# Patient Record
Sex: MALE | Race: WHITE | NOT HISPANIC OR LATINO | Employment: OTHER | URBAN - METROPOLITAN AREA
[De-identification: names, ages, dates, MRNs, and addresses within clinical notes are randomized per-mention and may not be internally consistent; named-entity substitution may affect disease eponyms.]

---

## 2017-02-17 DIAGNOSIS — F43.20 ADULT SITUATIONAL STRESS DISORDER: Chronic | ICD-10-CM

## 2017-02-17 DIAGNOSIS — F41.1 GAD (GENERALIZED ANXIETY DISORDER): ICD-10-CM

## 2017-02-17 RX ORDER — ESCITALOPRAM OXALATE 10 MG/1
TABLET ORAL
Qty: 30 TABLET | Refills: 1 | Status: SHIPPED | OUTPATIENT
Start: 2017-02-17 | End: 2017-04-17 | Stop reason: SDUPTHER

## 2017-03-13 ENCOUNTER — LAB VISIT (OUTPATIENT)
Dept: LAB | Facility: HOSPITAL | Age: 62
End: 2017-03-13
Attending: STUDENT IN AN ORGANIZED HEALTH CARE EDUCATION/TRAINING PROGRAM
Payer: COMMERCIAL

## 2017-03-13 LAB
ANION GAP SERPL CALC-SCNC: 10 MMOL/L
BUN SERPL-MCNC: 18 MG/DL
CALCIUM SERPL-MCNC: 9.7 MG/DL
CHLORIDE SERPL-SCNC: 105 MMOL/L
CO2 SERPL-SCNC: 24 MMOL/L
CREAT SERPL-MCNC: 1.4 MG/DL
EST. GFR  (AFRICAN AMERICAN): >60 ML/MIN/1.73 M^2
EST. GFR  (NON AFRICAN AMERICAN): 53.1 ML/MIN/1.73 M^2
GLUCOSE SERPL-MCNC: 117 MG/DL
POTASSIUM SERPL-SCNC: 5.2 MMOL/L
SODIUM SERPL-SCNC: 139 MMOL/L

## 2017-03-13 PROCEDURE — 36415 COLL VENOUS BLD VENIPUNCTURE: CPT | Mod: PO

## 2017-03-13 PROCEDURE — 83036 HEMOGLOBIN GLYCOSYLATED A1C: CPT

## 2017-03-13 PROCEDURE — 80048 BASIC METABOLIC PNL TOTAL CA: CPT

## 2017-03-14 LAB
ESTIMATED AVG GLUCOSE: 180 MG/DL
HBA1C MFR BLD HPLC: 7.9 %

## 2017-03-20 ENCOUNTER — OFFICE VISIT (OUTPATIENT)
Dept: ENDOCRINOLOGY | Facility: CLINIC | Age: 62
End: 2017-03-20
Payer: COMMERCIAL

## 2017-03-20 VITALS
BODY MASS INDEX: 24.61 KG/M2 | WEIGHT: 171.88 LBS | SYSTOLIC BLOOD PRESSURE: 110 MMHG | HEART RATE: 76 BPM | DIASTOLIC BLOOD PRESSURE: 58 MMHG | HEIGHT: 70 IN

## 2017-03-20 DIAGNOSIS — I15.2 HYPERTENSION ASSOCIATED WITH DIABETES: ICD-10-CM

## 2017-03-20 DIAGNOSIS — N18.30 TYPE 1 DIABETES MELLITUS WITH STAGE 3 CHRONIC KIDNEY DISEASE: ICD-10-CM

## 2017-03-20 DIAGNOSIS — I25.10 CORONARY ARTERY DISEASE DUE TO CALCIFIED CORONARY LESION: ICD-10-CM

## 2017-03-20 DIAGNOSIS — E11.59 HYPERTENSION ASSOCIATED WITH DIABETES: ICD-10-CM

## 2017-03-20 DIAGNOSIS — Z46.81 INSULIN PUMP FITTING OR ADJUSTMENT: ICD-10-CM

## 2017-03-20 DIAGNOSIS — E10.22 TYPE 1 DIABETES MELLITUS WITH STAGE 3 CHRONIC KIDNEY DISEASE: ICD-10-CM

## 2017-03-20 DIAGNOSIS — E10.319 TYPE 1 DIABETES MELLITUS WITH RETINOPATHY, MACULAR EDEMA PRESENCE UNSPECIFIED, UNSPECIFIED LATERALITY, UNSPECIFIED RETINOPATHY SEVERITY: ICD-10-CM

## 2017-03-20 DIAGNOSIS — I25.84 CORONARY ARTERY DISEASE DUE TO CALCIFIED CORONARY LESION: ICD-10-CM

## 2017-03-20 DIAGNOSIS — E16.2 HYPOGLYCEMIA: ICD-10-CM

## 2017-03-20 DIAGNOSIS — E10.69 HYPERLIPIDEMIA DUE TO TYPE 1 DIABETES MELLITUS: ICD-10-CM

## 2017-03-20 DIAGNOSIS — E10.42 TYPE 1 DIABETES MELLITUS WITH DIABETIC POLYNEUROPATHY: Primary | ICD-10-CM

## 2017-03-20 DIAGNOSIS — E78.5 HYPERLIPIDEMIA DUE TO TYPE 1 DIABETES MELLITUS: ICD-10-CM

## 2017-03-20 PROCEDURE — 3074F SYST BP LT 130 MM HG: CPT | Mod: S$GLB,,, | Performed by: NURSE PRACTITIONER

## 2017-03-20 PROCEDURE — 3078F DIAST BP <80 MM HG: CPT | Mod: S$GLB,,, | Performed by: NURSE PRACTITIONER

## 2017-03-20 PROCEDURE — 2022F DILAT RTA XM EVC RTNOPTHY: CPT | Mod: S$GLB,,, | Performed by: NURSE PRACTITIONER

## 2017-03-20 PROCEDURE — 4010F ACE/ARB THERAPY RXD/TAKEN: CPT | Mod: S$GLB,,, | Performed by: NURSE PRACTITIONER

## 2017-03-20 PROCEDURE — 1160F RVW MEDS BY RX/DR IN RCRD: CPT | Mod: S$GLB,,, | Performed by: NURSE PRACTITIONER

## 2017-03-20 PROCEDURE — 99215 OFFICE O/P EST HI 40 MIN: CPT | Mod: S$GLB,,, | Performed by: NURSE PRACTITIONER

## 2017-03-20 PROCEDURE — 3045F PR MOST RECENT HEMOGLOBIN A1C LEVEL 7.0-9.0%: CPT | Mod: S$GLB,,, | Performed by: NURSE PRACTITIONER

## 2017-03-20 PROCEDURE — 99999 PR PBB SHADOW E&M-EST. PATIENT-LVL IV: CPT | Mod: PBBFAC,,, | Performed by: NURSE PRACTITIONER

## 2017-03-20 NOTE — MR AVS SNAPSHOT
[facility]  [address] [facility] [address] [place] [address]  Phone: [phone]  Fax: [phone]                  Narciso Clayt   3/20/2017 3:30 PM   Office Visit    Description:  Male : 1955   Provider:  KLAUDIA Bird,VALENTINE   Department:  [facility] Endocrinology           Reason for Visit     Diabetes Mellitus           Diagnoses this Visit        Comments    Type 1 diabetes mellitus with diabetic polyneuropathy    -  Primary     Type 1 diabetes mellitus with retinopathy, macular edema presence unspecified, unspecified laterality, unspecified retinopathy severity         Type 1 diabetes mellitus with stage 3 chronic kidney disease         Coronary artery disease due to calcified coronary lesion         Hypertension associated with diabetes         Hyperlipidemia due to type 1 diabetes mellitus         Insulin pump status                To Do List           Future Appointments        Provider Department Dept Phone    2017 2:30 PM Jefe Coronado MD [facility] [phone]    2017 8:30 AM Jefe Coronado MD [facility] [phone]    2017 8:15 AM LAB, [place] [facility] [phone]    2017 3:30 PM KLAUDIA Bird,VALENTINE [facility] [phone]      Goals (5 Years of Data)     None      Follow-Up and Disposition     Return in about 3 months (around 2017).      [facility]     [facility] Care [facility] - 24/7 Assistance  Registered nurses in the [facility] provide clinical advisement, health education, appointment booking, and other advisory services.  Call for this free service at [phone].             Medications           Message regarding Medications     Verify the changes and/or additions to your medication regime listed below are the same as discussed with your clinician today.  If any of these changes or additions are incorrect, please notify  "your healthcare provider.             Verify that the below list of medications is an accurate representation of the medications you are currently taking.  If none reported, the list may be blank. If incorrect, please contact your healthcare provider. Carry this list with you in case of emergency.           Current Medications     albuterol 90 mcg/actuation inhaler Inhale 2 puffs into the lungs every 6 (six) hours as needed for Wheezing or Shortness of Breath.    alprazolam (XANAX) 0.5 MG tablet Take 1 tablet (0.5 mg total) by mouth 3 (three) times daily as needed.    ascorbic acid (VITAMIN C) 250 MG tablet Take 250 mg by mouth once daily.      aspirin (ECOTRIN) 81 MG EC tablet Take 81 mg by mouth once daily.    atorvastatin (LIPITOR) 40 MG tablet Take 40 mg by mouth every other day.     BD ULTRA-FINE JUANA PEN NEEDLES 32 x 5/32 " Ndle USE 4/ DAY    blood sugar diagnostic Strp Check blood glucose 6 times a day - before meals and at bedtime.    blood-glucose sensor (DEXCOM G4 SENSOR) Fabby Please change sensor every 7 days    escitalopram oxalate (LEXAPRO) 10 MG tablet TAKE 1 TABLET BY MOUTH DAILY    fluticasone (FLONASE) 50 mcg/actuation nasal spray 2 sprays by Each Nare route once daily.    insulin aspart (NOVOLOG) 100 unit/mL injection For use in insulin pump    insulin glargine (LANTUS) 100 unit/mL injection Inject 30 Units into the skin every evening.    insulin syringe-needle U-100 (BD INSULIN SYRINGE ULT-FINE II) 1/2 mL 31 x 5/16" Syrg Inject once daily    insulin syringe-needle U-100 1/2 mL 31 x 5/16" Syrg 1 each by Misc.(Non-Drug; Combo Route) route 4 (four) times daily.    lancets Misc Check blood glucose 6x/day. e10.65    lisinopril (PRINIVIL,ZESTRIL) 2.5 MG tablet Take 2.5 mg by mouth every 48 hours.     multivitamin capsule Take 1 capsule by mouth once daily.      nitroGLYCERIN (NITROSTAT) 0.3 MG SL tablet Place 0.3 mg under the tongue every 5 (five) minutes as needed.      prasugrel (EFFIENT) 10 mg Tab " "Take 10 mg by mouth once daily.    RANEXA 500 mg Tb12 Take 500 mg by mouth 2 (two) times daily.     (No Medication Selected) Inject into the skin continuous.           Clinical Reference Information           Your Vitals Were     BP Pulse Height Weight BMI    110/58 76 5' 10" (1.778 m) 78 kg (171 lb 13.6 oz) 24.66 kg/m2      Blood Pressure          Most Recent Value    BP  (!)  110/58      Allergies as of 3/20/2017     Neomycin    Codeine    Doxycycline Hyclate    Shellfish Containing Products      Immunizations Administered on Date of Encounter - 3/20/2017     None      Orders Placed During Today's Visit     Future Labs/Procedures Expected by Expires    Basic metabolic panel  3/20/2017 5/19/2018    Hemoglobin A1c  3/20/2017 5/19/2018      Language Assistance Services     ATTENTION: Language assistance services are available, free of charge. Please call [phone].      ATENCIÓN: Si habla dagmar, tiene a ford disposición servicios gratuitos de asistencia lingüística. Llame al [phone].     CHÚ Ý: N?u b?n nói Ti?ng Vi?t, có các d?ch v? h? tr? ngôn ng? mi?n phí dành cho b?n. G?i s? [phone].         [facility] complies with applicable Federal civil rights laws and does not discriminate on the basis of race, color, national origin, age, disability, or sex.        "

## 2017-03-20 NOTE — PROGRESS NOTES
CC: Mr. Narciso Becerra arrives today for management of Type 1  DM and review of chronic medical conditions, as listed in the visit diagnosis section of this encounter.     HPI: Mr. Narciso Becerra was diagnosed with Type 1  DM at age 18 after viral illness. Denies FH of DM. Denies hospitalizations due to DM.     Started Dexcom G4 in 2012.  He started insulin pump therapy with Omnipod in June 2015.    Last seen in endocrine by EULALIO Soto NP in 12/2016.   He is new to me today.    BG readings are checked 6x/day.    Hypoglycemia: Occasionally, may occur when active. Occurred last night after he accidentally overcounted carbs with his late dinner. Also may occur if he eats late lunch, although not every time this happens.   Hypoglycemic Symptoms: dizziness, jitteriness and sweating  Hypoglycemia Treatment: soda    Missing Insulin/PO medication doses: No  Timing prandial insulin 5-15 minutes before meals: yes    Exercise: Not lately. Plans to resume walking 2 miles/day    Dietary Habits: Eats 3 meals/day. Rare snacking. .    Last DM education appointment: 2015    He has CKD. Followed by Dr. Estrada. Sees him every 3-6 months.     Has CAD, is s/p CABG in 2012. Followed by Dr. Cedeño, Cardiology.       CURRENT DIABETIC MEDS: Novolog in Omnipod insulin pump  Glucometer type: Freestyle strips    Name of Device or Devices used by the patient: Omnipod  When did you start the current therapy you are on: 6/2015  Frequency of changing site/infusion set/tubing/cartridges: 3 days  Frequency of changing CGM: 7 days  Using bolus wizard: yes  Taking bolus with each meal: yes    PUMP SETTINGS:  Basal:  0000 - 0.85 u/hr  1100 - 0.5 u/hr    I:C ratio:  0000 - 1:10  0500 - 1:8    ISF:  50    Target:   0000 - 140    Active insulin time: 4 hours      Last Eye Exam: 2/2017. +  He does have a history of retinopathy but has not required treatment for it in years. Sees Dr. Bronson, retina specialist.   Last Podiatry Exam:  "n/a    REVIEW OF SYSTEMS  Constitutional: no c/o fatigue, weakness. + weight loss.   Eyes: denies visual disturbances.  Cardiac: no palpitations or chest pain.  Respiratory: no cough or dyspnea.  GI: no c/o abdominal pain or nausea  Skin: no lesions or rashes. + dog scratches to B forearms   Neuro: no numbness, tingling, or parasthesias.  Endocrine: denies polyphagia, polydipsia, polyuria      Personally reviewed Past Medical, Surgical, Social History.    Vital Signs  BP (!) 110/58  Pulse 76  Ht 5' 10" (1.778 m)  Wt 78 kg (171 lb 13.6 oz)  BMI 24.66 kg/m2    Personally reviewed the below labs:    Hemoglobin A1C   Date Value Ref Range Status   03/13/2017 7.9 (H) 4.5 - 6.2 % Final     Comment:     According to ADA guidelines, hemoglobin A1C <7.0% represents  optimal control in non-pregnant diabetic patients.  Different  metrics may apply to specific populations.   Standards of Medical Care in Diabetes - 2016.  For the purpose of screening for the presence of diabetes:  <5.7%     Consistent with the absence of diabetes  5.7-6.4%  Consistent with increasing risk for diabetes   (prediabetes)  >or=6.5%  Consistent with diabetes  Currently no consensus exists for use of hemoglobin A1C  for diagnosis of diabetes for children.     12/12/2016 7.8 (H) 4.5 - 6.2 % Final     Comment:     According to ADA guidelines, hemoglobin A1C <7.0% represents  optimal control in non-pregnant diabetic patients.  Different  metrics may apply to specific populations.   Standards of Medical Care in Diabetes - 2016.  For the purpose of screening for the presence of diabetes:  <5.7%     Consistent with the absence of diabetes  5.7-6.4%  Consistent with increasing risk for diabetes   (prediabetes)  >or=6.5%  Consistent with diabetes  Currently no consensus exists for use of hemoglobin A1C  for diagnosis of diabetes for children.     10/18/2016 7.6 (H) 4.5 - 6.2 % Final     Comment:     According to ADA guidelines, hemoglobin A1C <7.0% " represents  optimal control in non-pregnant diabetic patients.  Different  metrics may apply to specific populations.   Standards of Medical Care in Diabetes - 2016.  For the purpose of screening for the presence of diabetes:  <5.7%     Consistent with the absence of diabetes  5.7-6.4%  Consistent with increasing risk for diabetes   (prediabetes)  >or=6.5%  Consistent with diabetes  Currently no consensus exists for use of hemoglobin A1C  for diagnosis of diabetes for children.         Chemistry        Component Value Date/Time     03/13/2017 0911    K 5.2 (H) 03/13/2017 0911     03/13/2017 0911    CO2 24 03/13/2017 0911    BUN 18 03/13/2017 0911    CREATININE 1.4 03/13/2017 0911     (H) 03/13/2017 0911        Component Value Date/Time    CALCIUM 9.7 03/13/2017 0911    ALKPHOS 62 11/14/2016 0856    AST 26 11/14/2016 0856    ALT 28 11/14/2016 0856    BILITOT 0.7 11/14/2016 0856          Lab Results   Component Value Date    CHOL 156 10/18/2016    CHOL 164 04/18/2016    CHOL 161 01/08/2016     Lab Results   Component Value Date    HDL 62 10/18/2016    HDL 60 04/18/2016    HDL 60 01/08/2016     Lab Results   Component Value Date    LDLCALC 67.4 10/18/2016    LDLCALC 83.8 04/18/2016    LDLCALC 84.8 01/08/2016     Lab Results   Component Value Date    TRIG 133 10/18/2016    TRIG 101 04/18/2016    TRIG 81 01/08/2016     Lab Results   Component Value Date    CHOLHDL 39.7 10/18/2016    CHOLHDL 36.6 04/18/2016    CHOLHDL 37.3 01/08/2016       Lab Results   Component Value Date    MICALBCREAT 8.4 08/24/2015     Lab Results   Component Value Date    TSH 3.033 01/20/2016       Estimated Creatinine Clearance: 55.8 mL/min (based on Cr of 1.4).    No results found for: [id number]      PHYSICAL EXAMINATION  Constitutional: Appears well, no distress  Neck: Supple, trachea midline; no thyromegaly or nodules.   Respiratory: CTA, even and unlabored.  Cardiovascular: RRR, no murmurs, no carotid bruits. DP pulses  2+  bilaterally; no edema.    Lymph: no cervical or supraclavicular lymphadenopathy  Skin: warm and dry; no lipohypertrophy, or acanthosis nigracans observed.  Neuro: no loss of protective sensation via 10 gm monofilament. Vibratory exam decreased bilaterally, DTR 2+ BUE/2+BLE.  Feet: no open wounds or callouses; appropriate footwear.    PUMP DOWNLOAD: 2 episodes of hypoglycemia mid afternoon when he skipped lunch. One episode with BG 49   2 hours after bolus. BG are variable through the day. Usually with fasting hyperglycemia.  7 day averages ---   Average BG - 175  Average bolus - 54% (18.1 units)  Average basal - 46% (15.7 units)            DEXCOM DOWNLOAD: Period of hyperglycemia in the early AM hours.             Assessment/Plan  1. Type 1 diabetes mellitus with diabetic polyneuropathy     Hypoglycemia    -- A1c elevated. A1c goal 7.5% or less. BG are variable.   -- Dexcom reveals higher BG in early morning. Increase 0300 - 0700 basal rate to 0.95 u/hr  -- recommended decreasing daytime basal slightly because of occasional hypo when eating late meal but he declines. He's rather eaton schedule. Discussed importance of avoiding hypoglycemia.   -- check BG 6x/day    -- Discussed diagnosis of DM, A1c goals, progression of disease, long term complications and tx options.  Advised patient to check BG before activities, such as driving or exercise.  -- Reviewed hypoglycemia management: treat with 1/2 glass of juice, 1/2 can regular coke, or 4 glucose tablets. Monitor and repeat treatment every 15 minutes until BG is >70 Then have a snack, which includes a complex carbohydrate and protein.   2. Type 1 diabetes mellitus with retinopathy, macular edema presence unspecified, unspecified laterality, unspecified retinopathy severity  -- keep follow ups   3. Type 1 diabetes mellitus with stage 3 chronic kidney disease  -- avoid insulin stacking, hypoglycemia   4. Coronary artery disease due to calcified coronary lesion  -- avoid  hypoglycemia    5. Hypertension associated with diabetes  -- controlled, on ace-i   6. Hyperlipidemia due to type 1 diabetes mellitus  -- controlled, on statin   7. Insulin pump fitting or adjustment  -- download reviewed and settings adjusted     Total Time and Counselin minutes, >50% time spent counseling as noted above in #1 A/P.       FOLLOW UP  Return in about 3 months (around 2017).   Patient instructed to bring BG logs to each follow up   Patient encouraged to call for any BG/medication issues, concerns, or questions.    Orders Placed This Encounter   Procedures    Hemoglobin A1c    Basic metabolic panel

## 2017-04-06 ENCOUNTER — TELEPHONE (OUTPATIENT)
Dept: ENDOCRINOLOGY | Facility: CLINIC | Age: 62
End: 2017-04-06

## 2017-04-06 ENCOUNTER — PATIENT MESSAGE (OUTPATIENT)
Dept: ENDOCRINOLOGY | Facility: CLINIC | Age: 62
End: 2017-04-06

## 2017-04-06 DIAGNOSIS — E10.8 TYPE 1 DIABETES MELLITUS WITH COMPLICATION: ICD-10-CM

## 2017-04-06 RX ORDER — INSULIN GLARGINE 100 [IU]/ML
16 INJECTION, SOLUTION SUBCUTANEOUS DAILY
Qty: 10 ML | Refills: 3
Start: 2017-04-06 | End: 2017-10-02

## 2017-04-06 NOTE — TELEPHONE ENCOUNTER
Pump failed last night. He was told my Omnipod rep that his pod was still delivering basal. BG was 98 this AM. He has about 20 units remaining in pod. i advised him to give Lantus 16 units when pod runs out. He is using Novolog for meals. He should be getting new PDM tomorrow. i will send him MOM with his pump settings. He verbalized understanding.

## 2017-04-06 NOTE — TELEPHONE ENCOUNTER
----- Message from Allen Brand sent at 4/6/2017 12:08 PM CDT -----  Pt called stated that he need a sheet for pump Omni Pod Care Plan/BasalProgramsetting and Pump Setting/stated that he can't find the sheet and if you could get him from 3/20/17/pls call if you can at [phone]

## 2017-04-06 NOTE — TELEPHONE ENCOUNTER
Spoke with pt, states omnipod went dead, requested an omnipod plan sheet with pump setting, pt requested a call from you to clarify the settings for the pump

## 2017-04-13 ENCOUNTER — TELEPHONE (OUTPATIENT)
Dept: ENDOCRINOLOGY | Facility: CLINIC | Age: 62
End: 2017-04-13

## 2017-04-13 NOTE — TELEPHONE ENCOUNTER
----- Message from Carolemiya Francis sent at 4/13/2017  2:19 PM CDT -----  Patient states that [employer] is faxing paperwork and need you to complete and fax back.  Any questions call patient at [phone].

## 2017-04-13 NOTE — TELEPHONE ENCOUNTER
Spoke with pt, instructed pt we received paperwork and awaiting Trudy to sign it voiced understanding

## 2017-04-17 ENCOUNTER — TELEPHONE (OUTPATIENT)
Dept: ENDOCRINOLOGY | Facility: CLINIC | Age: 62
End: 2017-04-17

## 2017-04-17 DIAGNOSIS — F41.1 GAD (GENERALIZED ANXIETY DISORDER): ICD-10-CM

## 2017-04-17 DIAGNOSIS — F43.20 ADULT SITUATIONAL STRESS DISORDER: Chronic | ICD-10-CM

## 2017-04-17 RX ORDER — ESCITALOPRAM OXALATE 10 MG/1
TABLET ORAL
Qty: 30 TABLET | Refills: 4 | Status: SHIPPED | OUTPATIENT
Start: 2017-04-17 | End: 2017-09-12 | Stop reason: SDUPTHER

## 2017-04-17 NOTE — TELEPHONE ENCOUNTER
----- Message from Shanda Quijano sent at 4/17/2017  3:46 PM CDT -----  Contact: Paola from [employer]  Checking on status of return paperwork of clinical notes and physician's orders for diabetic supplies and CGM. Please fax to [phone]. Thanks!

## 2017-04-24 ENCOUNTER — LAB VISIT (OUTPATIENT)
Dept: LAB | Facility: HOSPITAL | Age: 62
End: 2017-04-24
Attending: STUDENT IN AN ORGANIZED HEALTH CARE EDUCATION/TRAINING PROGRAM
Payer: COMMERCIAL

## 2017-04-24 DIAGNOSIS — R19.7 DIARRHEA: Primary | ICD-10-CM

## 2017-04-24 PROCEDURE — 87449 NOS EACH ORGANISM AG IA: CPT

## 2017-04-24 PROCEDURE — 87209 SMEAR COMPLEX STAIN: CPT

## 2017-04-24 PROCEDURE — 87046 STOOL CULTR AEROBIC BACT EA: CPT

## 2017-04-24 PROCEDURE — 87045 FECES CULTURE AEROBIC BACT: CPT

## 2017-04-24 PROCEDURE — 87427 SHIGA-LIKE TOXIN AG IA: CPT

## 2017-04-25 LAB
C DIFF GDH STL QL: NEGATIVE
C DIFF TOX A+B STL QL IA: NEGATIVE
O+P STL TRI STN: NORMAL

## 2017-04-26 LAB
E COLI SXT1 STL QL IA: NEGATIVE
E COLI SXT2 STL QL IA: NEGATIVE

## 2017-04-27 LAB — BACTERIA STL CULT: NORMAL

## 2017-05-15 ENCOUNTER — LAB VISIT (OUTPATIENT)
Dept: LAB | Facility: HOSPITAL | Age: 62
End: 2017-05-15
Attending: FAMILY MEDICINE
Payer: COMMERCIAL

## 2017-05-15 DIAGNOSIS — R80.9 PROTEINURIA: ICD-10-CM

## 2017-05-15 DIAGNOSIS — I12.9 HYPERTENSIVE RENAL DISEASE: ICD-10-CM

## 2017-05-15 DIAGNOSIS — N40.0 BENIGN LOCALIZED HYPERPLASIA OF PROSTATE: ICD-10-CM

## 2017-05-15 DIAGNOSIS — E11.9 DIABETES MELLITUS TYPE 2 IN NONOBESE: ICD-10-CM

## 2017-05-15 DIAGNOSIS — D63.8 ANEMIA OF CHRONIC DISORDER: Primary | ICD-10-CM

## 2017-05-15 DIAGNOSIS — N18.2 CHRONIC KIDNEY DISEASE, STAGE 2 (MILD): ICD-10-CM

## 2017-05-15 LAB
ALBUMIN SERPL BCP-MCNC: 3.6 G/DL
ALP SERPL-CCNC: 59 U/L
ALT SERPL W/O P-5'-P-CCNC: 23 U/L
ANION GAP SERPL CALC-SCNC: 8 MMOL/L
AST SERPL-CCNC: 24 U/L
BASOPHILS # BLD AUTO: 0.03 K/UL
BASOPHILS NFR BLD: 0.5 %
BILIRUB SERPL-MCNC: 0.5 MG/DL
BUN SERPL-MCNC: 14 MG/DL
CALCIUM SERPL-MCNC: 8.5 MG/DL
CHLORIDE SERPL-SCNC: 101 MMOL/L
CO2 SERPL-SCNC: 22 MMOL/L
CREAT SERPL-MCNC: 1.3 MG/DL
CREAT UR-MCNC: 34 MG/DL
DIFFERENTIAL METHOD: ABNORMAL
EOSINOPHIL # BLD AUTO: 0.2 K/UL
EOSINOPHIL NFR BLD: 3.7 %
ERYTHROCYTE [DISTWIDTH] IN BLOOD BY AUTOMATED COUNT: 12.8 %
EST. GFR  (AFRICAN AMERICAN): >60 ML/MIN/1.73 M^2
EST. GFR  (NON AFRICAN AMERICAN): 58.1 ML/MIN/1.73 M^2
GLUCOSE SERPL-MCNC: 276 MG/DL
HCT VFR BLD AUTO: 32.8 %
HGB BLD-MCNC: 11.2 G/DL
LYMPHOCYTES # BLD AUTO: 1.6 K/UL
LYMPHOCYTES NFR BLD: 26.1 %
MCH RBC QN AUTO: 32.2 PG
MCHC RBC AUTO-ENTMCNC: 34.1 %
MCV RBC AUTO: 94 FL
MONOCYTES # BLD AUTO: 0.6 K/UL
MONOCYTES NFR BLD: 9.2 %
NEUTROPHILS # BLD AUTO: 3.8 K/UL
NEUTROPHILS NFR BLD: 60.2 %
PHOSPHATE SERPL-MCNC: 3.2 MG/DL
PLATELET # BLD AUTO: 226 K/UL
PMV BLD AUTO: 10.5 FL
POTASSIUM SERPL-SCNC: 5 MMOL/L
PROT SERPL-MCNC: 6.5 G/DL
PROT UR-MCNC: <7 MG/DL
PROT/CREAT RATIO, UR: NORMAL
PTH-INTACT SERPL-MCNC: 76 PG/ML
RBC # BLD AUTO: 3.48 M/UL
SODIUM SERPL-SCNC: 131 MMOL/L
WBC # BLD AUTO: 6.28 K/UL

## 2017-05-15 PROCEDURE — 84100 ASSAY OF PHOSPHORUS: CPT

## 2017-05-15 PROCEDURE — 80053 COMPREHEN METABOLIC PANEL: CPT

## 2017-05-15 PROCEDURE — 82570 ASSAY OF URINE CREATININE: CPT

## 2017-05-15 PROCEDURE — 36415 COLL VENOUS BLD VENIPUNCTURE: CPT | Mod: PO

## 2017-05-15 PROCEDURE — 85025 COMPLETE CBC W/AUTO DIFF WBC: CPT

## 2017-05-15 PROCEDURE — 83970 ASSAY OF PARATHORMONE: CPT

## 2017-05-17 RX ORDER — INSULIN ASPART 100 [IU]/ML
INJECTION, SOLUTION INTRAVENOUS; SUBCUTANEOUS
Qty: 50 ML | Refills: 3 | Status: SHIPPED | OUTPATIENT
Start: 2017-05-17 | End: 2017-11-27 | Stop reason: SDUPTHER

## 2017-06-02 DIAGNOSIS — E10.8 TYPE 1 DIABETES MELLITUS WITH COMPLICATION: Primary | ICD-10-CM

## 2017-06-08 ENCOUNTER — DOCUMENTATION ONLY (OUTPATIENT)
Dept: FAMILY MEDICINE | Facility: CLINIC | Age: 62
End: 2017-06-08

## 2017-06-08 NOTE — PROGRESS NOTES
Pre-Visit Chart Review  For Appointment Scheduled on 06/09/2017    Health Maintenance Due   Topic Date Due    TETANUS VACCINE  07/15/1971    Colonoscopy  07/15/2003    Zoster Vaccine  07/15/2013    Hemoglobin A1c  06/13/2017

## 2017-06-09 ENCOUNTER — OFFICE VISIT (OUTPATIENT)
Dept: FAMILY MEDICINE | Facility: CLINIC | Age: 62
End: 2017-06-09
Payer: COMMERCIAL

## 2017-06-09 VITALS
BODY MASS INDEX: 24.9 KG/M2 | HEIGHT: 70 IN | DIASTOLIC BLOOD PRESSURE: 57 MMHG | SYSTOLIC BLOOD PRESSURE: 111 MMHG | TEMPERATURE: 99 F | WEIGHT: 173.94 LBS | HEART RATE: 84 BPM

## 2017-06-09 DIAGNOSIS — Z12.5 PROSTATE CANCER SCREENING: ICD-10-CM

## 2017-06-09 DIAGNOSIS — G90.3 NEUROGENIC ORTHOSTATIC HYPOTENSION: Primary | ICD-10-CM

## 2017-06-09 PROCEDURE — 90471 IMMUNIZATION ADMIN: CPT | Mod: S$GLB,,, | Performed by: FAMILY MEDICINE

## 2017-06-09 PROCEDURE — 90670 PCV13 VACCINE IM: CPT | Mod: S$GLB,,, | Performed by: FAMILY MEDICINE

## 2017-06-09 PROCEDURE — 99214 OFFICE O/P EST MOD 30 MIN: CPT | Mod: 25,S$GLB,, | Performed by: FAMILY MEDICINE

## 2017-06-09 PROCEDURE — 99999 PR PBB SHADOW E&M-EST. PATIENT-LVL III: CPT | Mod: PBBFAC,,, | Performed by: FAMILY MEDICINE

## 2017-06-09 RX ORDER — FOLIC ACID 1 MG/1
1 TABLET ORAL DAILY
Qty: 30 TABLET | Refills: 3 | Status: SHIPPED | OUTPATIENT
Start: 2017-06-09 | End: 2017-10-11 | Stop reason: SDUPTHER

## 2017-06-09 NOTE — PROGRESS NOTES
"Vertigo - Dizziness: Patient presents with dizziness .  The dizziness has been present for several months. The patient describes the symptoms as disequalibirum. Symptoms are exacerbated by rising from squatting or sitting position The patient also complains of none. Patient denies aural pressure  otalgia  otorrhea  tinnitus.  He has been treated with nothing with fair improvement.  Previous work up has been entw/Dr Kern  Patient Active Problem List   Diagnosis    CKD (chronic kidney disease) stage 3, GFR 30-59 ml/min    Coronary artery disease    Type 1 diabetes mellitus with diabetic polyneuropathy    Peripheral neuropathy    Insulin pump fitting or adjustment    Hypertension associated with diabetes    Hyperlipidemia due to type 1 diabetes mellitus    Angina of effort    Encounter for long-term (current) use of insulin    Adult situational stress disorder    Type 1 diabetes mellitus with retinopathy    Type 1 diabetes mellitus with stage 3 chronic kidney disease   Endocrine ROS: positive for - malaise/lethargy  negative for - hair pattern changes, hot flashes, mood swings, palpitations, polydipsia/polyuria, temperature intolerance or unexpected weight changes  BP (!) 111/57 (BP Location: Right arm, Patient Position: Sitting, BP Method: Automatic)   Pulse 84   Temp 98.6 °F (37 °C) (Oral)   Ht 5' 10" (1.778 m)   Wt 78.9 kg (173 lb 15.1 oz)   BMI 24.96 kg/m²     ENT exam reveals - ENT exam normal, no neck nodes or sinus tenderness, bilateral TM normal without fluid or infection, neck without nodes and post nasal drip noted.  Lab Results   Component Value Date    HGBA1C 7.9 (H) 03/13/2017       Neurogenic orthostatic hypotension  -     folic acid (FOLVITE) 1 MG tablet; Take 1 tablet (1 mg total) by mouth once daily.  Dispense: 30 tablet; Refill: 3  -     CORTISOL, 8AM; Future  -     Cardiology Lab Carotid US Bilateral; Future  -     TSH; Future  -     US Carotid Bilateral; Future    Prostate cancer " screening  -     PSA, Screening; Future    Other orders  -     (In Office Administered) Pneumococcal Conjugate Vaccine (13 Valent) (IM)      Patient readiness: acceptance and barriers:readiness    During the course of the visit the patient was educated and counseled about the following:     Diabetes:  Discussed general issues about diabetes pathophysiology and management.  Suggested low cholesterol diet.  Encouraged aerobic exercise.  Discussed foot care.  Hypertension:   Dietary sodium restriction.  Check blood pressures daily and record.    Goals: Diabetes: Maintain Hemoglobin A1C below 7 and Hypertension: Reduce Blood Pressure    Did patient meet goals/outcomes: Yes    The following self management tools provided: blood pressure log  blood glucose log  excercise log    Patient Instructions (the written plan) was given to the patient/family.     Time spent with patient: 30 minutes

## 2017-06-15 ENCOUNTER — LAB VISIT (OUTPATIENT)
Dept: LAB | Facility: HOSPITAL | Age: 62
End: 2017-06-15
Attending: FAMILY MEDICINE
Payer: COMMERCIAL

## 2017-06-15 DIAGNOSIS — G90.3 NEUROGENIC ORTHOSTATIC HYPOTENSION: ICD-10-CM

## 2017-06-15 DIAGNOSIS — E10.42 TYPE 1 DIABETES MELLITUS WITH DIABETIC POLYNEUROPATHY: ICD-10-CM

## 2017-06-15 DIAGNOSIS — Z12.5 PROSTATE CANCER SCREENING: ICD-10-CM

## 2017-06-15 LAB
ANION GAP SERPL CALC-SCNC: 8 MMOL/L
BUN SERPL-MCNC: 13 MG/DL
CALCIUM SERPL-MCNC: 9.3 MG/DL
CHLORIDE SERPL-SCNC: 106 MMOL/L
CO2 SERPL-SCNC: 27 MMOL/L
COMPLEXED PSA SERPL-MCNC: 0.46 NG/ML
CORTIS SERPL-MCNC: 14.3 UG/DL
CREAT SERPL-MCNC: 1.4 MG/DL
EST. GFR  (AFRICAN AMERICAN): >60 ML/MIN/1.73 M^2
EST. GFR  (NON AFRICAN AMERICAN): 53.1 ML/MIN/1.73 M^2
GLUCOSE SERPL-MCNC: 118 MG/DL
POTASSIUM SERPL-SCNC: 4.5 MMOL/L
SODIUM SERPL-SCNC: 141 MMOL/L
TSH SERPL DL<=0.005 MIU/L-ACNC: 3.22 UIU/ML

## 2017-06-15 PROCEDURE — 80048 BASIC METABOLIC PNL TOTAL CA: CPT

## 2017-06-15 PROCEDURE — 83036 HEMOGLOBIN GLYCOSYLATED A1C: CPT

## 2017-06-15 PROCEDURE — 82533 TOTAL CORTISOL: CPT

## 2017-06-15 PROCEDURE — 84153 ASSAY OF PSA TOTAL: CPT

## 2017-06-15 PROCEDURE — 84443 ASSAY THYROID STIM HORMONE: CPT

## 2017-06-15 PROCEDURE — 36415 COLL VENOUS BLD VENIPUNCTURE: CPT | Mod: PO

## 2017-06-16 LAB
ESTIMATED AVG GLUCOSE: 174 MG/DL
HBA1C MFR BLD HPLC: 7.7 %

## 2017-06-22 ENCOUNTER — PATIENT MESSAGE (OUTPATIENT)
Dept: ENDOCRINOLOGY | Facility: CLINIC | Age: 62
End: 2017-06-22

## 2017-06-26 ENCOUNTER — OFFICE VISIT (OUTPATIENT)
Dept: ENDOCRINOLOGY | Facility: CLINIC | Age: 62
End: 2017-06-26
Payer: COMMERCIAL

## 2017-06-26 VITALS
BODY MASS INDEX: 24.89 KG/M2 | WEIGHT: 173.5 LBS | HEART RATE: 96 BPM | SYSTOLIC BLOOD PRESSURE: 124 MMHG | DIASTOLIC BLOOD PRESSURE: 56 MMHG

## 2017-06-26 DIAGNOSIS — E10.69 HYPERLIPIDEMIA DUE TO TYPE 1 DIABETES MELLITUS: ICD-10-CM

## 2017-06-26 DIAGNOSIS — I15.2 HYPERTENSION ASSOCIATED WITH DIABETES: ICD-10-CM

## 2017-06-26 DIAGNOSIS — E11.59 HYPERTENSION ASSOCIATED WITH DIABETES: ICD-10-CM

## 2017-06-26 DIAGNOSIS — N18.30 TYPE 1 DIABETES MELLITUS WITH STAGE 3 CHRONIC KIDNEY DISEASE: ICD-10-CM

## 2017-06-26 DIAGNOSIS — Z96.41 INSULIN PUMP STATUS: ICD-10-CM

## 2017-06-26 DIAGNOSIS — I25.10 CORONARY ARTERY DISEASE DUE TO CALCIFIED CORONARY LESION: ICD-10-CM

## 2017-06-26 DIAGNOSIS — E78.5 HYPERLIPIDEMIA DUE TO TYPE 1 DIABETES MELLITUS: ICD-10-CM

## 2017-06-26 DIAGNOSIS — E10.22 TYPE 1 DIABETES MELLITUS WITH STAGE 3 CHRONIC KIDNEY DISEASE: ICD-10-CM

## 2017-06-26 DIAGNOSIS — I25.84 CORONARY ARTERY DISEASE DUE TO CALCIFIED CORONARY LESION: ICD-10-CM

## 2017-06-26 DIAGNOSIS — E10.42 TYPE 1 DIABETES MELLITUS WITH DIABETIC POLYNEUROPATHY: Primary | ICD-10-CM

## 2017-06-26 DIAGNOSIS — E10.319 TYPE 1 DIABETES MELLITUS WITH RETINOPATHY, MACULAR EDEMA PRESENCE UNSPECIFIED, UNSPECIFIED LATERALITY, UNSPECIFIED RETINOPATHY SEVERITY: ICD-10-CM

## 2017-06-26 PROCEDURE — 3045F PR MOST RECENT HEMOGLOBIN A1C LEVEL 7.0-9.0%: CPT | Mod: S$GLB,,, | Performed by: NURSE PRACTITIONER

## 2017-06-26 PROCEDURE — 99999 PR PBB SHADOW E&M-EST. PATIENT-LVL IV: CPT | Mod: PBBFAC,,, | Performed by: NURSE PRACTITIONER

## 2017-06-26 PROCEDURE — 99215 OFFICE O/P EST HI 40 MIN: CPT | Mod: S$GLB,,, | Performed by: NURSE PRACTITIONER

## 2017-06-26 NOTE — PROGRESS NOTES
CC: Mr. Narciso Becerra arrives today for management of Type 1  DM and review of chronic medical conditions, as listed in the visit diagnosis section of this encounter.     HPI: Mr. Narciso Becerra was diagnosed with Type 1  DM at age 18 after viral illness. Denies FH of DM. Denies hospitalizations due to DM.     Started Dexcom G4 in 2012.  He started insulin pump therapy with Omnipod in June 2015.    At last visit, 0300 - 0700 basal rate was increased.     BG readings are checked 6x/day.    Hypoglycemia: Occasionally, may occur when active.  Hypoglycemic Symptoms: dizziness, jitteriness and sweating  Hypoglycemia Treatment: soda    Exercise: No formal but very active.     Dietary Habits: Eats 3 meals/day. Dinner is usually very late. He admits to snacking on candy, Coke, and ice cream when BG reaches 90 in the evening, out of fear of hypoglycemia. He then rebounds before he eats his dinner.     Last DM education appointment: 2015    He has CKD. Followed by Dr. Estrada. Sees him every 3-6 months.     Has CAD, is s/p CABG in 2012. Followed by Dr. Cedeño.     He travels often for work, which affects his meal pattern.        CURRENT DIABETIC MEDS: Novolog in Omnipod insulin pump  Glucometer type: Freestyle strips    Name of Device or Devices used by the patient: Omnipod  When did you start the current therapy you are on: 6/2015  Frequency of changing site/infusion set/tubing/cartridges: 3 days  Frequency of changing CGM: 7 days  Using bolus wizard: yes  Taking bolus with each meal: yes    PUMP SETTINGS:  Basal:  0000 - 0.85 u/hr  0300 - 0.95 u/hr  0700 - 0.85 u/hr  1100 - 0.5 u/hr    I:C ratio:  0000 - 1:10  0500 - 1:8    ISF:  50    Target:   0000 - 100-140    Active insulin time: 4 hours      Last Eye Exam: 2/2017. + DR. Atif Weeks and Dr. Bronson, retina specialist.   Last Podiatry Exam: n/a    REVIEW OF SYSTEMS  Constitutional: no c/o fatigue, weakness, weight loss.   Eyes: denies visual  disturbances.  Cardiac: no palpitations or chest pain.  Respiratory: no cough or dyspnea.  GI: no c/o abdominal pain or nausea  Skin: no lesions or rashes.  Neuro: no numbness, tingling, or parasthesias.  Endocrine: denies polyphagia, polydipsia, polyuria      Personally reviewed Past Medical, Surgical, Social History.    Vital Signs  BP (!) 124/56   Pulse 96   Wt 78.7 kg (173 lb 8 oz)   BMI 24.89 kg/m²     Personally reviewed the below labs:    Hemoglobin A1C   Date Value Ref Range Status   06/15/2017 7.7 (H) 4.0 - 5.6 % Final     Comment:     According to ADA guidelines, hemoglobin A1c <7.0% represents  optimal control in non-pregnant diabetic patients. Different  metrics may apply to specific patient populations.   Standards of Medical Care in Diabetes-2016.  For the purpose of screening for the presence of diabetes:  <5.7%     Consistent with the absence of diabetes  5.7-6.4%  Consistent with increasing risk for diabetes   (prediabetes)  >or=6.5%  Consistent with diabetes  Currently, no consensus exists for use of hemoglobin A1c  for diagnosis of diabetes for children.  This Hemoglobin A1c assay has significant interference with fetal   hemoglobin   (HbF). The results are invalid for patients with abnormal amounts of   HbF,   including those with known Hereditary Persistence   of Fetal Hemoglobin. Heterozygous hemoglobin variants (HbAS, HbAC,   HbAD, HbAE, HbA2) do not significantly interfere with this assay;   however, presence of multiple variants in a sample may impact the %   interference.     03/13/2017 7.9 (H) 4.5 - 6.2 % Final     Comment:     According to ADA guidelines, hemoglobin A1C <7.0% represents  optimal control in non-pregnant diabetic patients.  Different  metrics may apply to specific populations.   Standards of Medical Care in Diabetes - 2016.  For the purpose of screening for the presence of diabetes:  <5.7%     Consistent with the absence of diabetes  5.7-6.4%  Consistent with increasing  risk for diabetes   (prediabetes)  >or=6.5%  Consistent with diabetes  Currently no consensus exists for use of hemoglobin A1C  for diagnosis of diabetes for children.     12/12/2016 7.8 (H) 4.5 - 6.2 % Final     Comment:     According to ADA guidelines, hemoglobin A1C <7.0% represents  optimal control in non-pregnant diabetic patients.  Different  metrics may apply to specific populations.   Standards of Medical Care in Diabetes - 2016.  For the purpose of screening for the presence of diabetes:  <5.7%     Consistent with the absence of diabetes  5.7-6.4%  Consistent with increasing risk for diabetes   (prediabetes)  >or=6.5%  Consistent with diabetes  Currently no consensus exists for use of hemoglobin A1C  for diagnosis of diabetes for children.         Chemistry        Component Value Date/Time     06/15/2017 0837    K 4.5 06/15/2017 0837     06/15/2017 0837    CO2 27 06/15/2017 0837    BUN 13 06/15/2017 0837    CREATININE 1.4 06/15/2017 0837     (H) 06/15/2017 0837        Component Value Date/Time    CALCIUM 9.3 06/15/2017 0837    ALKPHOS 59 05/15/2017 0840    AST 24 05/15/2017 0840    ALT 23 05/15/2017 0840    BILITOT 0.5 05/15/2017 0840          Lab Results   Component Value Date    CHOL 156 10/18/2016    CHOL 164 04/18/2016    CHOL 161 01/08/2016     Lab Results   Component Value Date    HDL 62 10/18/2016    HDL 60 04/18/2016    HDL 60 01/08/2016     Lab Results   Component Value Date    LDLCALC 67.4 10/18/2016    LDLCALC 83.8 04/18/2016    LDLCALC 84.8 01/08/2016     Lab Results   Component Value Date    TRIG 133 10/18/2016    TRIG 101 04/18/2016    TRIG 81 01/08/2016     Lab Results   Component Value Date    CHOLHDL 39.7 10/18/2016    CHOLHDL 36.6 04/18/2016    CHOLHDL 37.3 01/08/2016       Lab Results   Component Value Date    MICALBCREAT 8.4 08/24/2015     Lab Results   Component Value Date    TSH 3.219 06/15/2017       CrCl cannot be calculated (Unknown ideal weight.).    No results  found for: [id number]      PHYSICAL EXAMINATION  Constitutional: Appears well, no distress  Neck: Supple, trachea midline; no thyromegaly or nodules.   Respiratory: CTA, even and unlabored.  Cardiovascular: RRR, no murmurs, no carotid bruits. DP pulses  2+ bilaterally; no edema.    Lymph: no cervical or supraclavicular lymphadenopathy  Skin: warm and dry; no lipohypertrophy, or acanthosis nigracans observed.  Neuro: DTR 1+ BUE/1+BLE. Previously, no loss of protective sensation via 10 gm monofilament. Vibratory exam decreased bilaterally  Feet: appropriate footwear.    PUMP DOWNLOAD: see media file for details. Many BG in normal range. Very rare hypoglycemia. Goes long periods without eating in the afternoon/evening, leading to hyperglycemia at midnight (after admittingly eating candy before dinner). Uses bolus wizard.   7 day averages ---   Average TDD - 29.9 u  Average bolus - 14.2 units  Average basal - 15.7 units        DEXCOM DOWNLOAD: Variable BG. Often having prandial excursions in the evening and after midnight.           Assessment/Plan  1. Type 1 diabetes mellitus with diabetic polyneuropathy     Hypoglycemia    -- A1c improving. A1c goal 7.5% or less. BG are variable with many readings in normal range. Needs to stop eating candy, drinking Coke when BG is normal in the evening (90s). This is causing prandial excursions leading into dinner. Advised him to eat dinner earlier. If he has HS snack, take bolus with it.   -- no change to pump settings  -- check BG 6x/day  -- Temp basal feature turned on. Patient instructed on use. Advised him to decrease current basal rate by 20% the night before his upcoming colonoscopy.     -- Discussed diagnosis of DM, A1c goals, progression of disease, long term complications and tx options.  Advised patient to check BG before activities, such as driving or exercise.  -- Reviewed hypoglycemia management: treat with 1/2 glass of juice, 1/2 can regular coke, or 4 glucose  tablets. Monitor and repeat treatment every 15 minutes until BG is >70 Then have a snack, which includes a complex carbohydrate and protein.   2. Type 1 diabetes mellitus with retinopathy, macular edema presence unspecified, unspecified laterality, unspecified retinopathy severity  -- keep follow ups   3. Type 1 diabetes mellitus with stage 3 chronic kidney disease  -- avoid insulin stacking, hypoglycemia  -- follows with nephrology   4. Coronary artery disease due to calcified coronary lesion  -- avoid hypoglycemia    5. Hypertension associated with diabetes  -- controlled, on ace-i   6. Hyperlipidemia due to type 1 diabetes mellitus  -- controlled, on statin   7. Insulin pump status -- download reviewed     Total Time and Counselin minutes, >50% time spent counseling as noted above in #1 A/P.       FOLLOW UP  Return in about 3 months (around 2017).   Patient instructed to bring BG logs to each follow up   Patient encouraged to call for any BG/medication issues, concerns, or questions.    Orders Placed This Encounter   Procedures    Comprehensive metabolic panel    Lipid panel    Hemoglobin A1c

## 2017-07-06 ENCOUNTER — TELEPHONE (OUTPATIENT)
Dept: FAMILY MEDICINE | Facility: CLINIC | Age: 62
End: 2017-07-06

## 2017-07-06 NOTE — TELEPHONE ENCOUNTER
Notified patient of abnormal carotid US.  Continue Lipitor, asa 81 mg daily.  Repeat carotid US in one year.  Dizziness has improved.  He saw ENT and was told ears are fine. He sees Cardiology later this month and he will RTC if dizziness continues after his Cardiology appt.

## 2017-07-06 NOTE — TELEPHONE ENCOUNTER
----- Message from Shanda Quijano sent at 7/5/2017  4:25 PM CDT -----  Contact: Narciso Barrera is calling regarding lab and ultrasound result as would like to be explained the result as does not quite understand. Please call [phone]. Thanks!

## 2017-07-19 ENCOUNTER — TELEPHONE (OUTPATIENT)
Dept: ENDOCRINOLOGY | Facility: CLINIC | Age: 62
End: 2017-07-19

## 2017-07-19 NOTE — TELEPHONE ENCOUNTER
----- Message from Anaya Paniagua sent at 7/19/2017  1:15 PM CDT -----  Had faxed request for pump supplies / yesterday / asking to [facility] / [facility]   [phone] ext [phone]

## 2017-08-24 DIAGNOSIS — Z12.11 COLON CANCER SCREENING: ICD-10-CM

## 2017-09-12 DIAGNOSIS — F43.20 ADULT SITUATIONAL STRESS DISORDER: Chronic | ICD-10-CM

## 2017-09-12 DIAGNOSIS — F41.1 GAD (GENERALIZED ANXIETY DISORDER): ICD-10-CM

## 2017-09-12 RX ORDER — ESCITALOPRAM OXALATE 10 MG/1
TABLET ORAL
Qty: 30 TABLET | Refills: 4 | Status: SHIPPED | OUTPATIENT
Start: 2017-09-12 | End: 2018-01-26 | Stop reason: SDUPTHER

## 2017-09-25 ENCOUNTER — PATIENT MESSAGE (OUTPATIENT)
Dept: ENDOCRINOLOGY | Facility: CLINIC | Age: 62
End: 2017-09-25

## 2017-09-25 ENCOUNTER — LAB VISIT (OUTPATIENT)
Dept: LAB | Facility: HOSPITAL | Age: 62
End: 2017-09-25
Attending: INTERNAL MEDICINE
Payer: COMMERCIAL

## 2017-09-25 DIAGNOSIS — E10.42 TYPE 1 DIABETES MELLITUS WITH DIABETIC POLYNEUROPATHY: ICD-10-CM

## 2017-09-25 LAB
ALBUMIN SERPL BCP-MCNC: 3.8 G/DL
ALP SERPL-CCNC: 69 U/L
ALT SERPL W/O P-5'-P-CCNC: 23 U/L
ANION GAP SERPL CALC-SCNC: 6 MMOL/L
AST SERPL-CCNC: 22 U/L
BILIRUB SERPL-MCNC: 0.5 MG/DL
BUN SERPL-MCNC: 16 MG/DL
CALCIUM SERPL-MCNC: 9.5 MG/DL
CHLORIDE SERPL-SCNC: 103 MMOL/L
CHOLEST SERPL-MCNC: 169 MG/DL
CHOLEST/HDLC SERPL: 2.6 {RATIO}
CO2 SERPL-SCNC: 31 MMOL/L
CREAT SERPL-MCNC: 1.6 MG/DL
EST. GFR  (AFRICAN AMERICAN): 51.9 ML/MIN/1.73 M^2
EST. GFR  (NON AFRICAN AMERICAN): 44.9 ML/MIN/1.73 M^2
ESTIMATED AVG GLUCOSE: 189 MG/DL
GLUCOSE SERPL-MCNC: 118 MG/DL
HBA1C MFR BLD HPLC: 8.2 %
HDLC SERPL-MCNC: 65 MG/DL
HDLC SERPL: 38.5 %
LDLC SERPL CALC-MCNC: 84.4 MG/DL
NONHDLC SERPL-MCNC: 104 MG/DL
POTASSIUM SERPL-SCNC: 4.6 MMOL/L
PROT SERPL-MCNC: 7.2 G/DL
SODIUM SERPL-SCNC: 140 MMOL/L
TRIGL SERPL-MCNC: 98 MG/DL

## 2017-09-25 PROCEDURE — 80053 COMPREHEN METABOLIC PANEL: CPT

## 2017-09-25 PROCEDURE — 36415 COLL VENOUS BLD VENIPUNCTURE: CPT | Mod: PO

## 2017-09-25 PROCEDURE — 80061 LIPID PANEL: CPT

## 2017-09-25 PROCEDURE — 83036 HEMOGLOBIN GLYCOSYLATED A1C: CPT

## 2017-10-02 ENCOUNTER — OFFICE VISIT (OUTPATIENT)
Dept: ENDOCRINOLOGY | Facility: CLINIC | Age: 62
End: 2017-10-02
Payer: COMMERCIAL

## 2017-10-02 VITALS
DIASTOLIC BLOOD PRESSURE: 66 MMHG | SYSTOLIC BLOOD PRESSURE: 128 MMHG | BODY MASS INDEX: 25.19 KG/M2 | HEART RATE: 80 BPM | WEIGHT: 175.94 LBS | HEIGHT: 70 IN

## 2017-10-02 DIAGNOSIS — I25.84 CORONARY ARTERY DISEASE DUE TO CALCIFIED CORONARY LESION: ICD-10-CM

## 2017-10-02 DIAGNOSIS — N18.30 TYPE 1 DIABETES MELLITUS WITH STAGE 3 CHRONIC KIDNEY DISEASE: ICD-10-CM

## 2017-10-02 DIAGNOSIS — E10.8 TYPE 1 DIABETES MELLITUS WITH COMPLICATION: ICD-10-CM

## 2017-10-02 DIAGNOSIS — E11.59 HYPERTENSION ASSOCIATED WITH DIABETES: ICD-10-CM

## 2017-10-02 DIAGNOSIS — E10.69 HYPERLIPIDEMIA DUE TO TYPE 1 DIABETES MELLITUS: ICD-10-CM

## 2017-10-02 DIAGNOSIS — E78.5 HYPERLIPIDEMIA DUE TO TYPE 1 DIABETES MELLITUS: ICD-10-CM

## 2017-10-02 DIAGNOSIS — I25.10 CORONARY ARTERY DISEASE DUE TO CALCIFIED CORONARY LESION: ICD-10-CM

## 2017-10-02 DIAGNOSIS — E10.42 TYPE 1 DIABETES MELLITUS WITH DIABETIC POLYNEUROPATHY: Primary | ICD-10-CM

## 2017-10-02 DIAGNOSIS — E10.319 TYPE 1 DIABETES MELLITUS WITH RETINOPATHY, MACULAR EDEMA PRESENCE UNSPECIFIED, UNSPECIFIED LATERALITY, UNSPECIFIED RETINOPATHY SEVERITY: ICD-10-CM

## 2017-10-02 DIAGNOSIS — I15.2 HYPERTENSION ASSOCIATED WITH DIABETES: ICD-10-CM

## 2017-10-02 DIAGNOSIS — Z46.81 INSULIN PUMP FITTING OR ADJUSTMENT: ICD-10-CM

## 2017-10-02 DIAGNOSIS — E10.22 TYPE 1 DIABETES MELLITUS WITH STAGE 3 CHRONIC KIDNEY DISEASE: ICD-10-CM

## 2017-10-02 PROCEDURE — 99999 PR PBB SHADOW E&M-EST. PATIENT-LVL V: CPT | Mod: PBBFAC,,, | Performed by: NURSE PRACTITIONER

## 2017-10-02 PROCEDURE — 99215 OFFICE O/P EST HI 40 MIN: CPT | Mod: S$GLB,,, | Performed by: NURSE PRACTITIONER

## 2017-10-02 RX ORDER — INSULIN GLARGINE 100 [IU]/ML
16 INJECTION, SOLUTION SUBCUTANEOUS DAILY
Qty: 1 BOX | Refills: 6 | Status: SHIPPED | OUTPATIENT
Start: 2017-10-02 | End: 2018-12-06 | Stop reason: SDUPTHER

## 2017-10-02 RX ORDER — INSULIN ASPART 100 [IU]/ML
INJECTION, SOLUTION INTRAVENOUS; SUBCUTANEOUS
Qty: 1 BOX | Refills: 6 | Status: SHIPPED | OUTPATIENT
Start: 2017-10-02 | End: 2018-08-16

## 2017-10-02 RX ORDER — PEN NEEDLE, DIABETIC 30 GX3/16"
NEEDLE, DISPOSABLE MISCELLANEOUS
Qty: 200 EACH | Refills: 3 | Status: SHIPPED | OUTPATIENT
Start: 2017-10-02 | End: 2021-10-25 | Stop reason: SDUPTHER

## 2017-10-02 NOTE — PATIENT INSTRUCTIONS
Hypoglycemia (Low Blood Sugar)     Fast-acting sugar includes a cup of nonfat milk.     Too little sugar (glucose) in your blood is called hypoglycemia or low blood sugar. Low blood sugar usually means anything lower than 70 mg/dL. Talk with your healthcare provider about your target range and what level is too low for you. Diabetes itself doesnt cause low blood sugar. But some of the treatments for diabetes, such as pills or insulin, may raise your risk for it. Low blood sugar may cause you to pass out or have a seizure. So always treat low blood sugar right away, but don't overeat.  Special note: Always carry a source of fast-acting sugar and a snack in case of hypoglycemia.   What you may notice  If you have low blood sugar, you may have one or more of these symptoms:  · Shakiness or dizziness  · Cold, clammy skin or sweating  · Feelings of hunger  · Headache  · Nervousness  · A hard, fast heartbeat  · Weakness  · Confusion or irritability  · Blurred vision  · Having nightmares or waking up confused or sweating  · Numbness or tingling in the lips or tongue  What you should do  Here are tips to follow if you have hypoglycemia:   · First check your blood sugar. If it is too low (out of your target range), eat or drink 15 to 20 grams of fast-acting sugar. This may be 3 to 4 glucose tablets, 4 ounces (half a cup) of fruit juice or regular (nondiet) soda, 8 ounces (1 cup) of fat-free milk, or 1 tablespoon of honey. Dont take more than this, or your blood sugar may go too high.  · Wait 15 minutes. Then recheck your blood sugar if you can.  · If your blood sugar is still too low, repeat the steps above and check your blood sugar again. If your blood sugar still has not returned to your target range, contact your healthcare provider or seek emergency care.  · Once your blood sugar returns to target range, eat a snack or meal.  Preventing low blood sugar  Things you can do include the following:   · If your condition  needs a strict treatment plan, eat your meals and snacks at the same times each day. Dont skip meals!  · If your treatment plan lets you change when you eat and what you eat, learn how to change the time and dose of your rapid-acting insulin to match this.   · Ask your healthcare provider if it is safe for you to drink alcohol. Never drink on an empty stomach.  · Take your medicine at the prescribed times.  · Always carry a source of fast-acting sugar and a snack when youre away from home.  Other things to do  Additional tips include the following:  · Carry a medical ID card, a compact USB drive, or wear a medical alert bracelet or necklace. It should say that you have diabetes. It should also say what to do if you pass out or have a seizure.  · Make sure your family, friends, and coworkers know the signs of low blood sugar. Tell them what to do if your blood sugar falls very low and you cant treat yourself.  · Keep a glucagon emergency kit handy. Be sure your family, friends, and coworkers know how and when to use it. Check it regularly and replace the glucagon before it expires.  · Talk with your health care team about other things you can do to prevent low blood sugar.     If you have unexplained hypoglycemia or hypoglycemia several times, call your healthcare provider.   Date Last Reviewed: 5/1/2016  © 0805-5337 [employer]. [address] [place] [address], [place] [address]. All rights reserved. This information is not intended as a substitute for professional medical care. Always follow your healthcare professional's instructions.

## 2017-10-02 NOTE — PROGRESS NOTES
CC: Mr. Narciso Becerra arrives today for management of Type 1  DM and review of chronic medical conditions, as listed in the visit diagnosis section of this encounter.     HPI: Mr. Narciso Becerra was diagnosed with Type 1  DM at age 18 after viral illness. Denies FH of DM. Denies hospitalizations due to DM.   Started Dexcom G4 in 2012.  He started insulin pump therapy with Omnipod in June 2015.    He reports he has been travelling often and feels this has been affecting his glucose levels. He is eating dinner very late at night.     He reports after changing site Saturday night, his Dexcom showed question yazmin icons. This resolved on Sunday.     BG readings are checked 6x/day.    Hypoglycemia: May occur once every few weeks  Hypoglycemic Symptoms: dizziness, jitteriness and sweating  Hypoglycemia Treatment: soda. He admits to overtreating by drinking large amount of soda and then eating sweets, leading to hyperglycemia.     Exercise: No formal but very active at work/home.     Dietary Habits: Eats 3 meals/day. Dinner is usually late. Avoids snacking and sugary beverages.     Last DM education appointment: 2015    Follows with Dr. Estrada every 3-6 months.     Has CAD, is s/p CABG in 2012. Followed by Dr. Cedeño. Has appt next week. Had recent PET scan.       CURRENT DIABETIC MEDS: Novolog in Omnipod insulin pump  Glucometer type: Freestyle strips    Name of Device or Devices used by the patient: Omnipod  When did you start the current therapy you are on: 6/2015  Frequency of changing site/infusion set/tubing/cartridges: 3 days  Frequency of changing CGM: 7 days  Using bolus wizard: yes  Taking bolus with each meal: yes    PUMP SETTINGS:  Basal:  0000 - 0.85 u/hr  0300 - 0.95 u/hr  0700 - 0.85 u/hr  1100 - 0.5 u/hr    I:C ratio:  0000 - 1:10  0500 - 1:8    ISF:  50    Target:   0000 - 100-140    Active insulin time: 4 hours      Last Eye Exam: 2/2017. + DR. Atif Weeks and Dr. Bronson (retina  "specialist) annually   Last Podiatry Exam: n/a    REVIEW OF SYSTEMS  Constitutional: no c/o fatigue, weakness, weight loss. + weight gain that he attributes to poor diet  Eyes: denies visual disturbances.  Cardiac: no palpitations or chest pain.  Respiratory: no cough or dyspnea.  GI: no c/o abdominal pain or nausea  Skin: no lesions or rashes.  Neuro: no numbness, tingling, or parasthesias.  Endocrine: denies polyphagia, polydipsia, polyuria      Personally reviewed Past Medical, Surgical, Social History.    Vital Signs  /66 (BP Location: Right arm, Patient Position: Sitting, BP Method: Large (Manual))   Pulse 80   Ht 5' 10" (1.778 m)   Wt 79.8 kg (175 lb 14.8 oz)   BMI 25.24 kg/m²     Personally reviewed the below labs:    Hemoglobin A1C   Date Value Ref Range Status   09/25/2017 8.2 (H) 4.0 - 5.6 % Final     Comment:     According to ADA guidelines, hemoglobin A1c <7.0% represents  optimal control in non-pregnant diabetic patients. Different  metrics may apply to specific patient populations.   Standards of Medical Care in Diabetes-2016.  For the purpose of screening for the presence of diabetes:  <5.7%     Consistent with the absence of diabetes  5.7-6.4%  Consistent with increasing risk for diabetes   (prediabetes)  >or=6.5%  Consistent with diabetes  Currently, no consensus exists for use of hemoglobin A1c  for diagnosis of diabetes for children.  This Hemoglobin A1c assay has significant interference with fetal   hemoglobin   (HbF). The results are invalid for patients with abnormal amounts of   HbF,   including those with known Hereditary Persistence   of Fetal Hemoglobin. Heterozygous hemoglobin variants (HbAS, HbAC,   HbAD, HbAE, HbA2) do not significantly interfere with this assay;   however, presence of multiple variants in a sample may impact the %   interference.     06/15/2017 7.7 (H) 4.0 - 5.6 % Final     Comment:     According to ADA guidelines, hemoglobin A1c <7.0% represents  optimal " control in non-pregnant diabetic patients. Different  metrics may apply to specific patient populations.   Standards of Medical Care in Diabetes-2016.  For the purpose of screening for the presence of diabetes:  <5.7%     Consistent with the absence of diabetes  5.7-6.4%  Consistent with increasing risk for diabetes   (prediabetes)  >or=6.5%  Consistent with diabetes  Currently, no consensus exists for use of hemoglobin A1c  for diagnosis of diabetes for children.  This Hemoglobin A1c assay has significant interference with fetal   hemoglobin   (HbF). The results are invalid for patients with abnormal amounts of   HbF,   including those with known Hereditary Persistence   of Fetal Hemoglobin. Heterozygous hemoglobin variants (HbAS, HbAC,   HbAD, HbAE, HbA2) do not significantly interfere with this assay;   however, presence of multiple variants in a sample may impact the %   interference.     03/13/2017 7.9 (H) 4.5 - 6.2 % Final     Comment:     According to ADA guidelines, hemoglobin A1C <7.0% represents  optimal control in non-pregnant diabetic patients.  Different  metrics may apply to specific populations.   Standards of Medical Care in Diabetes - 2016.  For the purpose of screening for the presence of diabetes:  <5.7%     Consistent with the absence of diabetes  5.7-6.4%  Consistent with increasing risk for diabetes   (prediabetes)  >or=6.5%  Consistent with diabetes  Currently no consensus exists for use of hemoglobin A1C  for diagnosis of diabetes for children.         Chemistry        Component Value Date/Time     09/25/2017 0827    K 4.6 09/25/2017 0827     09/25/2017 0827    CO2 31 (H) 09/25/2017 0827    BUN 16 09/25/2017 0827    CREATININE 1.6 (H) 09/25/2017 0827     (H) 09/25/2017 0827        Component Value Date/Time    CALCIUM 9.5 09/25/2017 0827    ALKPHOS 69 09/25/2017 0827    AST 22 09/25/2017 0827    ALT 23 09/25/2017 0827    BILITOT 0.5 09/25/2017 0827          Lab Results    Component Value Date    CHOL 169 09/25/2017    CHOL 156 10/18/2016    CHOL 164 04/18/2016     Lab Results   Component Value Date    HDL 65 09/25/2017    HDL 62 10/18/2016    HDL 60 04/18/2016     Lab Results   Component Value Date    LDLCALC 84.4 09/25/2017    LDLCALC 67.4 10/18/2016    LDLCALC 83.8 04/18/2016     Lab Results   Component Value Date    TRIG 98 09/25/2017    TRIG 133 10/18/2016    TRIG 101 04/18/2016     Lab Results   Component Value Date    CHOLHDL 38.5 09/25/2017    CHOLHDL 39.7 10/18/2016    CHOLHDL 36.6 04/18/2016       Lab Results   Component Value Date    MICALBCREAT 8.4 08/24/2015     Lab Results   Component Value Date    TSH 3.219 06/15/2017       CrCl cannot be calculated (Patient's most recent lab result is older than the maximum 7 days allowed.).    No results found for: [id number]      PHYSICAL EXAMINATION  Constitutional: Appears well, no distress  Neck: Supple, trachea midline; no thyromegaly or nodules.   Respiratory: CTA, even and unlabored.  Cardiovascular: RRR, no murmurs, no carotid bruits. DP pulses  2+ bilaterally; no edema.    Lymph: no cervical or supraclavicular lymphadenopathy  Skin: warm and dry; no lipohypertrophy, or acanthosis nigracans observed.  Neuro: DTR 1+ BUE/1+BLE. Previously, no loss of protective sensation via 10 gm monofilament. Vibratory exam decreased bilaterally  Feet: appropriate footwear.    PUMP DOWNLOAD: see media file for details. Glucoses are highly variable. Difficult to  on pattern. Rare hypoglycemia (occurred after carb bolus). BG remain elevated after receiving correction only. Uses bolus wizard.   7 day averages ---   Average TDD - 31.4 u  Average bolus - (51%)  16.1 units  Average basal - (49%)  15.3 units                  DEXCOM DOWNLOAD: Glucoses are variable. Rare hypoglycemia but this seems to occur during day.  Prandial excursions noted.                 A1c goal 7-8%       Assessment/Plan  1. Type 1 diabetes mellitus with diabetic  polyneuropathy     Hypoglycemia    -- A1c has increased. Fasting glucose was Suspect some dietary indiscretion causing prandial excursions. Also continues to overcorrect if having hypoglycemia. This causes rebound hyperglycemia and he then takes correction bolus for it.  -- Explained appropriate ways to treat hypoglycemia. Advised against taking correction after treating a low or eating a snack. Should cover carbs contained in snacks with carb bolus.   -- Change ISF to 45  -- Change I:C ratio to 1:8 across the board  -- May need basal rate increase in the future.   -- Check BG 6x/day      -- Discussed diagnosis of DM, A1c goals, progression of disease, long term complications and tx options.  Advised patient to check BG before activities, such as driving or exercise.  -- Reviewed hypoglycemia management: treat with 1/2 glass of juice, 1/2 can regular coke, or 4 glucose tablets. Monitor and repeat treatment every 15 minutes until BG is >70 Then have a snack, which includes a complex carbohydrate and protein.   2. Type 1 diabetes mellitus with retinopathy, macular edema presence unspecified, unspecified laterality, unspecified retinopathy severity  -- keep follow ups   3. Type 1 diabetes mellitus with stage 3 chronic kidney disease  -- avoid insulin stacking, hypoglycemia  -- follows with nephrology   4. Coronary artery disease due to calcified coronary lesion  -- avoid hypoglycemia    5. Hypertension associated with diabetes  -- controlled, on ace-i   6. Hyperlipidemia due to type 1 diabetes mellitus  -- controlled, on statin   7. Insulin pump adjustment -- download reviewed and changes made     Total Time and Counselin minutes, >50% time spent counseling as noted above in #1 A/P.       FOLLOW UP  Return in about 3 months (around 2018).   Patient instructed to bring BG logs to each follow up   Patient encouraged to call for any BG/medication issues, concerns, or questions.    Orders Placed This Encounter    Procedures    Hemoglobin A1c    Basic metabolic panel

## 2017-10-03 ENCOUNTER — PATIENT MESSAGE (OUTPATIENT)
Dept: ENDOCRINOLOGY | Facility: CLINIC | Age: 62
End: 2017-10-03

## 2017-10-11 DIAGNOSIS — G90.3 NEUROGENIC ORTHOSTATIC HYPOTENSION: ICD-10-CM

## 2017-10-11 RX ORDER — FOLIC ACID 1 MG/1
1 TABLET ORAL DAILY
Qty: 30 TABLET | Refills: 3 | Status: SHIPPED | OUTPATIENT
Start: 2017-10-11 | End: 2018-02-24 | Stop reason: SDUPTHER

## 2017-11-10 ENCOUNTER — LAB VISIT (OUTPATIENT)
Dept: LAB | Facility: HOSPITAL | Age: 62
End: 2017-11-10
Attending: INTERNAL MEDICINE
Payer: COMMERCIAL

## 2017-11-10 DIAGNOSIS — N40.0 BENIGN ENLARGEMENT OF PROSTATE: ICD-10-CM

## 2017-11-10 DIAGNOSIS — E11.9 DIABETES MELLITUS WITHOUT COMPLICATION: ICD-10-CM

## 2017-11-10 DIAGNOSIS — N18.30 CHRONIC KIDNEY DISEASE, STAGE III (MODERATE): ICD-10-CM

## 2017-11-10 DIAGNOSIS — D63.1 ANEMIA OF CHRONIC RENAL FAILURE: Primary | ICD-10-CM

## 2017-11-10 DIAGNOSIS — N18.2 CHRONIC KIDNEY DISEASE, STAGE II (MILD): ICD-10-CM

## 2017-11-10 DIAGNOSIS — R80.9 PROTEINURIA: ICD-10-CM

## 2017-11-10 DIAGNOSIS — N18.9 ANEMIA OF CHRONIC RENAL FAILURE: Primary | ICD-10-CM

## 2017-11-10 DIAGNOSIS — I12.9 PARENCHYMAL RENAL HYPERTENSION: ICD-10-CM

## 2017-11-10 LAB
ALBUMIN SERPL BCP-MCNC: 3.7 G/DL
ALP SERPL-CCNC: 74 U/L
ALT SERPL W/O P-5'-P-CCNC: 25 U/L
ANION GAP SERPL CALC-SCNC: 6 MMOL/L
AST SERPL-CCNC: 28 U/L
BASOPHILS # BLD AUTO: 0.04 K/UL
BASOPHILS NFR BLD: 0.6 %
BILIRUB SERPL-MCNC: 0.6 MG/DL
BUN SERPL-MCNC: 17 MG/DL
CALCIUM SERPL-MCNC: 9.7 MG/DL
CHLORIDE SERPL-SCNC: 100 MMOL/L
CO2 SERPL-SCNC: 28 MMOL/L
CREAT SERPL-MCNC: 1.4 MG/DL
DIFFERENTIAL METHOD: ABNORMAL
EOSINOPHIL # BLD AUTO: 0.2 K/UL
EOSINOPHIL NFR BLD: 2.4 %
ERYTHROCYTE [DISTWIDTH] IN BLOOD BY AUTOMATED COUNT: 12.3 %
EST. GFR  (AFRICAN AMERICAN): >60 ML/MIN/1.73 M^2
EST. GFR  (NON AFRICAN AMERICAN): 52.7 ML/MIN/1.73 M^2
FERRITIN SERPL-MCNC: 65 NG/ML
GLUCOSE SERPL-MCNC: 383 MG/DL
HCT VFR BLD AUTO: 33.9 %
HGB BLD-MCNC: 11.8 G/DL
IMM GRANULOCYTES # BLD AUTO: 0.03 K/UL
IMM GRANULOCYTES NFR BLD AUTO: 0.5 %
IRON SERPL-MCNC: 79 UG/DL
LYMPHOCYTES # BLD AUTO: 1.3 K/UL
LYMPHOCYTES NFR BLD: 20.7 %
MCH RBC QN AUTO: 31.8 PG
MCHC RBC AUTO-ENTMCNC: 34.8 G/DL
MCV RBC AUTO: 91 FL
MONOCYTES # BLD AUTO: 0.4 K/UL
MONOCYTES NFR BLD: 6.6 %
NEUTROPHILS # BLD AUTO: 4.4 K/UL
NEUTROPHILS NFR BLD: 69.2 %
NRBC BLD-RTO: 0 /100 WBC
PLATELET # BLD AUTO: 284 K/UL
PMV BLD AUTO: 10.1 FL
POTASSIUM SERPL-SCNC: 5.3 MMOL/L
PROT SERPL-MCNC: 7.4 G/DL
PTH-INTACT SERPL-MCNC: 42 PG/ML
RBC # BLD AUTO: 3.71 M/UL
SATURATED IRON: 19 %
SODIUM SERPL-SCNC: 134 MMOL/L
TOTAL IRON BINDING CAPACITY: 411 UG/DL
TRANSFERRIN SERPL-MCNC: 278 MG/DL
WBC # BLD AUTO: 6.37 K/UL

## 2017-11-10 PROCEDURE — 83540 ASSAY OF IRON: CPT

## 2017-11-10 PROCEDURE — 80053 COMPREHEN METABOLIC PANEL: CPT

## 2017-11-10 PROCEDURE — 36415 COLL VENOUS BLD VENIPUNCTURE: CPT | Mod: PO

## 2017-11-10 PROCEDURE — 83970 ASSAY OF PARATHORMONE: CPT

## 2017-11-10 PROCEDURE — 82728 ASSAY OF FERRITIN: CPT

## 2017-11-10 PROCEDURE — 85025 COMPLETE CBC W/AUTO DIFF WBC: CPT

## 2017-11-21 DIAGNOSIS — J06.9 URI (UPPER RESPIRATORY INFECTION): ICD-10-CM

## 2017-11-21 RX ORDER — FLUTICASONE PROPIONATE 50 MCG
SPRAY, SUSPENSION (ML) NASAL
Qty: 16 G | Refills: 11 | Status: ON HOLD | OUTPATIENT
Start: 2017-11-21 | End: 2020-07-07 | Stop reason: HOSPADM

## 2017-11-27 RX ORDER — INSULIN ASPART 100 [IU]/ML
INJECTION, SOLUTION INTRAVENOUS; SUBCUTANEOUS
Qty: 20 ML | Refills: 6 | Status: SHIPPED | OUTPATIENT
Start: 2017-11-27 | End: 2018-01-02

## 2017-11-30 DIAGNOSIS — E10.8 TYPE 1 DIABETES MELLITUS WITH COMPLICATION: Primary | ICD-10-CM

## 2017-12-04 ENCOUNTER — OFFICE VISIT (OUTPATIENT)
Dept: FAMILY MEDICINE | Facility: CLINIC | Age: 62
End: 2017-12-04
Payer: COMMERCIAL

## 2017-12-04 VITALS
HEART RATE: 73 BPM | TEMPERATURE: 98 F | BODY MASS INDEX: 25.25 KG/M2 | WEIGHT: 176.38 LBS | DIASTOLIC BLOOD PRESSURE: 63 MMHG | HEIGHT: 70 IN | SYSTOLIC BLOOD PRESSURE: 126 MMHG

## 2017-12-04 DIAGNOSIS — F43.20 ADULT SITUATIONAL STRESS DISORDER: Chronic | ICD-10-CM

## 2017-12-04 DIAGNOSIS — Z79.4 ENCOUNTER FOR LONG-TERM (CURRENT) USE OF INSULIN: ICD-10-CM

## 2017-12-04 DIAGNOSIS — Z11.59 NEED FOR HEPATITIS C SCREENING TEST: Primary | ICD-10-CM

## 2017-12-04 DIAGNOSIS — E29.1 HYPOGONADISM MALE: ICD-10-CM

## 2017-12-04 DIAGNOSIS — N18.30 CKD (CHRONIC KIDNEY DISEASE) STAGE 3, GFR 30-59 ML/MIN: Chronic | ICD-10-CM

## 2017-12-04 PROCEDURE — 99999 PR PBB SHADOW E&M-EST. PATIENT-LVL III: CPT | Mod: PBBFAC,,, | Performed by: FAMILY MEDICINE

## 2017-12-04 PROCEDURE — 99214 OFFICE O/P EST MOD 30 MIN: CPT | Mod: S$GLB,,, | Performed by: FAMILY MEDICINE

## 2017-12-04 NOTE — PROGRESS NOTES
Subjective:       Patient ID: Narciso Becerra is a 62 y.o. male.    Chief Complaint: Hypertension and Diabetes    HPI  Review of Systems    Patient Active Problem List   Diagnosis    CKD (chronic kidney disease) stage 3, GFR 30-59 ml/min    Coronary artery disease    Type 1 diabetes mellitus with diabetic polyneuropathy    Peripheral neuropathy    Insulin pump fitting or adjustment    Hypertension associated with diabetes    Hyperlipidemia due to type 1 diabetes mellitus    Angina of effort    Encounter for long-term (current) use of insulin    Adult situational stress disorder    Type 1 diabetes mellitus with retinopathy    Type 1 diabetes mellitus with stage 3 chronic kidney disease       Objective:      Physical Exam    Lab Results   Component Value Date    WBC 6.37 11/10/2017    HGB 11.8 (L) 11/10/2017    HCT 33.9 (L) 11/10/2017     11/10/2017    CHOL 169 09/25/2017    TRIG 98 09/25/2017    HDL 65 09/25/2017    ALT 25 11/10/2017    AST 28 11/10/2017     (L) 11/10/2017    K 5.3 (H) 11/10/2017     11/10/2017    CREATININE 1.4 11/10/2017    BUN 17 11/10/2017    CO2 28 11/10/2017    TSH 3.219 06/15/2017    PSA 0.46 06/15/2017    HGBA1C 8.2 (H) 09/25/2017     The 10-year ASCVD risk score (Yocasta LEWIS Jr., et al., 2013) is: 18.6%    Values used to calculate the score:      Age: 62 years      Sex: Male      Is Non- : No      Diabetic: Yes      Tobacco smoker: No      Systolic Blood Pressure: 126 mmHg      Is BP treated: Yes      HDL Cholesterol: 65 mg/dL      Total Cholesterol: 169 mg/dL    Assessment:       1. Need for hepatitis C screening test    2. CKD (chronic kidney disease) stage 3, GFR 30-59 ml/min    3. Encounter for long-term (current) use of insulin    4. Adult situational stress disorder        Plan:       Need for hepatitis C screening test    CKD (chronic kidney disease) stage 3, GFR 30-59 ml/min    Encounter for long-term (current) use of  insulin    Adult situational stress disorder      Patient readiness: acceptance and barriers:none    During the course of the visit the patient was educated and counseled about the following:     Diabetes:  Discussed general issues about diabetes pathophysiology and management.  Hypertension:   Dietary sodium restriction.    Goals: Diabetes: Maintain Hemoglobin A1C below 7 and Hypertension: Reduce Blood Pressure    Did patient meet goals/outcomes: Yes    The following self management tools provided: blood pressure log  excercise log    Patient Instructions (the written plan) was given to the patient/family.     Time spent with patient: 30 minutes

## 2018-01-02 RX ORDER — INSULIN LISPRO 100 [IU]/ML
INJECTION, SOLUTION INTRAVENOUS; SUBCUTANEOUS
Qty: 20 ML | Refills: 6 | Status: SHIPPED | OUTPATIENT
Start: 2018-01-02 | End: 2018-01-03 | Stop reason: SDUPTHER

## 2018-01-02 NOTE — TELEPHONE ENCOUNTER
[facility] sent fax stating novolog is no longer preferred.  Humalog is covered by pt's insurance.  Will pend and route to KLAUDIA Hawk.

## 2018-01-03 RX ORDER — INSULIN LISPRO 100 [IU]/ML
INJECTION, SOLUTION INTRAVENOUS; SUBCUTANEOUS
Qty: 20 ML | Refills: 6 | Status: SHIPPED | OUTPATIENT
Start: 2018-01-03 | End: 2018-05-24

## 2018-01-04 ENCOUNTER — LAB VISIT (OUTPATIENT)
Dept: LAB | Facility: HOSPITAL | Age: 63
End: 2018-01-04
Attending: NURSE PRACTITIONER
Payer: COMMERCIAL

## 2018-01-04 DIAGNOSIS — E29.1 HYPOGONADISM MALE: ICD-10-CM

## 2018-01-04 DIAGNOSIS — E10.42 TYPE 1 DIABETES MELLITUS WITH DIABETIC POLYNEUROPATHY: ICD-10-CM

## 2018-01-04 DIAGNOSIS — Z11.59 NEED FOR HEPATITIS C SCREENING TEST: ICD-10-CM

## 2018-01-04 LAB
ANION GAP SERPL CALC-SCNC: 9 MMOL/L
BUN SERPL-MCNC: 18 MG/DL
CALCIUM SERPL-MCNC: 9.8 MG/DL
CHLORIDE SERPL-SCNC: 102 MMOL/L
CO2 SERPL-SCNC: 27 MMOL/L
CREAT SERPL-MCNC: 1.4 MG/DL
EST. GFR  (AFRICAN AMERICAN): >60 ML/MIN/1.73 M^2
EST. GFR  (NON AFRICAN AMERICAN): 52.7 ML/MIN/1.73 M^2
ESTIMATED AVG GLUCOSE: 192 MG/DL
GLUCOSE SERPL-MCNC: 132 MG/DL
HBA1C MFR BLD HPLC: 8.3 %
POTASSIUM SERPL-SCNC: 4.8 MMOL/L
PROLACTIN SERPL IA-MCNC: 14.6 NG/ML
SODIUM SERPL-SCNC: 138 MMOL/L
T4 FREE SERPL-MCNC: 0.94 NG/DL
TESTOST SERPL-MCNC: 518 NG/DL
TSH SERPL DL<=0.005 MIU/L-ACNC: 5.11 UIU/ML

## 2018-01-04 PROCEDURE — 84403 ASSAY OF TOTAL TESTOSTERONE: CPT

## 2018-01-04 PROCEDURE — 84402 ASSAY OF FREE TESTOSTERONE: CPT

## 2018-01-04 PROCEDURE — 84146 ASSAY OF PROLACTIN: CPT

## 2018-01-04 PROCEDURE — 80048 BASIC METABOLIC PNL TOTAL CA: CPT

## 2018-01-04 PROCEDURE — 36415 COLL VENOUS BLD VENIPUNCTURE: CPT | Mod: PO

## 2018-01-04 PROCEDURE — 84443 ASSAY THYROID STIM HORMONE: CPT

## 2018-01-04 PROCEDURE — 86803 HEPATITIS C AB TEST: CPT

## 2018-01-04 PROCEDURE — 83036 HEMOGLOBIN GLYCOSYLATED A1C: CPT

## 2018-01-04 PROCEDURE — 84439 ASSAY OF FREE THYROXINE: CPT

## 2018-01-05 LAB — HCV AB SERPL QL IA: NEGATIVE

## 2018-01-08 LAB — TESTOST FREE SERPL-MCNC: 7.2 PG/ML

## 2018-01-25 DIAGNOSIS — F41.1 GAD (GENERALIZED ANXIETY DISORDER): ICD-10-CM

## 2018-01-25 DIAGNOSIS — F43.20 ADULT SITUATIONAL STRESS DISORDER: Chronic | ICD-10-CM

## 2018-01-26 RX ORDER — ESCITALOPRAM OXALATE 10 MG/1
TABLET ORAL
Qty: 30 TABLET | Refills: 2 | Status: SHIPPED | OUTPATIENT
Start: 2018-01-26 | End: 2018-03-26 | Stop reason: SDUPTHER

## 2018-02-02 ENCOUNTER — TELEPHONE (OUTPATIENT)
Dept: ENDOCRINOLOGY | Facility: CLINIC | Age: 63
End: 2018-02-02

## 2018-02-02 DIAGNOSIS — E10.9 TYPE 1 DIABETES MELLITUS WITHOUT COMPLICATION: Primary | ICD-10-CM

## 2018-02-02 NOTE — TELEPHONE ENCOUNTER
----- Message from Antonia Mcbride sent at 2/1/2018  3:56 PM CST -----  Contact: [facility]  [facility] has faxed over 1/19/18 a request cand not gotten a response ,the pt insurance is requiring prior authorization for the insulin pump freestyle test strips...[phone] ext [phone]

## 2018-02-02 NOTE — TELEPHONE ENCOUNTER
Spoke w/ Iraj w/ [facility].  States he faxed form to [facility] 2 weeks ago.  Will refax it to [place].  I informed him no paperwork was rec'd here in [place].

## 2018-02-05 ENCOUNTER — TELEPHONE (OUTPATIENT)
Dept: ENDOCRINOLOGY | Facility: CLINIC | Age: 63
End: 2018-02-05

## 2018-02-08 NOTE — TELEPHONE ENCOUNTER
----- Message from Elke Monroe sent at 2/8/2018  2:50 PM CST -----  Patient needs to get a refill on his blood sugar diagnostic Strp remitted to:      [facility]/pharmacy #[id number] - [place] - [id number]  [id number]  [place] [address]  Phone: [phone] Fax: [phone]    Please call patient when completed at [phone]. He has changed his medication insurer and needs these strips prescribed and refilled at this pharmacy. He tests 6 to 7 times a day.

## 2018-02-24 DIAGNOSIS — G90.3 NEUROGENIC ORTHOSTATIC HYPOTENSION: ICD-10-CM

## 2018-02-26 RX ORDER — FOLIC ACID 1 MG/1
1 TABLET ORAL DAILY
Qty: 30 TABLET | Refills: 3 | Status: SHIPPED | OUTPATIENT
Start: 2018-02-26 | End: 2018-04-12 | Stop reason: SDUPTHER

## 2018-02-27 ENCOUNTER — PATIENT MESSAGE (OUTPATIENT)
Dept: ENDOCRINOLOGY | Facility: CLINIC | Age: 63
End: 2018-02-27

## 2018-02-27 ENCOUNTER — TELEPHONE (OUTPATIENT)
Dept: ENDOCRINOLOGY | Facility: CLINIC | Age: 63
End: 2018-02-27

## 2018-02-27 NOTE — TELEPHONE ENCOUNTER
Called and left voicemail for patient regarding insulin pump setting adjustment the night before and morning of surgery.  I recommended that he utilize the Temp basal feature on his Omnipod pump in order to receive 20% less insulin than his preset basal rates, from the time he is fasting to when he eats (or drinks) carbs again. Instructions sent to patient over patient portal. One of our diabetes educators will attempt to reach patient again to walk him through the steps.

## 2018-02-28 NOTE — TELEPHONE ENCOUNTER
Contacted patient to give him instructions on how to set temporary basal feature for his surgery next week.  Walked him through how to set it up on his PDM.  Instructed him to set it at 20% decrease in normal rate for 12 hours or at time he starts fasting.  Patient verbalized understanding and will call with any further questions.

## 2018-03-26 DIAGNOSIS — F41.1 GAD (GENERALIZED ANXIETY DISORDER): ICD-10-CM

## 2018-03-26 DIAGNOSIS — F43.20 ADULT SITUATIONAL STRESS DISORDER: Chronic | ICD-10-CM

## 2018-03-26 RX ORDER — ESCITALOPRAM OXALATE 10 MG/1
10 TABLET ORAL DAILY
Qty: 90 TABLET | Refills: 2 | Status: SHIPPED | OUTPATIENT
Start: 2018-03-26 | End: 2019-01-29 | Stop reason: SDUPTHER

## 2018-04-12 ENCOUNTER — TELEPHONE (OUTPATIENT)
Dept: ENDOCRINOLOGY | Facility: CLINIC | Age: 63
End: 2018-04-12

## 2018-04-12 DIAGNOSIS — G90.3 NEUROGENIC ORTHOSTATIC HYPOTENSION: ICD-10-CM

## 2018-04-12 RX ORDER — FOLIC ACID 1 MG/1
1 TABLET ORAL DAILY
Qty: 90 TABLET | Refills: 3 | Status: SHIPPED | OUTPATIENT
Start: 2018-04-12 | End: 2019-05-04 | Stop reason: SDUPTHER

## 2018-04-12 NOTE — TELEPHONE ENCOUNTER
----- Message from Magalie Marie sent at 4/12/2018  1:57 PM CDT -----  Contact: pt  Pt calling a request for a physicians order for his insulin pump, Omni pods, dexcom G4 sensors,  please call back and let pt know when script is ready.        Call back# [phone]  Thanks

## 2018-05-15 ENCOUNTER — TELEPHONE (OUTPATIENT)
Dept: ENDOCRINOLOGY | Facility: CLINIC | Age: 63
End: 2018-05-15

## 2018-05-15 DIAGNOSIS — E78.5 HYPERLIPIDEMIA, UNSPECIFIED HYPERLIPIDEMIA TYPE: Primary | ICD-10-CM

## 2018-05-15 NOTE — TELEPHONE ENCOUNTER
----- Message from Anaya Paniagua sent at 5/15/2018 12:34 PM CDT -----  Pt is coming in for labs tomorrow/ requesting a cholesterol order to be added .. Please call [phone]

## 2018-05-15 NOTE — TELEPHONE ENCOUNTER
Spoke with pt, advised order was added as requested, advised to fast for labs, voiced understanding

## 2018-05-16 ENCOUNTER — LAB VISIT (OUTPATIENT)
Dept: LAB | Facility: HOSPITAL | Age: 63
End: 2018-05-16
Attending: NURSE PRACTITIONER
Payer: COMMERCIAL

## 2018-05-16 DIAGNOSIS — N40.0 BENIGN ENLARGEMENT OF PROSTATE: ICD-10-CM

## 2018-05-16 DIAGNOSIS — N18.9 ANEMIA OF CHRONIC RENAL FAILURE: Primary | ICD-10-CM

## 2018-05-16 DIAGNOSIS — E10.9 TYPE 1 DIABETES MELLITUS WITHOUT COMPLICATION: ICD-10-CM

## 2018-05-16 DIAGNOSIS — D63.1 ANEMIA OF CHRONIC RENAL FAILURE: Primary | ICD-10-CM

## 2018-05-16 DIAGNOSIS — E11.9 DIABETES MELLITUS WITHOUT COMPLICATION: ICD-10-CM

## 2018-05-16 DIAGNOSIS — N18.30 CHRONIC KIDNEY DISEASE, STAGE III (MODERATE): ICD-10-CM

## 2018-05-16 DIAGNOSIS — E78.5 HYPERLIPIDEMIA, UNSPECIFIED HYPERLIPIDEMIA TYPE: ICD-10-CM

## 2018-05-16 DIAGNOSIS — I12.9 PARENCHYMAL RENAL HYPERTENSION: ICD-10-CM

## 2018-05-16 DIAGNOSIS — N18.2 CHRONIC KIDNEY DISEASE, STAGE II (MILD): ICD-10-CM

## 2018-05-16 DIAGNOSIS — R80.9 PROTEINURIA: ICD-10-CM

## 2018-05-16 LAB
ALBUMIN SERPL BCP-MCNC: 4 G/DL
ALP SERPL-CCNC: 61 U/L
ALT SERPL W/O P-5'-P-CCNC: 21 U/L
ANION GAP SERPL CALC-SCNC: 12 MMOL/L
ANION GAP SERPL CALC-SCNC: 8 MMOL/L
AST SERPL-CCNC: 22 U/L
BASOPHILS # BLD AUTO: 0.02 K/UL
BASOPHILS NFR BLD: 0.4 %
BILIRUB SERPL-MCNC: 0.5 MG/DL
BUN SERPL-MCNC: 18 MG/DL
BUN SERPL-MCNC: 18 MG/DL
CALCIUM SERPL-MCNC: 10 MG/DL
CALCIUM SERPL-MCNC: 9.7 MG/DL
CHLORIDE SERPL-SCNC: 103 MMOL/L
CHLORIDE SERPL-SCNC: 104 MMOL/L
CHOLEST SERPL-MCNC: 170 MG/DL
CHOLEST/HDLC SERPL: 2.7 {RATIO}
CO2 SERPL-SCNC: 23 MMOL/L
CO2 SERPL-SCNC: 27 MMOL/L
CREAT SERPL-MCNC: 1.3 MG/DL
CREAT SERPL-MCNC: 1.4 MG/DL
CREAT UR-MCNC: 59 MG/DL
DIFFERENTIAL METHOD: ABNORMAL
EOSINOPHIL # BLD AUTO: 0.2 K/UL
EOSINOPHIL NFR BLD: 3.9 %
ERYTHROCYTE [DISTWIDTH] IN BLOOD BY AUTOMATED COUNT: 12.4 %
EST. GFR  (AFRICAN AMERICAN): >60 ML/MIN/1.73 M^2
EST. GFR  (AFRICAN AMERICAN): >60 ML/MIN/1.73 M^2
EST. GFR  (NON AFRICAN AMERICAN): 52.7 ML/MIN/1.73 M^2
EST. GFR  (NON AFRICAN AMERICAN): 57.7 ML/MIN/1.73 M^2
ESTIMATED AVG GLUCOSE: 183 MG/DL
GLUCOSE SERPL-MCNC: 180 MG/DL
GLUCOSE SERPL-MCNC: 185 MG/DL
HBA1C MFR BLD HPLC: 8 %
HCT VFR BLD AUTO: 35.1 %
HDLC SERPL-MCNC: 64 MG/DL
HDLC SERPL: 37.6 %
HGB BLD-MCNC: 12.1 G/DL
IMM GRANULOCYTES # BLD AUTO: 0.02 K/UL
IMM GRANULOCYTES NFR BLD AUTO: 0.4 %
LDLC SERPL CALC-MCNC: 82.6 MG/DL
LYMPHOCYTES # BLD AUTO: 1.5 K/UL
LYMPHOCYTES NFR BLD: 26 %
MCH RBC QN AUTO: 33.4 PG
MCHC RBC AUTO-ENTMCNC: 34.5 G/DL
MCV RBC AUTO: 97 FL
MONOCYTES # BLD AUTO: 0.5 K/UL
MONOCYTES NFR BLD: 8.6 %
NEUTROPHILS # BLD AUTO: 3.4 K/UL
NEUTROPHILS NFR BLD: 60.7 %
NONHDLC SERPL-MCNC: 106 MG/DL
NRBC BLD-RTO: 0 /100 WBC
PLATELET # BLD AUTO: 289 K/UL
PMV BLD AUTO: 10.2 FL
POTASSIUM SERPL-SCNC: 4.9 MMOL/L
POTASSIUM SERPL-SCNC: 5.7 MMOL/L
PROT SERPL-MCNC: 7.2 G/DL
PROT UR-MCNC: <7 MG/DL
PROT/CREAT RATIO, UR: NORMAL
RBC # BLD AUTO: 3.62 M/UL
SODIUM SERPL-SCNC: 138 MMOL/L
SODIUM SERPL-SCNC: 139 MMOL/L
TRIGL SERPL-MCNC: 117 MG/DL
WBC # BLD AUTO: 5.58 K/UL

## 2018-05-16 PROCEDURE — 83036 HEMOGLOBIN GLYCOSYLATED A1C: CPT

## 2018-05-16 PROCEDURE — 80061 LIPID PANEL: CPT

## 2018-05-16 PROCEDURE — 84156 ASSAY OF PROTEIN URINE: CPT

## 2018-05-16 PROCEDURE — 85025 COMPLETE CBC W/AUTO DIFF WBC: CPT

## 2018-05-16 PROCEDURE — 36415 COLL VENOUS BLD VENIPUNCTURE: CPT | Mod: PO

## 2018-05-16 PROCEDURE — 80053 COMPREHEN METABOLIC PANEL: CPT

## 2018-05-16 PROCEDURE — 80048 BASIC METABOLIC PNL TOTAL CA: CPT

## 2018-05-24 ENCOUNTER — OFFICE VISIT (OUTPATIENT)
Dept: ENDOCRINOLOGY | Facility: CLINIC | Age: 63
End: 2018-05-24
Payer: COMMERCIAL

## 2018-05-24 VITALS
HEART RATE: 72 BPM | HEIGHT: 70 IN | SYSTOLIC BLOOD PRESSURE: 110 MMHG | WEIGHT: 178.63 LBS | DIASTOLIC BLOOD PRESSURE: 62 MMHG | BODY MASS INDEX: 25.57 KG/M2

## 2018-05-24 DIAGNOSIS — Z46.81 INSULIN PUMP FITTING OR ADJUSTMENT: ICD-10-CM

## 2018-05-24 DIAGNOSIS — I15.2 HYPERTENSION ASSOCIATED WITH DIABETES: ICD-10-CM

## 2018-05-24 DIAGNOSIS — E10.42 TYPE 1 DIABETES MELLITUS WITH DIABETIC POLYNEUROPATHY: Primary | ICD-10-CM

## 2018-05-24 DIAGNOSIS — E10.69 HYPERLIPIDEMIA DUE TO TYPE 1 DIABETES MELLITUS: ICD-10-CM

## 2018-05-24 DIAGNOSIS — E10.649 HYPOGLYCEMIA DUE TO TYPE 1 DIABETES MELLITUS: ICD-10-CM

## 2018-05-24 DIAGNOSIS — E11.59 HYPERTENSION ASSOCIATED WITH DIABETES: ICD-10-CM

## 2018-05-24 DIAGNOSIS — E10.319 TYPE 1 DIABETES MELLITUS WITH RETINOPATHY, MACULAR EDEMA PRESENCE UNSPECIFIED, UNSPECIFIED LATERALITY, UNSPECIFIED RETINOPATHY SEVERITY: ICD-10-CM

## 2018-05-24 DIAGNOSIS — N18.30 TYPE 1 DIABETES MELLITUS WITH STAGE 3 CHRONIC KIDNEY DISEASE: ICD-10-CM

## 2018-05-24 DIAGNOSIS — I25.10 CORONARY ARTERY DISEASE DUE TO CALCIFIED CORONARY LESION: ICD-10-CM

## 2018-05-24 DIAGNOSIS — E78.5 HYPERLIPIDEMIA DUE TO TYPE 1 DIABETES MELLITUS: ICD-10-CM

## 2018-05-24 DIAGNOSIS — I25.84 CORONARY ARTERY DISEASE DUE TO CALCIFIED CORONARY LESION: ICD-10-CM

## 2018-05-24 DIAGNOSIS — E10.22 TYPE 1 DIABETES MELLITUS WITH STAGE 3 CHRONIC KIDNEY DISEASE: ICD-10-CM

## 2018-05-24 PROCEDURE — 3008F BODY MASS INDEX DOCD: CPT | Mod: CPTII,S$GLB,, | Performed by: NURSE PRACTITIONER

## 2018-05-24 PROCEDURE — 99999 PR PBB SHADOW E&M-EST. PATIENT-LVL V: CPT | Mod: PBBFAC,,, | Performed by: NURSE PRACTITIONER

## 2018-05-24 PROCEDURE — 3045F PR MOST RECENT HEMOGLOBIN A1C LEVEL 7.0-9.0%: CPT | Mod: CPTII,S$GLB,, | Performed by: NURSE PRACTITIONER

## 2018-05-24 PROCEDURE — 95251 CONT GLUC MNTR ANALYSIS I&R: CPT | Mod: S$GLB,,, | Performed by: NURSE PRACTITIONER

## 2018-05-24 PROCEDURE — 99215 OFFICE O/P EST HI 40 MIN: CPT | Mod: 25,S$GLB,, | Performed by: NURSE PRACTITIONER

## 2018-05-24 PROCEDURE — 3078F DIAST BP <80 MM HG: CPT | Mod: CPTII,S$GLB,, | Performed by: NURSE PRACTITIONER

## 2018-05-24 PROCEDURE — 3074F SYST BP LT 130 MM HG: CPT | Mod: CPTII,S$GLB,, | Performed by: NURSE PRACTITIONER

## 2018-05-24 NOTE — PROGRESS NOTES
CC: Mr. Narciso Becerra arrives today for management of Type 1  DM and review of chronic medical conditions, as listed in the visit diagnosis section of this encounter.     HPI: Mr. Narciso eBcerra was diagnosed with Type 1  DM at age 18 after viral illness. Denies FH of DM. Denies hospitalizations due to DM.   Wears Dexcom G5.  He started insulin pump therapy with Omnipod in June 2015.    Last seen by me in October.     He has a habit of overtreating hypoglycemia and taking a correction bolus afterward.     BG readings are checked 6x/day.    Hypoglycemia: Rare but does report recent episodes after bolusing and getting distracted from eating.   Hypoglycemic Symptoms: dizziness, jitteriness and sweating  Hypoglycemia Treatment: soda.     Exercise: No formal    Dietary Habits: Eats 3 meals/day. Rare snacking. Avoids sugary beverages.     Last DM education appointment: 2015    Follows with Dr. Estrada every 3-6 months. Recent potassium elevated on Dr. Estrada's labs but normal on my ordered labs, on same date, same lab. Plans to have this checked again this week.     Has CAD, is s/p CABG in 2012 and follows with Dr. Cedeño.       CURRENT DIABETIC MEDS: Humalog in Omnipod insulin pump  Glucometer type: Freestyle strips    Name of Device or Devices used by the patient: Omnipod  When did you start the current therapy you are on: 6/2015  Frequency of changing site/infusion set/tubing/cartridges: 3 days  Frequency of changing CGM: 7 days  Using bolus wizard: yes  Taking bolus with each meal: yes    PUMP SETTINGS:  Basal:  0000 - 0.85 u/hr  0300 - 0.95 u/hr  0700 - 0.85 u/hr  1100 - 0.5 u/hr    I:C ratio:  0000 - 1:8    ISF:  45    Target:   0000 - 100-140    Active insulin time: 4 hours      Last Eye Exam: 2/2017. + DR. Atif Weeks and Dr. Bronson (retina specialist) annually . + cataract   Last Podiatry Exam: n/a    REVIEW OF SYSTEMS  Constitutional: no c/o fatigue, weakness, weight loss.   Eyes: c/o visual  "disturbances that he attributes to cataract.  Cardiac: no palpitations or chest pain.  Respiratory: no cough or dyspnea.  GI: no c/o abdominal pain or nausea  Skin: no lesions or rashes.  Neuro: + numbness, tingling in R hand. Reports dizziness that has improved since dose reduction of Ranexa.   Endocrine: denies polyphagia, polydipsia, polyuria      Personally reviewed Past Medical, Surgical, Social History.    Vital Signs  /62   Pulse 72   Ht 5' 10" (1.778 m)   Wt 81 kg (178 lb 9.6 oz)   BMI 25.63 kg/m²     Personally reviewed the below labs:    Hemoglobin A1C   Date Value Ref Range Status   05/16/2018 8.0 (H) 4.0 - 5.6 % Final     Comment:     According to ADA guidelines, hemoglobin A1c <7.0% represents  optimal control in non-pregnant diabetic patients. Different  metrics may apply to specific patient populations.   Standards of Medical Care in Diabetes-2016.  For the purpose of screening for the presence of diabetes:  <5.7%     Consistent with the absence of diabetes  5.7-6.4%  Consistent with increasing risk for diabetes   (prediabetes)  >or=6.5%  Consistent with diabetes  Currently, no consensus exists for use of hemoglobin A1c  for diagnosis of diabetes for children.  This Hemoglobin A1c assay has significant interference with fetal   hemoglobin   (HbF). The results are invalid for patients with abnormal amounts of   HbF,   including those with known Hereditary Persistence   of Fetal Hemoglobin. Heterozygous hemoglobin variants (HbAS, HbAC,   HbAD, HbAE, HbA2) do not significantly interfere with this assay;   however, presence of multiple variants in a sample may impact the %   interference.     01/04/2018 8.3 (H) 4.0 - 5.6 % Final     Comment:     According to ADA guidelines, hemoglobin A1c <7.0% represents  optimal control in non-pregnant diabetic patients. Different  metrics may apply to specific patient populations.   Standards of Medical Care in Diabetes-2016.  For the purpose of screening for " the presence of diabetes:  <5.7%     Consistent with the absence of diabetes  5.7-6.4%  Consistent with increasing risk for diabetes   (prediabetes)  >or=6.5%  Consistent with diabetes  Currently, no consensus exists for use of hemoglobin A1c  for diagnosis of diabetes for children.  This Hemoglobin A1c assay has significant interference with fetal   hemoglobin   (HbF). The results are invalid for patients with abnormal amounts of   HbF,   including those with known Hereditary Persistence   of Fetal Hemoglobin. Heterozygous hemoglobin variants (HbAS, HbAC,   HbAD, HbAE, HbA2) do not significantly interfere with this assay;   however, presence of multiple variants in a sample may impact the %   interference.     09/25/2017 8.2 (H) 4.0 - 5.6 % Final     Comment:     According to ADA guidelines, hemoglobin A1c <7.0% represents  optimal control in non-pregnant diabetic patients. Different  metrics may apply to specific patient populations.   Standards of Medical Care in Diabetes-2016.  For the purpose of screening for the presence of diabetes:  <5.7%     Consistent with the absence of diabetes  5.7-6.4%  Consistent with increasing risk for diabetes   (prediabetes)  >or=6.5%  Consistent with diabetes  Currently, no consensus exists for use of hemoglobin A1c  for diagnosis of diabetes for children.  This Hemoglobin A1c assay has significant interference with fetal   hemoglobin   (HbF). The results are invalid for patients with abnormal amounts of   HbF,   including those with known Hereditary Persistence   of Fetal Hemoglobin. Heterozygous hemoglobin variants (HbAS, HbAC,   HbAD, HbAE, HbA2) do not significantly interfere with this assay;   however, presence of multiple variants in a sample may impact the %   interference.         Chemistry        Component Value Date/Time     05/16/2018 0841     05/16/2018 0841    K 4.9 05/16/2018 0841    K 5.7 (H) 05/16/2018 0841     05/16/2018 0841      05/16/2018 0841    CO2 23 05/16/2018 0841    CO2 27 05/16/2018 0841    BUN 18 05/16/2018 0841    BUN 18 05/16/2018 0841    CREATININE 1.4 05/16/2018 0841    CREATININE 1.3 05/16/2018 0841     (H) 05/16/2018 0841     (H) 05/16/2018 0841        Component Value Date/Time    CALCIUM 9.7 05/16/2018 0841    CALCIUM 10.0 05/16/2018 0841    ALKPHOS 61 05/16/2018 0841    AST 22 05/16/2018 0841    ALT 21 05/16/2018 0841    BILITOT 0.5 05/16/2018 0841          Lab Results   Component Value Date    CHOL 170 05/16/2018    CHOL 169 09/25/2017    CHOL 156 10/18/2016     Lab Results   Component Value Date    HDL 64 05/16/2018    HDL 65 09/25/2017    HDL 62 10/18/2016     Lab Results   Component Value Date    LDLCALC 82.6 05/16/2018    LDLCALC 84.4 09/25/2017    LDLCALC 67.4 10/18/2016     Lab Results   Component Value Date    TRIG 117 05/16/2018    TRIG 98 09/25/2017    TRIG 133 10/18/2016     Lab Results   Component Value Date    CHOLHDL 37.6 05/16/2018    CHOLHDL 38.5 09/25/2017    CHOLHDL 39.7 10/18/2016       Lab Results   Component Value Date    MICALBCREAT 8.4 08/24/2015     Lab Results   Component Value Date    TSH 5.106 (H) 01/04/2018       CrCl cannot be calculated (Patient's most recent lab result is older than the maximum 7 days allowed.).    No results found for: [id number]      PHYSICAL EXAMINATION  Constitutional: Appears well, no distress  Neck: Supple, trachea midline; no thyromegaly or nodules.   Respiratory: CTA, even and unlabored.  Cardiovascular: RRR, no murmurs, no carotid bruits. DP pulses  2+ bilaterally; no edema.    Lymph: no cervical or supraclavicular lymphadenopathy  Skin: warm and dry; no lipohypertrophy, or acanthosis nigracans observed.  Neuro: DTR 1+ BUE/1+BLE. Previously, no loss of protective sensation via 10 gm monofilament. Vibratory exam decreased bilaterally  Feet: appropriate footwear.    PUMP DOWNLOAD: see media file for details. Most glucoses acceptable. Sporadic  hyperglycemia noted without any true pattern. Hypoglycemia noted after taking bolus for more carbs than consumed. He then overtreated this, resulting in hyperglycemia. Correction bolus taken a few hours later, causing another hypo episode. Uses bolus wizard regularly.   7 day averages ---   Average TDD - 33.2 u  Average bolus -  15.9 units  Average basal -  17.3 units        DEXCOM DOWNLOAD: See media file for details. Mostly acceptable. Episode of hypoglycemia, as noted above. Most glucoses within range with occasional prandial excursion.    Average glucose: 156    A1c goal 7-8%       Assessment/Plan  1. Type 1 diabetes mellitus with diabetic polyneuropathy     Hypoglycemia due to Type 1 diabetes mellitus    -- Complex.  A1c at top of individualized target. However, glucoses recently improving. Hypoglycemia occurred after taking bolus and not eating the intended carbs.  Discussed avoiding this habit. Overtreating hypoglycemia.   -- continue same pump settings.   -- intensive BG monitoring 6x/day  -- back up plan: Lantus 16 units daily, Humalog per carb counting  -- schedule eye exam    -- Discussed diagnosis of DM, A1c goals, progression of disease, long term complications and tx options.  Advised patient to check BG before activities, such as driving or exercise.  -- Reviewed hypoglycemia management: treat with 1/2 glass of juice, 1/2 can regular coke, or 4 glucose tablets. Monitor and repeat treatment every 15 minutes until BG is >70 Then have a snack, which includes a complex carbohydrate and protein.   2. Type 1 diabetes mellitus with retinopathy, macular edema presence unspecified, unspecified laterality, unspecified retinopathy severity  -- keep follow ups   3. Type 1 diabetes mellitus with stage 3 chronic kidney disease  -- avoid insulin stacking, hypoglycemia  -- follows with nephrology   4. Coronary artery disease due to calcified coronary lesion  -- avoid hypoglycemia    5. Hypertension associated with  diabetes  -- controlled, on ace-i   6. Hyperlipidemia due to type 1 diabetes mellitus  -- controlled  -- continue statin   7. Insulin pump adjustment  -- download reviewed and changes made     Total Time and Counselin minutes, >50% time spent counseling as noted above in #1 A/P.       FOLLOW UP  Follow-up in about 3 months (around 2018).   Patient instructed to bring BG logs to each follow up   Patient encouraged to call for any BG/medication issues, concerns, or questions.    Orders Placed This Encounter   Procedures    Hemoglobin A1c

## 2018-05-24 NOTE — PATIENT INSTRUCTIONS
Hypoglycemia (Low Blood Sugar)     Fast-acting sugar includes a cup of nonfat milk.     Too little sugar (glucose) in your blood is called hypoglycemia or low blood sugar. Low blood sugar usually means anything lower than 70 mg/dL. Talk with your healthcare provider about your target range and what level is too low for you. Diabetes itself doesnt cause low blood sugar. But some of the treatments for diabetes, such as pills or insulin, may raise your risk for it. Low blood sugar may cause you to pass out or have a seizure. So always treat low blood sugar right away, but don't overeat.  Special note: Always carry a source of fast-acting sugar and a snack in case of hypoglycemia.   What you may notice  If you have low blood sugar, you may have one or more of these symptoms:  · Shakiness or dizziness  · Cold, clammy skin or sweating  · Feelings of hunger  · Headache  · Nervousness  · A hard, fast heartbeat  · Weakness  · Confusion or irritability  · Blurred vision  · Having nightmares or waking up confused or sweating  · Numbness or tingling in the lips or tongue  What you should do  Here are tips to follow if you have hypoglycemia:   · First check your blood sugar. If it is too low (out of your target range), eat or drink 15 to 20 grams of fast-acting sugar. This may be 3 to 4 glucose tablets, 4 ounces (half a cup) of fruit juice or regular (nondiet) soda, 8 ounces (1 cup) of fat-free milk, or 1 tablespoon of honey. Dont take more than this, or your blood sugar may go too high.  · Wait 15 minutes. Then recheck your blood sugar if you can.  · If your blood sugar is still too low, repeat the steps above and check your blood sugar again. If your blood sugar still has not returned to your target range, contact your healthcare provider or seek emergency care.  · Once your blood sugar returns to target range, eat a snack or meal.  Preventing low blood sugar  Things you can do include the following:   · If your condition  needs a strict treatment plan, eat your meals and snacks at the same times each day. Dont skip meals!  · If your treatment plan lets you change when you eat and what you eat, learn how to change the time and dose of your rapid-acting insulin to match this.   · Ask your healthcare provider if it is safe for you to drink alcohol. Never drink on an empty stomach.  · Take your medicine at the prescribed times.  · Always carry a source of fast-acting sugar and a snack when youre away from home.  Other things to do  Additional tips include the following:  · Carry a medical ID card, a compact USB drive, or wear a medical alert bracelet or necklace. It should say that you have diabetes. It should also say what to do if you pass out or have a seizure.  · Make sure your family, friends, and coworkers know the signs of low blood sugar. Tell them what to do if your blood sugar falls very low and you cant treat yourself.  · Keep a glucagon emergency kit handy. Be sure your family, friends, and coworkers know how and when to use it. Check it regularly and replace the glucagon before it expires.  · Talk with your health care team about other things you can do to prevent low blood sugar.     If you have unexplained hypoglycemia or hypoglycemia several times, call your healthcare provider.   Date Last Reviewed: 5/1/2016  © 8190-5214 [employer]. [address] [place] [address], [place] [address]. All rights reserved. This information is not intended as a substitute for professional medical care. Always follow your healthcare professional's instructions.

## 2018-05-31 ENCOUNTER — LAB VISIT (OUTPATIENT)
Dept: LAB | Facility: HOSPITAL | Age: 63
End: 2018-05-31
Attending: INTERNAL MEDICINE
Payer: COMMERCIAL

## 2018-05-31 DIAGNOSIS — N40.0 BENIGN ENLARGEMENT OF PROSTATE: ICD-10-CM

## 2018-05-31 DIAGNOSIS — R80.9 PROTEINURIA: ICD-10-CM

## 2018-05-31 DIAGNOSIS — E11.9 DIABETES MELLITUS WITHOUT COMPLICATION: ICD-10-CM

## 2018-05-31 DIAGNOSIS — N18.9 ANEMIA OF CHRONIC RENAL FAILURE: Primary | ICD-10-CM

## 2018-05-31 DIAGNOSIS — I12.9 HYPERTENSION, RENAL: ICD-10-CM

## 2018-05-31 DIAGNOSIS — D63.1 ANEMIA OF CHRONIC RENAL FAILURE: Primary | ICD-10-CM

## 2018-05-31 DIAGNOSIS — N18.2 CHRONIC KIDNEY DISEASE, STAGE II (MILD): ICD-10-CM

## 2018-05-31 LAB
ANION GAP SERPL CALC-SCNC: 7 MMOL/L
BUN SERPL-MCNC: 16 MG/DL
CALCIUM SERPL-MCNC: 9.7 MG/DL
CHLORIDE SERPL-SCNC: 101 MMOL/L
CO2 SERPL-SCNC: 26 MMOL/L
CREAT SERPL-MCNC: 1.4 MG/DL
EST. GFR  (AFRICAN AMERICAN): >60 ML/MIN/1.73 M^2
EST. GFR  (NON AFRICAN AMERICAN): 52.7 ML/MIN/1.73 M^2
GLUCOSE SERPL-MCNC: 279 MG/DL
POTASSIUM SERPL-SCNC: 4.8 MMOL/L
SODIUM SERPL-SCNC: 134 MMOL/L

## 2018-05-31 PROCEDURE — 36415 COLL VENOUS BLD VENIPUNCTURE: CPT | Mod: PO

## 2018-05-31 PROCEDURE — 80048 BASIC METABOLIC PNL TOTAL CA: CPT

## 2018-06-04 ENCOUNTER — OFFICE VISIT (OUTPATIENT)
Dept: FAMILY MEDICINE | Facility: CLINIC | Age: 63
End: 2018-06-04
Payer: COMMERCIAL

## 2018-06-04 VITALS
TEMPERATURE: 99 F | DIASTOLIC BLOOD PRESSURE: 54 MMHG | WEIGHT: 174.19 LBS | BODY MASS INDEX: 24.94 KG/M2 | SYSTOLIC BLOOD PRESSURE: 106 MMHG | HEART RATE: 79 BPM | HEIGHT: 70 IN

## 2018-06-04 DIAGNOSIS — I95.2 HYPOTENSION DUE TO DRUGS: Primary | ICD-10-CM

## 2018-06-04 PROCEDURE — 3078F DIAST BP <80 MM HG: CPT | Mod: CPTII,S$GLB,, | Performed by: FAMILY MEDICINE

## 2018-06-04 PROCEDURE — 3008F BODY MASS INDEX DOCD: CPT | Mod: CPTII,S$GLB,, | Performed by: FAMILY MEDICINE

## 2018-06-04 PROCEDURE — 99214 OFFICE O/P EST MOD 30 MIN: CPT | Mod: S$GLB,,, | Performed by: FAMILY MEDICINE

## 2018-06-04 PROCEDURE — 3074F SYST BP LT 130 MM HG: CPT | Mod: CPTII,S$GLB,, | Performed by: FAMILY MEDICINE

## 2018-06-04 PROCEDURE — 99999 PR PBB SHADOW E&M-EST. PATIENT-LVL III: CPT | Mod: PBBFAC,,, | Performed by: FAMILY MEDICINE

## 2018-06-04 NOTE — PATIENT INSTRUCTIONS
Advance Medical Directive    An advance medical directive is a form that lets you plan ahead for the care youd want if you could no longer express your wishes. This statement outlines the medical treatment youd want or names the person youd wish to make healthcare decisions for you. Be aware that laws vary from state to state, and it may be worthwhile to talk with an .  Writing down your wishes  · An advance directive is important whether youre young or old. Injury or illness can strike at any age.  · Decide what is important to you and the kind of treatment youd want, or not want to have.  · Some states allow only one kind of advance directive. Some let you do both a Durable Power of  for Health Care and a Living Will. Some states put both kinds on the same form.  A Durable Power of  for Health Care  · This form lets you name someone else to be your agent.  · This person can decide on treatment for you only when you cant speak for yourself.  · You do not need to be at the end of your life. He or she could speak for you if you were in a coma but were likely to recover.  A Living Will  · This form lets you list the care you want at the end of your life.  · A living will applies only if you wont live without medical treatment. It would apply if you had advanced cancer, a massive stroke, or other serious illness from which you will not recover.  · It takes effect only when you can no longer express your wishes yourself.  Date Last Reviewed: 2/13/2016  © 9898-2789 [employer]. [address] [place] [address], [place] [address]. All rights reserved. This information is not intended as a substitute for professional medical care. Always follow your healthcare professional's instructions.        Choosing an Agent    A durable power of  for health care is only as good as the person you name to be your agent. If this person knows your treatment wishes and is willing to carry them  out, youll probably be well-represented. Be sure to tell your agent whats important to you.  Who to choose  Here are suggestions for choosing an agent:  · You can name a family member, close friend, , , or rabbi.  · You should name one person as your agent. Then name one or two alternates. You need a backup person in case your first choice cant be reached when needed.  · Talk to each person you are thinking of naming as your agent or alternate. Do this before you decide who should carry out your wishes.  Your agent should be...  · A competent adult, 18 years or older.  · Someone you trust and can talk to about the care you want and what is important to you.  · Someone who supports your treatment choices.  In many states, your agent cant be...  · Your healthcare provider.  · An employee of your healthcare provider or of a hospital, nursing home, or hospice program where you receive care.  Some states list other restrictions about who can be named as an agent for an advance directive.  Tip: It's a good idea to write down your wishes and give a copy to your agent.   Date Last Reviewed: 2/14/2016  © 4325-4375 [employer]. [address] [place] [address], [place] [address]. All rights reserved. This information is not intended as a substitute for professional medical care. Always follow your healthcare professional's instructions.        Life Support    If you understand how specific treatments may affect your quality of life, you can decide which ones youd choose or refuse. You may want to talk to your healthcare provider about the possible benefits and risks of treatments. The chance of good results from these therapies varies based on each individual clinical situation and can be very difficult to predict. Medical treatment, if your life is in danger, falls into three main categories.  Life supporting  · This care keeps your heart and lungs going when they can no longer work on their  own.  · CPR restarts your heart and lungs if they stop working.  · A respirator (or ventilator) keeps you breathing. Air is pumped into your lungs through a tube thats put into your windpipe.  Life sustaining  · This care keeps you alive longer when you have an illness that cant be cured.  · Tube feeding or TPN (total parenteral nutrition) provides food and fluids through a tube or IV. It is given if you cant chew or swallow on your own.  · Dialysis is a kidney machine that cleans your blood when your kidneys can no longer work on their own.  Life enhancing  · This care controls pain and discomfort, such as nausea or difficulty breathing. This type of care is not designed to prolong your life, but to enhance quality of life. Nothing is done to keep you alive longer.  · Hospice care is comfort care. It might provide food and fluids by mouth or help with bathing. Hospice care is given during the last stages of a terminal illness.  · Strong pain medicine can be given to help keep you comfortable.  Do Not Resuscitate  Would you want CPR if your heart stops while youre a patient in a hospital or nursing home? If not, talk to your healthcare provider about issuing a DNR (Do-Not-Resuscitate) order.  Be aware  DNRs and advance directives may not apply during anesthesia, in emergency rooms, or when emergency medical teams respond to a 911 call. Ask your healthcare provider how you can make sure your wishes will be followed. Also, a DNR will not prevent you from getting other kinds of needed medical care such as treatment for pain, or bleeding.   Date Last Reviewed: 2/26/2016 © 2000-2017 [employer]. [address] [place] [address], [place] [address]. All rights reserved. This information is not intended as a substitute for professional medical care. Always follow your healthcare professional's instructions.        An Agents Role for Durable Power of     Its impossible to know which medical treatment  choices you might face in the future. What if you aren't able to make these decisions for yourself? A durable power of  for health care lets you name an agent to carry out your wishes. This happens only if you cant express your wishes yourself.  An agents duty  Your agent respects your wishes in the following ways:   · Your agents duty is to see that your wishes are followed.  · If your wishes arent known, your agent should try to decide what you want.  · Your agents choices come before anyone elses wishes for you.  · A durable power of  for health care does not give your agent control over your money. Your agent also cant be made to pay your bills.  Find out what your agent can do  Restrictions on what an agent can and cant do vary by state. Check your state laws. In most states your agent can:  · Choose or refuse life-sustaining and other medical treatment on your behalf.  · Consent to and then stop treatment if your condition doesnt improve.  · Access and release your medical records.  · Request an autopsy and donate your organs, unless youve stated otherwise on your advance directive.  Find out whether your state allows your agent to do the following:  · Refuse or withdraw life-enhancing care.  · Refuse or stop tube feeding or other life-sustaining care--even if you havent stated on your advance directive that you dont want these treatments.  · Order sterilization or .  Date Last Reviewed: 2016  © 8471-2826 [employer]. [address] [place] [address], [place] [address]. All rights reserved. This information is not intended as a substitute for professional medical care. Always follow your healthcare professional's instructions.

## 2018-06-06 ENCOUNTER — LAB VISIT (OUTPATIENT)
Dept: LAB | Facility: HOSPITAL | Age: 63
End: 2018-06-06
Attending: FAMILY MEDICINE
Payer: COMMERCIAL

## 2018-06-06 DIAGNOSIS — I95.2 HYPOTENSION DUE TO DRUGS: ICD-10-CM

## 2018-06-06 LAB — CORTIS SERPL-MCNC: 12.9 UG/DL

## 2018-06-06 PROCEDURE — 82533 TOTAL CORTISOL: CPT

## 2018-06-06 PROCEDURE — 36415 COLL VENOUS BLD VENIPUNCTURE: CPT | Mod: PO

## 2018-06-09 NOTE — PROGRESS NOTES
Subjective:       Patient ID: Narciso Becerra is a 62 y.o. male.    Chief Complaint: Dizziness    Dizziness:   Chronicity:  Recurrent  Onset:  More than 1 month ago  Progression since onset:  Waxing and waning  Frequency:  Hourly  Pain Scale:  0/10  Severity:  Mild  Dizziness characteristics:  Giddiness and lightheaded/impending faint   Associated symptoms: weakness.no ear pain, no headaches, no light-headedness, no palpitations and no chest pain.  Aggravated by:  Getting up  Risk factors:  Other   PMH includes: strokes and cardiac surgery.    Review of Systems   Constitutional: Negative for chills, fatigue and unexpected weight change.   HENT: Positive for postnasal drip. Negative for ear discharge, ear pain and sore throat.    Respiratory: Negative for cough, chest tightness and shortness of breath.    Cardiovascular: Negative for chest pain, palpitations and leg swelling.   Gastrointestinal: Negative for abdominal pain.   Musculoskeletal: Negative for arthralgias.   Neurological: Positive for weakness and numbness. Negative for dizziness, syncope, light-headedness and headaches.       Patient Active Problem List   Diagnosis    CKD (chronic kidney disease) stage 3, GFR 30-59 ml/min    Coronary artery disease    Type 1 diabetes mellitus with diabetic polyneuropathy    Peripheral neuropathy    Insulin pump fitting or adjustment    Hypertension associated with diabetes    Hyperlipidemia due to type 1 diabetes mellitus    Angina of effort    Encounter for long-term (current) use of insulin    Adult situational stress disorder    Type 1 diabetes mellitus with retinopathy    Type 1 diabetes mellitus with stage 3 chronic kidney disease       Objective:      Physical Exam   Constitutional: He is oriented to person, place, and time. He appears well-developed and well-nourished.   Non orthostatics, but there is anxiety.   Cardiovascular: Normal rate, regular rhythm and normal heart sounds.     Pulmonary/Chest: Effort normal and breath sounds normal.   Musculoskeletal: He exhibits no edema.   Neurological: He is alert and oriented to person, place, and time.   Neurological exam reveals alert, oriented, normal speech, no focal findings or movement disorder noted, screening mental status exam normal, neck supple without rigidity, cranial nerves II through XII intact, funduscopic exam normal, discs flat and sharp, DTR's normal and symmetric, Babinski sign negative, motor and sensory grossly normal bilaterally, normal muscle tone, no tremors, strength 5/5, Romberg sign negative, normal gait and station.     Skin: Skin is warm and dry.   Psychiatric: His behavior is normal. Thought content normal. His mood appears anxious. His affect is not labile. His speech is rapid and/or pressured. Cognition and memory are impaired. He does not exhibit a depressed mood.   Nursing note and vitals reviewed.      Lab Results   Component Value Date    WBC 5.58 05/16/2018    HGB 12.1 (L) 05/16/2018    HCT 35.1 (L) 05/16/2018     05/16/2018    CHOL 170 05/16/2018    TRIG 117 05/16/2018    HDL 64 05/16/2018    ALT 21 05/16/2018    AST 22 05/16/2018     (L) 05/31/2018    K 4.8 05/31/2018     05/31/2018    CREATININE 1.4 05/31/2018    BUN 16 05/31/2018    CO2 26 05/31/2018    TSH 5.106 (H) 01/04/2018    PSA 0.46 06/15/2017    HGBA1C 8.0 (H) 05/16/2018     The 10-year ASCVD risk score (Fox JOSHUA Jr., et al., 2013) is: 14.2%    Values used to calculate the score:      Age: 62 years      Sex: Male      Is Non- : No      Diabetic: Yes      Tobacco smoker: No      Systolic Blood Pressure: 106 mmHg      Is BP treated: Yes      HDL Cholesterol: 64 mg/dL      Total Cholesterol: 170 mg/dL    Assessment:       1. Hypotension due to drugs        Plan:       Hypotension due to drugs  -     Cortisol; Future; Expected date: 06/04/2018      Patient readiness: acceptance and barriers:none    During the  course of the visit the patient was educated and counseled about the following:     Diabetes:  Discussed general issues about diabetes pathophysiology and management.  Addressed ADA diet.  Suggested low cholesterol diet.  Encouraged aerobic exercise.  Hypertension:   Medication: no change.  Dietary sodium restriction.  Regular aerobic exercise.  Check blood pressures 3 times weekly and record.    Goals: Diabetes: Maintain Hemoglobin A1C below 7 and Hypertension: Reduce Blood Pressure    Did patient meet goals/outcomes: Yes    The following self management tools provided: blood pressure log  blood glucose log  excercise log    Patient Instructions (the written plan) was given to the patient/family.     Time spent with patient: 45 minutes    Barriers to medications present (yes )    Adverse reactions to current medications (yes)    Over the counter medications reviewed (Yes)        30-minute visit. 20 minutes spent counseling patient on diet, exercise, and weight loss.  This has been fully explained to the patient, who indicates understanding.

## 2018-07-14 ENCOUNTER — HOSPITAL ENCOUNTER (OUTPATIENT)
Dept: RADIOLOGY | Facility: HOSPITAL | Age: 63
Discharge: HOME | End: 2018-07-14
Attending: PAIN MEDICINE
Payer: COMMERCIAL

## 2018-07-14 DIAGNOSIS — M54.12 RADICULOPATHY, CERVICAL REGION: ICD-10-CM

## 2018-07-14 PROCEDURE — 72141 MRI NECK SPINE W/O DYE: CPT | Mod: 26,,, | Performed by: RADIOLOGY

## 2018-07-14 PROCEDURE — 72141 MRI NECK SPINE W/O DYE: CPT | Mod: TC,PO

## 2018-07-28 ENCOUNTER — HOSPITAL ENCOUNTER (OUTPATIENT)
Dept: RADIOLOGY | Facility: HOSPITAL | Age: 63
Discharge: HOME | End: 2018-07-28
Attending: PAIN MEDICINE
Payer: COMMERCIAL

## 2018-07-28 DIAGNOSIS — M54.12 CERVICAL RADICULOPATHY: ICD-10-CM

## 2018-07-28 PROCEDURE — 72141 MRI NECK SPINE W/O DYE: CPT | Mod: TC,PO

## 2018-07-28 PROCEDURE — 72141 MRI NECK SPINE W/O DYE: CPT | Mod: 26,,, | Performed by: RADIOLOGY

## 2018-08-13 ENCOUNTER — LAB VISIT (OUTPATIENT)
Dept: LAB | Facility: HOSPITAL | Age: 63
End: 2018-08-13
Attending: NURSE PRACTITIONER
Payer: COMMERCIAL

## 2018-08-13 DIAGNOSIS — E10.42 TYPE 1 DIABETES MELLITUS WITH DIABETIC POLYNEUROPATHY: ICD-10-CM

## 2018-08-13 LAB
ESTIMATED AVG GLUCOSE: 183 MG/DL
HBA1C MFR BLD HPLC: 8 %

## 2018-08-13 PROCEDURE — 36415 COLL VENOUS BLD VENIPUNCTURE: CPT | Mod: PO

## 2018-08-13 PROCEDURE — 83036 HEMOGLOBIN GLYCOSYLATED A1C: CPT

## 2018-08-16 ENCOUNTER — OFFICE VISIT (OUTPATIENT)
Dept: ENDOCRINOLOGY | Facility: CLINIC | Age: 63
End: 2018-08-16
Payer: COMMERCIAL

## 2018-08-16 VITALS
HEIGHT: 70 IN | SYSTOLIC BLOOD PRESSURE: 106 MMHG | WEIGHT: 174.63 LBS | RESPIRATION RATE: 18 BRPM | HEART RATE: 80 BPM | DIASTOLIC BLOOD PRESSURE: 60 MMHG | BODY MASS INDEX: 25 KG/M2

## 2018-08-16 DIAGNOSIS — E10.22 TYPE 1 DIABETES MELLITUS WITH STAGE 3 CHRONIC KIDNEY DISEASE: ICD-10-CM

## 2018-08-16 DIAGNOSIS — N18.30 TYPE 1 DIABETES MELLITUS WITH STAGE 3 CHRONIC KIDNEY DISEASE: ICD-10-CM

## 2018-08-16 DIAGNOSIS — E11.59 HYPERTENSION ASSOCIATED WITH DIABETES: ICD-10-CM

## 2018-08-16 DIAGNOSIS — E10.319 TYPE 1 DIABETES MELLITUS WITH RETINOPATHY, MACULAR EDEMA PRESENCE UNSPECIFIED, UNSPECIFIED LATERALITY, UNSPECIFIED RETINOPATHY SEVERITY: ICD-10-CM

## 2018-08-16 DIAGNOSIS — E78.5 HYPERLIPIDEMIA DUE TO TYPE 1 DIABETES MELLITUS: ICD-10-CM

## 2018-08-16 DIAGNOSIS — Z46.81 INSULIN PUMP FITTING OR ADJUSTMENT: ICD-10-CM

## 2018-08-16 DIAGNOSIS — E10.42 TYPE 1 DIABETES MELLITUS WITH DIABETIC POLYNEUROPATHY: Primary | ICD-10-CM

## 2018-08-16 DIAGNOSIS — I15.2 HYPERTENSION ASSOCIATED WITH DIABETES: ICD-10-CM

## 2018-08-16 DIAGNOSIS — E10.69 HYPERLIPIDEMIA DUE TO TYPE 1 DIABETES MELLITUS: ICD-10-CM

## 2018-08-16 DIAGNOSIS — I25.84 CORONARY ARTERY DISEASE DUE TO CALCIFIED CORONARY LESION: ICD-10-CM

## 2018-08-16 DIAGNOSIS — E03.8 SUBCLINICAL HYPOTHYROIDISM: ICD-10-CM

## 2018-08-16 DIAGNOSIS — I25.10 CORONARY ARTERY DISEASE DUE TO CALCIFIED CORONARY LESION: ICD-10-CM

## 2018-08-16 PROCEDURE — 3078F DIAST BP <80 MM HG: CPT | Mod: CPTII,S$GLB,, | Performed by: NURSE PRACTITIONER

## 2018-08-16 PROCEDURE — 3074F SYST BP LT 130 MM HG: CPT | Mod: CPTII,S$GLB,, | Performed by: NURSE PRACTITIONER

## 2018-08-16 PROCEDURE — 99999 PR PBB SHADOW E&M-EST. PATIENT-LVL V: CPT | Mod: PBBFAC,,, | Performed by: NURSE PRACTITIONER

## 2018-08-16 PROCEDURE — 95251 CONT GLUC MNTR ANALYSIS I&R: CPT | Mod: S$GLB,,, | Performed by: NURSE PRACTITIONER

## 2018-08-16 PROCEDURE — 3008F BODY MASS INDEX DOCD: CPT | Mod: CPTII,S$GLB,, | Performed by: NURSE PRACTITIONER

## 2018-08-16 PROCEDURE — 3045F PR MOST RECENT HEMOGLOBIN A1C LEVEL 7.0-9.0%: CPT | Mod: CPTII,S$GLB,, | Performed by: NURSE PRACTITIONER

## 2018-08-16 PROCEDURE — 99215 OFFICE O/P EST HI 40 MIN: CPT | Mod: S$GLB,,, | Performed by: NURSE PRACTITIONER

## 2018-08-16 RX ORDER — GABAPENTIN 600 MG/1
1800 TABLET, FILM COATED ORAL NIGHTLY
COMMUNITY
Start: 2018-08-13 | End: 2019-07-11

## 2018-08-16 RX ORDER — INSULIN LISPRO 100 [IU]/ML
INJECTION, SOLUTION INTRAVENOUS; SUBCUTANEOUS
COMMUNITY
Start: 2018-07-30 | End: 2018-12-19 | Stop reason: SDUPTHER

## 2018-08-16 RX ORDER — INSULIN ASPART 100 [IU]/ML
INJECTION, SOLUTION INTRAVENOUS; SUBCUTANEOUS
Qty: 1 BOX | Refills: 6
Start: 2018-08-16 | End: 2018-12-06 | Stop reason: CLARIF

## 2018-08-16 NOTE — PROGRESS NOTES
CC: Mr. Narciso Becerra arrives today for management of Type 1  DM and review of chronic medical conditions, as listed in the visit diagnosis section of this encounter.     HPI: Mr. Narciso Becerra was diagnosed with Type 1  DM at age 18 after viral illness. Denies FH of DM. Denies hospitalizations due to DM.   Wears Dexcom G5.  He started insulin pump therapy with Omnipod in June 2015.  Has CAD, is s/p CABG in 2012 and follows with cardiology (Dr. Cedeño).  Follows with Dr. Estrada every 3-6 months for CKD.     Last seen by me in May.     Has had pain recently with pinched nerve in neck. Had epidural with steroid on Tuesday. Has had higher glucoses since then.     BG readings are checked 6x/day.    Hypoglycemia: Rare   Hypoglycemic Symptoms: dizziness, jitteriness and sweating  Hypoglycemia Treatment: soda.     Exercise: No formal    Dietary Habits: Eats 3 meals/day. Dinner may be late (10:00). Rare snacking. Avoids sugary beverages.     Last DM education appointment: 2015      CURRENT DIABETIC MEDS: Humalog in Omnipod insulin pump  Glucometer type: Freestyle strips  Insulin back up plan: Lantus 16 units daily, Humalog per carb counting    Name of Device or Devices used by the patient: Omnipod  When did you start the current therapy you are on: 6/2015  Frequency of changing site/infusion set/tubing/cartridges: 3 days  Frequency of changing CGM: 7 days  Using bolus wizard: yes  Taking bolus with each meal: yes    PUMP SETTINGS:  Basal:  0000 - 0.85 u/hr  0300 - 0.95 u/hr  0700 - 0.85 u/hr  1100 - 0.5 u/hr    I:C ratio:  0000 - 1:8    ISF:  45    Target:   0000 - 100-140    Active insulin time: 4 hours      Last Eye Exam: 11/2017. + DR. Atif Weeks and Dr. Smith (retina specialist) annually . + cataract   Last Podiatry Exam: n/a    REVIEW OF SYSTEMS  Constitutional: no c/o fatigue, weakness, weight loss.   Eyes: c/o visual disturbances that he attributes to cataract.  Cardiac: no palpitations or  "chest pain.  Respiratory: no cough or dyspnea.  GI: no c/o abdominal pain or nausea  Skin: no lesions or rashes.  Neuro: denies numbness, tingling, paresthesias to feet  Musculoskeletal: + right sided neck pain that radiates to shoulder. This is causing right arm numbness when turning head to R side. Recent improvement with epidural.   Endocrine: denies polyphagia, polydipsia, polyuria      Personally reviewed Past Medical, Surgical, Social History.    Vital Signs  /60   Pulse 80   Resp 18   Ht 5' 10" (1.778 m)   Wt 79.2 kg (174 lb 9.7 oz)   BMI 25.05 kg/m²     Personally reviewed the below labs:    Hemoglobin A1C   Date Value Ref Range Status   08/13/2018 8.0 (H) 4.0 - 5.6 % Final     Comment:     ADA Screening Guidelines:  5.7-6.4%  Consistent with prediabetes  >or=6.5%  Consistent with diabetes  High levels of fetal hemoglobin interfere with the HbA1C  assay. Heterozygous hemoglobin variants (HbS, HgC, etc)do  not significantly interfere with this assay.   However, presence of multiple variants may affect accuracy.     05/16/2018 8.0 (H) 4.0 - 5.6 % Final     Comment:     According to ADA guidelines, hemoglobin A1c <7.0% represents  optimal control in non-pregnant diabetic patients. Different  metrics may apply to specific patient populations.   Standards of Medical Care in Diabetes-2016.  For the purpose of screening for the presence of diabetes:  <5.7%     Consistent with the absence of diabetes  5.7-6.4%  Consistent with increasing risk for diabetes   (prediabetes)  >or=6.5%  Consistent with diabetes  Currently, no consensus exists for use of hemoglobin A1c  for diagnosis of diabetes for children.  This Hemoglobin A1c assay has significant interference with fetal   hemoglobin   (HbF). The results are invalid for patients with abnormal amounts of   HbF,   including those with known Hereditary Persistence   of Fetal Hemoglobin. Heterozygous hemoglobin variants (HbAS, HbAC,   HbAD, HbAE, HbA2) do not " significantly interfere with this assay;   however, presence of multiple variants in a sample may impact the %   interference.     01/04/2018 8.3 (H) 4.0 - 5.6 % Final     Comment:     According to ADA guidelines, hemoglobin A1c <7.0% represents  optimal control in non-pregnant diabetic patients. Different  metrics may apply to specific patient populations.   Standards of Medical Care in Diabetes-2016.  For the purpose of screening for the presence of diabetes:  <5.7%     Consistent with the absence of diabetes  5.7-6.4%  Consistent with increasing risk for diabetes   (prediabetes)  >or=6.5%  Consistent with diabetes  Currently, no consensus exists for use of hemoglobin A1c  for diagnosis of diabetes for children.  This Hemoglobin A1c assay has significant interference with fetal   hemoglobin   (HbF). The results are invalid for patients with abnormal amounts of   HbF,   including those with known Hereditary Persistence   of Fetal Hemoglobin. Heterozygous hemoglobin variants (HbAS, HbAC,   HbAD, HbAE, HbA2) do not significantly interfere with this assay;   however, presence of multiple variants in a sample may impact the %   interference.         Chemistry        Component Value Date/Time     (L) 05/31/2018 0944    K 4.8 05/31/2018 0944     05/31/2018 0944    CO2 26 05/31/2018 0944    BUN 16 05/31/2018 0944    CREATININE 1.4 05/31/2018 0944     (H) 05/31/2018 0944        Component Value Date/Time    CALCIUM 9.7 05/31/2018 0944    ALKPHOS 61 05/16/2018 0841    AST 22 05/16/2018 0841    ALT 21 05/16/2018 0841    BILITOT 0.5 05/16/2018 0841          Lab Results   Component Value Date    CHOL 170 05/16/2018    CHOL 169 09/25/2017    CHOL 156 10/18/2016     Lab Results   Component Value Date    HDL 64 05/16/2018    HDL 65 09/25/2017    HDL 62 10/18/2016     Lab Results   Component Value Date    LDLCALC 82.6 05/16/2018    LDLCALC 84.4 09/25/2017    LDLCALC 67.4 10/18/2016     Lab Results   Component  Value Date    TRIG 117 05/16/2018    TRIG 98 09/25/2017    TRIG 133 10/18/2016     Lab Results   Component Value Date    CHOLHDL 37.6 05/16/2018    CHOLHDL 38.5 09/25/2017    CHOLHDL 39.7 10/18/2016       Lab Results   Component Value Date    MICALBCREAT 8.4 08/24/2015     Lab Results   Component Value Date    TSH 5.106 (H) 01/04/2018       CrCl cannot be calculated (Patient's most recent lab result is older than the maximum 7 days allowed.).    No results found for: [id number]      PHYSICAL EXAMINATION  Constitutional: Appears well, no distress  Neck: Supple, trachea midline; no thyromegaly or nodules.   Respiratory: CTA, even and unlabored.  Cardiovascular: RRR, no murmurs, no carotid bruits. DP pulses  2+ bilaterally; no edema.    Lymph: no cervical or supraclavicular lymphadenopathy  Skin: warm and dry; no lipohypertrophy, or acanthosis nigracans observed.  Neuro: DTR 1+ BUE/1+BLE. Previously, no loss of protective sensation via 10 gm monofilament. Vibratory exam decreased bilaterally  Feet: appropriate footwear.      PUMP DOWNLOAD: see media file for details. Hyperglycemia noted since Tuesday (steroid). Fasting glucoses are elevated, mostly in 200 range. No hypoglycemia. Some pre-dinner readings are acceptable.  Uses bolus wizard regularly.   Average TDD - 38.2u  Basal: 42% (15.9 u)  Bolus: 58% (22.3 u)        DEXCOM DOWNLOAD: See media file for details. Hyperglycemia noted since Tuesday (steroid). Most glucoses elevated. AM glucoses tend to trend up. Prandial excursions noted. Occasionally glucoses trend down late afternoon. No hypoglycemia.   Average glucose: 208  % above goal: 70%  % within goal: 30%  % below goal: 0%    A1c goal 7-8%       Assessment/Plan  1. Type 1 diabetes mellitus with diabetic polyneuropathy        -- Uncontrolled with mostly elevated readings. No hypoglycemia. + prandial excursions and AM hyperglycemia.   -- change basal rates as follows:     0000 - 0.95 u/hr     1200 - 0.5 u/hr  --  change I:C ratio to 1:7  -- change ISF to 40   -- BG monitoring 6x/day (pt preference even though he uses Dexcom)    -- Discussed diagnosis of DM, A1c goals, progression of disease, long term complications and tx options.  Advised patient to check BG before activities, such as driving or exercise.  -- Reviewed hypoglycemia management: treat with 1/2 glass of juice, 1/2 can regular coke, or 4 glucose tablets. Monitor and repeat treatment every 15 minutes until BG is >70 Then have a snack, which includes a complex carbohydrate and protein.   2. Type 1 diabetes mellitus with retinopathy, macular edema presence unspecified, unspecified laterality, unspecified retinopathy severity  -- keep follow ups   3. Type 1 diabetes mellitus with stage 3 chronic kidney disease  -- avoid insulin stacking, hypoglycemia  -- follows with nephrology   4. Coronary artery disease due to calcified coronary lesion  -- avoid hypoglycemia  -- follows with cardiology    5. Hypertension associated with diabetes  -- controlled  -- defer med mgt to cardiology or nephrology   6. Hyperlipidemia due to type 1 diabetes mellitus  -- controlled  -- continue statin   7. Insulin pump adjustment  -- download reviewed and changes made   8. Subclinical hypothyroidism -- repeat TSH with return     Total Time and Counselin minutes, >50% time spent counseling as noted above in #1 A/P.       FOLLOW UP  Follow-up in about 3 months (around 2018).   Patient instructed to bring BG logs to each follow up   Patient encouraged to call for any BG/medication issues, concerns, or questions.    Orders Placed This Encounter   Procedures    Hemoglobin A1c    Comprehensive metabolic panel    TSH

## 2018-08-16 NOTE — PATIENT INSTRUCTIONS
Basal rates:  Midnight: 0.95 u/hr  12:00 PM: 0.5 u/hr    Insulin:carb ratio:  1:7    Insulin sensitivity factor:   40    Target: 100-140    Active insulin time: 4 hours

## 2018-09-05 ENCOUNTER — TELEPHONE (OUTPATIENT)
Dept: ENDOCRINOLOGY | Facility: CLINIC | Age: 63
End: 2018-09-05

## 2018-09-05 NOTE — TELEPHONE ENCOUNTER
----- Message from Nixon Bran sent at 9/5/2018 10:37 AM CDT -----  Contact: Tri/[facility] [employer]  Tri called in and stated she faxed over some paperwork on 8/30/18 for patient to get a new Tech in [place] & wanted to check status.  Tri's call back number is [phone], ext [phone]

## 2018-11-09 ENCOUNTER — LAB VISIT (OUTPATIENT)
Dept: LAB | Facility: HOSPITAL | Age: 63
End: 2018-11-09
Attending: INTERNAL MEDICINE
Payer: COMMERCIAL

## 2018-11-09 DIAGNOSIS — N18.9 ANEMIA OF CHRONIC RENAL FAILURE: Primary | ICD-10-CM

## 2018-11-09 DIAGNOSIS — N18.2 CHRONIC KIDNEY DISEASE, STAGE II (MILD): ICD-10-CM

## 2018-11-09 DIAGNOSIS — N40.0 BENIGN ENLARGEMENT OF PROSTATE: ICD-10-CM

## 2018-11-09 DIAGNOSIS — R80.9 PROTEINURIA: ICD-10-CM

## 2018-11-09 DIAGNOSIS — E11.9 DIABETES MELLITUS WITHOUT COMPLICATION: ICD-10-CM

## 2018-11-09 DIAGNOSIS — I12.9 PARENCHYMAL RENAL HYPERTENSION: ICD-10-CM

## 2018-11-09 DIAGNOSIS — D63.1 ANEMIA OF CHRONIC RENAL FAILURE: Primary | ICD-10-CM

## 2018-11-09 LAB
ALBUMIN SERPL BCP-MCNC: 4.1 G/DL
ALP SERPL-CCNC: 77 U/L
ALT SERPL W/O P-5'-P-CCNC: 25 U/L
ANION GAP SERPL CALC-SCNC: 7 MMOL/L
AST SERPL-CCNC: 25 U/L
BASOPHILS # BLD AUTO: 0.03 K/UL
BASOPHILS NFR BLD: 0.6 %
BILIRUB SERPL-MCNC: 0.9 MG/DL
BUN SERPL-MCNC: 16 MG/DL
CALCIUM SERPL-MCNC: 9.7 MG/DL
CHLORIDE SERPL-SCNC: 98 MMOL/L
CO2 SERPL-SCNC: 29 MMOL/L
CREAT SERPL-MCNC: 1.4 MG/DL
CREAT UR-MCNC: 48 MG/DL
DIFFERENTIAL METHOD: ABNORMAL
EOSINOPHIL # BLD AUTO: 0.3 K/UL
EOSINOPHIL NFR BLD: 5.1 %
ERYTHROCYTE [DISTWIDTH] IN BLOOD BY AUTOMATED COUNT: 12.3 %
EST. GFR  (AFRICAN AMERICAN): >60 ML/MIN/1.73 M^2
EST. GFR  (NON AFRICAN AMERICAN): 52.4 ML/MIN/1.73 M^2
FERRITIN SERPL-MCNC: 48 NG/ML
GLUCOSE SERPL-MCNC: 109 MG/DL
HCT VFR BLD AUTO: 36.8 %
HGB BLD-MCNC: 12.4 G/DL
IMM GRANULOCYTES # BLD AUTO: 0.01 K/UL
IMM GRANULOCYTES NFR BLD AUTO: 0.2 %
IRON SERPL-MCNC: 124 UG/DL
LYMPHOCYTES # BLD AUTO: 1.6 K/UL
LYMPHOCYTES NFR BLD: 30.8 %
MCH RBC QN AUTO: 31.9 PG
MCHC RBC AUTO-ENTMCNC: 33.7 G/DL
MCV RBC AUTO: 95 FL
MONOCYTES # BLD AUTO: 0.5 K/UL
MONOCYTES NFR BLD: 9.5 %
NEUTROPHILS # BLD AUTO: 2.7 K/UL
NEUTROPHILS NFR BLD: 53.8 %
NRBC BLD-RTO: 0 /100 WBC
PHOSPHATE SERPL-MCNC: 3.5 MG/DL
PLATELET # BLD AUTO: 268 K/UL
PMV BLD AUTO: 10.2 FL
POTASSIUM SERPL-SCNC: 4.7 MMOL/L
PROT SERPL-MCNC: 7.4 G/DL
PROT UR-MCNC: <7 MG/DL
PROT/CREAT UR: NORMAL MG/G{CREAT}
RBC # BLD AUTO: 3.89 M/UL
SATURATED IRON: 28 %
SODIUM SERPL-SCNC: 134 MMOL/L
TOTAL IRON BINDING CAPACITY: 444 UG/DL
TRANSFERRIN SERPL-MCNC: 300 MG/DL
WBC # BLD AUTO: 5.07 K/UL

## 2018-11-09 PROCEDURE — 85025 COMPLETE CBC W/AUTO DIFF WBC: CPT

## 2018-11-09 PROCEDURE — 84100 ASSAY OF PHOSPHORUS: CPT

## 2018-11-09 PROCEDURE — 82728 ASSAY OF FERRITIN: CPT

## 2018-11-09 PROCEDURE — 36415 COLL VENOUS BLD VENIPUNCTURE: CPT | Mod: PO

## 2018-11-09 PROCEDURE — 84156 ASSAY OF PROTEIN URINE: CPT

## 2018-11-09 PROCEDURE — 83540 ASSAY OF IRON: CPT

## 2018-11-09 PROCEDURE — 80053 COMPREHEN METABOLIC PANEL: CPT

## 2018-11-29 ENCOUNTER — PATIENT MESSAGE (OUTPATIENT)
Dept: ENDOCRINOLOGY | Facility: CLINIC | Age: 63
End: 2018-11-29

## 2018-11-29 ENCOUNTER — LAB VISIT (OUTPATIENT)
Dept: LAB | Facility: HOSPITAL | Age: 63
End: 2018-11-29
Attending: NURSE PRACTITIONER
Payer: COMMERCIAL

## 2018-11-29 DIAGNOSIS — E10.42 TYPE 1 DIABETES MELLITUS WITH DIABETIC POLYNEUROPATHY: ICD-10-CM

## 2018-11-29 LAB
ALBUMIN SERPL BCP-MCNC: 3.8 G/DL
ALP SERPL-CCNC: 78 U/L
ALT SERPL W/O P-5'-P-CCNC: 18 U/L
ANION GAP SERPL CALC-SCNC: 8 MMOL/L
AST SERPL-CCNC: 21 U/L
BILIRUB SERPL-MCNC: 0.5 MG/DL
BUN SERPL-MCNC: 20 MG/DL
CALCIUM SERPL-MCNC: 9.7 MG/DL
CHLORIDE SERPL-SCNC: 103 MMOL/L
CO2 SERPL-SCNC: 26 MMOL/L
CREAT SERPL-MCNC: 1.4 MG/DL
EST. GFR  (AFRICAN AMERICAN): >60 ML/MIN/1.73 M^2
EST. GFR  (NON AFRICAN AMERICAN): 52.4 ML/MIN/1.73 M^2
ESTIMATED AVG GLUCOSE: 194 MG/DL
GLUCOSE SERPL-MCNC: 339 MG/DL
HBA1C MFR BLD HPLC: 8.4 %
POTASSIUM SERPL-SCNC: 4.5 MMOL/L
PROT SERPL-MCNC: 7.2 G/DL
SODIUM SERPL-SCNC: 137 MMOL/L
TSH SERPL DL<=0.005 MIU/L-ACNC: 3.66 UIU/ML

## 2018-11-29 PROCEDURE — 80053 COMPREHEN METABOLIC PANEL: CPT

## 2018-11-29 PROCEDURE — 83036 HEMOGLOBIN GLYCOSYLATED A1C: CPT

## 2018-11-29 PROCEDURE — 36415 COLL VENOUS BLD VENIPUNCTURE: CPT | Mod: PO

## 2018-11-29 PROCEDURE — 84443 ASSAY THYROID STIM HORMONE: CPT

## 2018-12-06 ENCOUNTER — OFFICE VISIT (OUTPATIENT)
Dept: ENDOCRINOLOGY | Facility: CLINIC | Age: 63
End: 2018-12-06
Payer: COMMERCIAL

## 2018-12-06 ENCOUNTER — TELEPHONE (OUTPATIENT)
Dept: ENDOCRINOLOGY | Facility: CLINIC | Age: 63
End: 2018-12-06

## 2018-12-06 VITALS
HEART RATE: 71 BPM | WEIGHT: 182 LBS | SYSTOLIC BLOOD PRESSURE: 112 MMHG | BODY MASS INDEX: 26.05 KG/M2 | HEIGHT: 70 IN | DIASTOLIC BLOOD PRESSURE: 60 MMHG

## 2018-12-06 DIAGNOSIS — I25.84 CORONARY ARTERY DISEASE DUE TO CALCIFIED CORONARY LESION: ICD-10-CM

## 2018-12-06 DIAGNOSIS — I25.10 CORONARY ARTERY DISEASE DUE TO CALCIFIED CORONARY LESION: ICD-10-CM

## 2018-12-06 DIAGNOSIS — E10.42 TYPE 1 DIABETES MELLITUS WITH DIABETIC POLYNEUROPATHY: ICD-10-CM

## 2018-12-06 DIAGNOSIS — E10.69 HYPERLIPIDEMIA DUE TO TYPE 1 DIABETES MELLITUS: ICD-10-CM

## 2018-12-06 DIAGNOSIS — E78.5 HYPERLIPIDEMIA DUE TO TYPE 1 DIABETES MELLITUS: ICD-10-CM

## 2018-12-06 DIAGNOSIS — E10.22 TYPE 1 DIABETES MELLITUS WITH STAGE 3 CHRONIC KIDNEY DISEASE: ICD-10-CM

## 2018-12-06 DIAGNOSIS — Z46.81 INSULIN PUMP FITTING OR ADJUSTMENT: ICD-10-CM

## 2018-12-06 DIAGNOSIS — E10.42 TYPE 1 DIABETES MELLITUS WITH DIABETIC POLYNEUROPATHY: Primary | ICD-10-CM

## 2018-12-06 DIAGNOSIS — E11.59 HYPERTENSION ASSOCIATED WITH DIABETES: ICD-10-CM

## 2018-12-06 DIAGNOSIS — E10.319 TYPE 1 DIABETES MELLITUS WITH RETINOPATHY, MACULAR EDEMA PRESENCE UNSPECIFIED, UNSPECIFIED LATERALITY, UNSPECIFIED RETINOPATHY SEVERITY: ICD-10-CM

## 2018-12-06 DIAGNOSIS — N18.30 TYPE 1 DIABETES MELLITUS WITH STAGE 3 CHRONIC KIDNEY DISEASE: ICD-10-CM

## 2018-12-06 DIAGNOSIS — E03.8 SUBCLINICAL HYPOTHYROIDISM: ICD-10-CM

## 2018-12-06 DIAGNOSIS — I15.2 HYPERTENSION ASSOCIATED WITH DIABETES: ICD-10-CM

## 2018-12-06 PROCEDURE — 99215 OFFICE O/P EST HI 40 MIN: CPT | Mod: S$GLB,,, | Performed by: NURSE PRACTITIONER

## 2018-12-06 PROCEDURE — 3008F BODY MASS INDEX DOCD: CPT | Mod: CPTII,S$GLB,, | Performed by: NURSE PRACTITIONER

## 2018-12-06 PROCEDURE — 3074F SYST BP LT 130 MM HG: CPT | Mod: CPTII,S$GLB,, | Performed by: NURSE PRACTITIONER

## 2018-12-06 PROCEDURE — 1101F PT FALLS ASSESS-DOCD LE1/YR: CPT | Mod: CPTII,S$GLB,, | Performed by: NURSE PRACTITIONER

## 2018-12-06 PROCEDURE — 3045F PR MOST RECENT HEMOGLOBIN A1C LEVEL 7.0-9.0%: CPT | Mod: CPTII,S$GLB,, | Performed by: NURSE PRACTITIONER

## 2018-12-06 PROCEDURE — 99999 PR PBB SHADOW E&M-EST. PATIENT-LVL V: CPT | Mod: PBBFAC,,, | Performed by: NURSE PRACTITIONER

## 2018-12-06 PROCEDURE — 3078F DIAST BP <80 MM HG: CPT | Mod: CPTII,S$GLB,, | Performed by: NURSE PRACTITIONER

## 2018-12-06 RX ORDER — INSULIN LISPRO 100 [IU]/ML
INJECTION, SOLUTION INTRAVENOUS; SUBCUTANEOUS
COMMUNITY
End: 2019-03-28 | Stop reason: SDUPTHER

## 2018-12-06 RX ORDER — ATORVASTATIN CALCIUM 40 MG/1
40 TABLET, FILM COATED ORAL DAILY
COMMUNITY
End: 2020-06-29 | Stop reason: SDUPTHER

## 2018-12-06 RX ORDER — INSULIN GLARGINE 100 [IU]/ML
17 INJECTION, SOLUTION SUBCUTANEOUS NIGHTLY
Qty: 1 BOX | Refills: 6 | Status: SHIPPED | OUTPATIENT
Start: 2018-12-06 | End: 2021-01-28

## 2018-12-06 NOTE — PROGRESS NOTES
CC: Mr. Narciso Becerra arrives today for management of Type 1  DM and review of chronic medical conditions, as listed in the visit diagnosis section of this encounter.     HPI: Mr. Narciso Becerra was diagnosed with Type 1  DM at age 18 after viral illness. Denies FH of DM. Denies hospitalizations due to DM.   Wears Dexcom G5.  He started insulin pump therapy with Omnipod in June 2015.  Has CAD, is s/p CABG in 2012 and follows with cardiology (Dr. Cedeño).  Follows with Dr. Estrada every 3-6 months for CKD.     Last seen by me in August. At this time, MN basal rate was increased and I:C ratio and ISF were adjusted to allow for more insulin coverage.      He reports today that he is having cervical corpectomy and fibular strut fusion next Friday. He was told he will spend one night in the hospital.     He does report that he sometimes forgets to bolus and noticed BG would increase. This was an issue over the past few months but has recently improved.     BG readings are checked 6x/day.    Hypoglycemia: Rare   Hypoglycemic Symptoms: dizziness, jitteriness and sweating  Hypoglycemia Treatment: soda.     Exercise: No formal    Dietary Habits: Eats 3 meals/day. Breakfast is 2 glasses of milk only. Snacking on [employer] sweets at work. Avoids sugar sweetened beverages.     Last DM education appointment: 2015      CURRENT DIABETIC MEDS: Humalog in Omnipod insulin pump  Glucometer type: Freestyle strips  Insulin back up plan: Lantus 16 units daily, Humalog per carb counting    Name of Device or Devices used by the patient: Omnipod  When did you start the current therapy you are on: 6/2015  Frequency of changing site/infusion set/tubing/cartridges: 3 days  Frequency of changing CGM: 7 days  Using bolus wizard: yes  Taking bolus with each meal: yes    PUMP SETTINGS:  Basal:  0000 - 0.95 u/hr  1200 - 0.5 u/hr    I:C ratio:  0000 - 1:7    ISF:  40    Target:   0000 - 100-140    Active insulin time: 4 hours      Last  "Eye Exam: 10/2018. + DR. Atif Smith (retina specialist) annually . + cataract   Last Podiatry Exam: n/a    REVIEW OF SYSTEMS  Constitutional: no c/o fatigue, weakness, weight loss. + 8# weight gain.  Eyes: c/o visual disturbances that he attributes to cataract.  Cardiac: no palpitations or chest pain.  Respiratory: no cough or dyspnea.  GI: no c/o abdominal pain or nausea  Skin: no lesions or rashes.  Neuro: + slight numbness, tingling to BLE  Musculoskeletal: + right sided neck pain that radiates to shoulder. This is causing right arm numbness and muscle atrophy.   Endocrine: denies polyphagia, polydipsia, polyuria      Personally reviewed Past Medical, Surgical, Social History.    Vital Signs  /60   Pulse 71   Ht 5' 10" (1.778 m)   Wt 82.6 kg (182 lb)   BMI 26.11 kg/m²     Personally reviewed the below labs:    Hemoglobin A1C   Date Value Ref Range Status   11/29/2018 8.4 (H) 4.0 - 5.6 % Final     Comment:     ADA Screening Guidelines:  5.7-6.4%  Consistent with prediabetes  >or=6.5%  Consistent with diabetes  High levels of fetal hemoglobin interfere with the HbA1C  assay. Heterozygous hemoglobin variants (HbS, HgC, etc)do  not significantly interfere with this assay.   However, presence of multiple variants may affect accuracy.     08/13/2018 8.0 (H) 4.0 - 5.6 % Final     Comment:     ADA Screening Guidelines:  5.7-6.4%  Consistent with prediabetes  >or=6.5%  Consistent with diabetes  High levels of fetal hemoglobin interfere with the HbA1C  assay. Heterozygous hemoglobin variants (HbS, HgC, etc)do  not significantly interfere with this assay.   However, presence of multiple variants may affect accuracy.     05/16/2018 8.0 (H) 4.0 - 5.6 % Final     Comment:     According to ADA guidelines, hemoglobin A1c <7.0% represents  optimal control in non-pregnant diabetic patients. Different  metrics may apply to specific patient populations.   Standards of Medical Care in Diabetes-2016.  For the " purpose of screening for the presence of diabetes:  <5.7%     Consistent with the absence of diabetes  5.7-6.4%  Consistent with increasing risk for diabetes   (prediabetes)  >or=6.5%  Consistent with diabetes  Currently, no consensus exists for use of hemoglobin A1c  for diagnosis of diabetes for children.  This Hemoglobin A1c assay has significant interference with fetal   hemoglobin   (HbF). The results are invalid for patients with abnormal amounts of   HbF,   including those with known Hereditary Persistence   of Fetal Hemoglobin. Heterozygous hemoglobin variants (HbAS, HbAC,   HbAD, HbAE, HbA2) do not significantly interfere with this assay;   however, presence of multiple variants in a sample may impact the %   interference.         Chemistry        Component Value Date/Time     11/29/2018 0810    K 4.5 11/29/2018 0810     11/29/2018 0810    CO2 26 11/29/2018 0810    BUN 20 11/29/2018 0810    CREATININE 1.4 11/29/2018 0810     (H) 11/29/2018 0810        Component Value Date/Time    CALCIUM 9.7 11/29/2018 0810    ALKPHOS 78 11/29/2018 0810    AST 21 11/29/2018 0810    ALT 18 11/29/2018 0810    BILITOT 0.5 11/29/2018 0810          Lab Results   Component Value Date    CHOL 170 05/16/2018    CHOL 169 09/25/2017    CHOL 156 10/18/2016     Lab Results   Component Value Date    HDL 64 05/16/2018    HDL 65 09/25/2017    HDL 62 10/18/2016     Lab Results   Component Value Date    LDLCALC 82.6 05/16/2018    LDLCALC 84.4 09/25/2017    LDLCALC 67.4 10/18/2016     Lab Results   Component Value Date    TRIG 117 05/16/2018    TRIG 98 09/25/2017    TRIG 133 10/18/2016     Lab Results   Component Value Date    CHOLHDL 37.6 05/16/2018    CHOLHDL 38.5 09/25/2017    CHOLHDL 39.7 10/18/2016       Lab Results   Component Value Date    MICALBCREAT 8.4 08/24/2015     Lab Results   Component Value Date    TSH 3.659 11/29/2018       CrCl cannot be calculated (Patient's most recent lab result is older than the  maximum 7 days allowed.).    No results found for: [id number]      PHYSICAL EXAMINATION  Constitutional: Appears well, no distress  Neck: Supple, trachea midline; no thyromegaly or nodules.   Respiratory: CTA, even and unlabored.  Cardiovascular: RRR, no murmurs, no carotid bruits. DP pulses  2+ bilaterally; no edema.    Lymph: no cervical or supraclavicular lymphadenopathy  Skin: warm and dry; no lipohypertrophy, or acanthosis nigracans observed.  Neuro: DTR 1+ BUE/1+BLE. No loss of protective sensation via 10 gm monofilament. Vibratory exam decreased bilaterally  Feet: appropriate footwear. No open wounds or calluses.       PUMP DOWNLOAD: see media file for details. Occasional hyperglycemia noted - pattern is variable. Many glucoses within goal during the day. Occasional hypoglycemia in early AM or mid day sometimes following bolus. Uses bolus wizard regularly.   Average TDD - 37.8 u  Basal: 46% (17.2 u)  Bolus: 54% (20.6 u)        DEXCOM DOWNLOAD: See media file for details. Two recent episodes of hypoglycemia in the early morning. Occasional excursions after meals. Some prolonged excursions noted after dinner but not nightly.    Average glucose: 164 mg/dL  % above goal: 41%  % within goal: 58%  % below goal: 1%            A1c goal 7-8%       Assessment/Plan  1. Type 1 diabetes mellitus with diabetic polyneuropathy        -- Uncontrolled with elevated A1c but recent improvement in glucose patterns, per CGMS data. I suspect his noncompliance with diet and bolusing a few months ago caused his increased A1c.    -- change basal rates as follows:     0000 - 0.95 u/hr     0300 - 0.9 u/hr     0600 - 0.95 u/hr     1200 - 0.5 u/hr  -- BG monitoring 6x/day (pt preference even though he uses Dexcom)  -- Recommended upgrade to Dexcom G6. He plans to call [employer] to check eligibility.   -- OK to proceed with surgery. If pt has to come off pump, advised him to take Lantus 13 units (20% dose reduction) the night before  surgery. May use Humalog for correction, if necessary. He will need basal insulin the night he spends inpatient and will need Humalog coverage with meals (pt can dose according to his insulin:carb ratio). If he can stay on pump, he was instructed to use Temp Basal feature to reduce basal by 20%.     -- Discussed diagnosis of DM, A1c goals, progression of disease, long term complications and tx options.  Advised patient to check BG before activities, such as driving or exercise.  -- Reviewed hypoglycemia management: treat with 1/2 glass of juice, 1/2 can regular coke, or 4 glucose tablets. Monitor and repeat treatment every 15 minutes until BG is >70 Then have a snack, which includes a complex carbohydrate and protein.   2. Type 1 diabetes mellitus with retinopathy, macular edema presence unspecified, unspecified laterality, unspecified retinopathy severity  -- keep follow ups   3. Type 1 diabetes mellitus with stage 3 chronic kidney disease  -- avoid insulin stacking, hypoglycemia  -- follows with nephrology   4. Coronary artery disease due to calcified coronary lesion  -- avoid hypoglycemia  -- follows with cardiology    5. Hypertension associated with diabetes  -- controlled  -- defer med mgt to cardiology or nephrology   6. Hyperlipidemia due to type 1 diabetes mellitus  -- controlled  -- continue statin   7. Insulin pump adjustment  -- download reviewed and changes made   8. Subclinical hypothyroidism -- high normal range, will continue to monitor  -- patient is asymptomatic     Total Time and Counselin minutes, >50% time spent counseling as noted above in #1 A/P.       FOLLOW UP  Follow-up in about 3 months (around 3/6/2019).   Patient instructed to bring BG logs to each follow up   Patient encouraged to call for any BG/medication issues, concerns, or questions.    Orders Placed This Encounter   Procedures    Hemoglobin A1c    Basic metabolic panel    [facility] DIABETES FOOT EXAM

## 2018-12-06 NOTE — PATIENT INSTRUCTIONS
The night before surgery, use temp basal feature to decrease basal rate by 20%. Set duration for 12 hours and you can extend the time the next morning to carry you through surgery and recovery. You can cancel the temp basal setting once you are eating again.     If you have to come off of pump for surgery, remove your pump the night before and administer Lantus 13 units. You will need long acting insulin administered at night if spending the night in the hospital.     Basal:  0000 - 0.95 u/hr  0300 - 0.9 u/hr  0600 - 0.95 u/hr  1200 - 0.5 u/hr     I:C ratio:  0000 - 1:7     ISF:  40     Target:   0000 - 100-140     Active insulin time: 4 hours

## 2018-12-12 ENCOUNTER — LAB VISIT (OUTPATIENT)
Dept: LAB | Facility: HOSPITAL | Age: 63
End: 2018-12-12
Attending: INTERNAL MEDICINE
Payer: COMMERCIAL

## 2018-12-12 DIAGNOSIS — I25.10 CORONARY ATHEROSCLEROSIS OF NATIVE CORONARY ARTERY: Primary | ICD-10-CM

## 2018-12-12 DIAGNOSIS — I20.89 OTHER FORMS OF ANGINA PECTORIS: ICD-10-CM

## 2018-12-12 DIAGNOSIS — I45.2 BIFASCICULAR BLOCK: ICD-10-CM

## 2018-12-12 DIAGNOSIS — E78.2 MIXED HYPERLIPIDEMIA: ICD-10-CM

## 2018-12-12 DIAGNOSIS — Z95.1 POSTSURGICAL AORTOCORONARY BYPASS STATUS: ICD-10-CM

## 2018-12-12 DIAGNOSIS — E10.9 DIABETES MELLITUS TYPE I: ICD-10-CM

## 2018-12-12 LAB
CHOLEST SERPL-MCNC: 148 MG/DL
CHOLEST/HDLC SERPL: 2.7 {RATIO}
HDLC SERPL-MCNC: 55 MG/DL
HDLC SERPL: 37.2 %
LDLC SERPL CALC-MCNC: 60.6 MG/DL
NONHDLC SERPL-MCNC: 93 MG/DL
TRIGL SERPL-MCNC: 162 MG/DL

## 2018-12-12 PROCEDURE — 36415 COLL VENOUS BLD VENIPUNCTURE: CPT | Mod: PO

## 2018-12-12 PROCEDURE — 80061 LIPID PANEL: CPT

## 2018-12-19 RX ORDER — INSULIN LISPRO 100 [IU]/ML
INJECTION, SOLUTION INTRAVENOUS; SUBCUTANEOUS
Qty: 5 VIAL | Refills: 3 | Status: SHIPPED | OUTPATIENT
Start: 2018-12-19 | End: 2019-11-26 | Stop reason: SDUPTHER

## 2018-12-31 LAB
LEFT EYE DM RETINOPATHY: POSITIVE
RIGHT EYE DM RETINOPATHY: NEGATIVE

## 2019-01-18 ENCOUNTER — OFFICE VISIT (OUTPATIENT)
Dept: FAMILY MEDICINE | Facility: CLINIC | Age: 64
End: 2019-01-18
Payer: COMMERCIAL

## 2019-01-18 VITALS
TEMPERATURE: 98 F | HEIGHT: 70 IN | DIASTOLIC BLOOD PRESSURE: 69 MMHG | BODY MASS INDEX: 25.56 KG/M2 | SYSTOLIC BLOOD PRESSURE: 120 MMHG | HEART RATE: 82 BPM | WEIGHT: 178.56 LBS

## 2019-01-18 DIAGNOSIS — E78.5 HYPERLIPIDEMIA DUE TO TYPE 1 DIABETES MELLITUS: ICD-10-CM

## 2019-01-18 DIAGNOSIS — E10.69 HYPERLIPIDEMIA DUE TO TYPE 1 DIABETES MELLITUS: ICD-10-CM

## 2019-01-18 DIAGNOSIS — G60.3 IDIOPATHIC PROGRESSIVE NEUROPATHY: ICD-10-CM

## 2019-01-18 DIAGNOSIS — Z12.5 PROSTATE CANCER SCREENING: Primary | ICD-10-CM

## 2019-01-18 DIAGNOSIS — I15.2 HYPERTENSION ASSOCIATED WITH DIABETES: ICD-10-CM

## 2019-01-18 DIAGNOSIS — E11.59 HYPERTENSION ASSOCIATED WITH DIABETES: ICD-10-CM

## 2019-01-18 DIAGNOSIS — Z79.4 ENCOUNTER FOR LONG-TERM (CURRENT) USE OF INSULIN: ICD-10-CM

## 2019-01-18 DIAGNOSIS — N18.30 CKD (CHRONIC KIDNEY DISEASE) STAGE 3, GFR 30-59 ML/MIN: Chronic | ICD-10-CM

## 2019-01-18 DIAGNOSIS — E10.319 TYPE 1 DIABETES MELLITUS WITH RETINOPATHY, MACULAR EDEMA PRESENCE UNSPECIFIED, UNSPECIFIED LATERALITY, UNSPECIFIED RETINOPATHY SEVERITY: ICD-10-CM

## 2019-01-18 PROCEDURE — 99214 OFFICE O/P EST MOD 30 MIN: CPT | Mod: 25,S$GLB,, | Performed by: FAMILY MEDICINE

## 2019-01-18 PROCEDURE — 3078F DIAST BP <80 MM HG: CPT | Mod: CPTII,S$GLB,, | Performed by: FAMILY MEDICINE

## 2019-01-18 PROCEDURE — 3074F PR MOST RECENT SYSTOLIC BLOOD PRESSURE < 130 MM HG: ICD-10-PCS | Mod: CPTII,S$GLB,, | Performed by: FAMILY MEDICINE

## 2019-01-18 PROCEDURE — 3045F PR MOST RECENT HEMOGLOBIN A1C LEVEL 7.0-9.0%: ICD-10-PCS | Mod: CPTII,S$GLB,, | Performed by: FAMILY MEDICINE

## 2019-01-18 PROCEDURE — 90662 FLU VACCINE - HIGH DOSE (65+) PRESERVATIVE FREE IM: ICD-10-PCS | Mod: S$GLB,,, | Performed by: FAMILY MEDICINE

## 2019-01-18 PROCEDURE — 99999 PR PBB SHADOW E&M-EST. PATIENT-LVL III: ICD-10-PCS | Mod: PBBFAC,,, | Performed by: FAMILY MEDICINE

## 2019-01-18 PROCEDURE — 99999 PR PBB SHADOW E&M-EST. PATIENT-LVL III: CPT | Mod: PBBFAC,,, | Performed by: FAMILY MEDICINE

## 2019-01-18 PROCEDURE — 3074F SYST BP LT 130 MM HG: CPT | Mod: CPTII,S$GLB,, | Performed by: FAMILY MEDICINE

## 2019-01-18 PROCEDURE — 3078F PR MOST RECENT DIASTOLIC BLOOD PRESSURE < 80 MM HG: ICD-10-PCS | Mod: CPTII,S$GLB,, | Performed by: FAMILY MEDICINE

## 2019-01-18 PROCEDURE — 1101F PT FALLS ASSESS-DOCD LE1/YR: CPT | Mod: CPTII,S$GLB,, | Performed by: FAMILY MEDICINE

## 2019-01-18 PROCEDURE — 3008F PR BODY MASS INDEX (BMI) DOCUMENTED: ICD-10-PCS | Mod: CPTII,S$GLB,, | Performed by: FAMILY MEDICINE

## 2019-01-18 PROCEDURE — 3045F PR MOST RECENT HEMOGLOBIN A1C LEVEL 7.0-9.0%: CPT | Mod: CPTII,S$GLB,, | Performed by: FAMILY MEDICINE

## 2019-01-18 PROCEDURE — 3008F BODY MASS INDEX DOCD: CPT | Mod: CPTII,S$GLB,, | Performed by: FAMILY MEDICINE

## 2019-01-18 PROCEDURE — 99214 PR OFFICE/OUTPT VISIT, EST, LEVL IV, 30-39 MIN: ICD-10-PCS | Mod: 25,S$GLB,, | Performed by: FAMILY MEDICINE

## 2019-01-18 PROCEDURE — 90471 IMMUNIZATION ADMIN: CPT | Mod: S$GLB,,, | Performed by: FAMILY MEDICINE

## 2019-01-18 PROCEDURE — 90662 IIV NO PRSV INCREASED AG IM: CPT | Mod: S$GLB,,, | Performed by: FAMILY MEDICINE

## 2019-01-18 PROCEDURE — 1101F PR PT FALLS ASSESS DOC 0-1 FALLS W/OUT INJ PAST YR: ICD-10-PCS | Mod: CPTII,S$GLB,, | Performed by: FAMILY MEDICINE

## 2019-01-18 PROCEDURE — 90471 FLU VACCINE - HIGH DOSE (65+) PRESERVATIVE FREE IM: ICD-10-PCS | Mod: S$GLB,,, | Performed by: FAMILY MEDICINE

## 2019-01-18 RX ORDER — ICOSAPENT ETHYL 1000 MG/1
2 CAPSULE ORAL 2 TIMES DAILY
COMMUNITY
End: 2020-01-20

## 2019-01-18 RX ORDER — GABAPENTIN 600 MG/1
3 TABLET ORAL DAILY
COMMUNITY
End: 2019-07-11

## 2019-01-18 NOTE — PATIENT INSTRUCTIONS

## 2019-01-21 ENCOUNTER — LAB VISIT (OUTPATIENT)
Dept: LAB | Facility: HOSPITAL | Age: 64
End: 2019-01-21
Attending: FAMILY MEDICINE
Payer: COMMERCIAL

## 2019-01-21 DIAGNOSIS — Z12.5 PROSTATE CANCER SCREENING: ICD-10-CM

## 2019-01-21 LAB — COMPLEXED PSA SERPL-MCNC: 1.1 NG/ML

## 2019-01-21 PROCEDURE — 36415 COLL VENOUS BLD VENIPUNCTURE: CPT | Mod: PO

## 2019-01-21 PROCEDURE — 84153 ASSAY OF PSA TOTAL: CPT

## 2019-01-29 DIAGNOSIS — F43.20 ADULT SITUATIONAL STRESS DISORDER: Chronic | ICD-10-CM

## 2019-01-29 DIAGNOSIS — F41.1 GAD (GENERALIZED ANXIETY DISORDER): ICD-10-CM

## 2019-01-29 RX ORDER — ESCITALOPRAM OXALATE 10 MG/1
10 TABLET ORAL DAILY
Qty: 90 TABLET | Refills: 3 | Status: SHIPPED | OUTPATIENT
Start: 2019-01-29 | End: 2020-02-17 | Stop reason: SDUPTHER

## 2019-02-03 NOTE — PROGRESS NOTES
Subjective:       Patient ID: Narciso Becerra is a 63 y.o. male.    Chief Complaint: Annual Exam  HT has been controlled, no chest pains, no edema.red  DM I over 20 yrs, neuropathy, CKD III, and retinopathy.  He has been more active since he retired.    HPI  Review of Systems   Constitutional: Negative for fatigue and unexpected weight change.   Eyes: Positive for visual disturbance.   Respiratory: Negative for chest tightness and shortness of breath.    Cardiovascular: Negative for chest pain, palpitations and leg swelling.   Gastrointestinal: Negative for abdominal pain.   Genitourinary: Positive for frequency.   Musculoskeletal: Positive for myalgias. Negative for arthralgias.   Neurological: Positive for numbness. Negative for dizziness, syncope, light-headedness and headaches.       Patient Active Problem List   Diagnosis    CKD (chronic kidney disease) stage 3, GFR 30-59 ml/min    Coronary artery disease    Type 1 diabetes mellitus with diabetic polyneuropathy    Peripheral neuropathy    Insulin pump fitting or adjustment    Hypertension associated with diabetes    Hyperlipidemia due to type 1 diabetes mellitus    Angina of effort    Encounter for long-term (current) use of insulin    Adult situational stress disorder    Type 1 diabetes mellitus with retinopathy    Type 1 diabetes mellitus with stage 3 chronic kidney disease    Subclinical hypothyroidism       Objective:      Physical Exam   Constitutional: He is oriented to person, place, and time. He appears well-developed.   Cardiovascular: Normal rate, regular rhythm and normal heart sounds.   Pulses:       Dorsalis pedis pulses are 1+ on the right side, and 1+ on the left side.        Posterior tibial pulses are 1+ on the right side, and 1+ on the left side.   Pulmonary/Chest: Effort normal and breath sounds normal.   Musculoskeletal: He exhibits no edema.   Feet:   Right Foot:   Protective Sensation: 6 sites tested. 2 sites sensed.    Skin Integrity: Positive for callus.   Left Foot:   Protective Sensation: 6 sites tested. 2 sites sensed.   Skin Integrity: Positive for callus.   Neurological: He is alert and oriented to person, place, and time.   Skin: Skin is warm and dry.   Psychiatric: He has a normal mood and affect.   Nursing note and vitals reviewed.      Lab Results   Component Value Date    WBC 5.07 11/09/2018    HGB 12.4 (L) 11/09/2018    HCT 36.8 (L) 11/09/2018     11/09/2018    CHOL 148 12/12/2018    TRIG 162 (H) 12/12/2018    HDL 55 12/12/2018    ALT 18 11/29/2018    AST 21 11/29/2018     11/29/2018    K 4.5 11/29/2018     11/29/2018    CREATININE 1.4 11/29/2018    BUN 20 11/29/2018    CO2 26 11/29/2018    TSH 3.659 11/29/2018    PSA 0.46 06/15/2017    HGBA1C 8.4 (H) 11/29/2018     The 10-year ASCVD risk score (Yocasta JOSHUA Jr., et al., 2013) is: 18.8%    Values used to calculate the score:      Age: 63 years      Sex: Male      Is Non- : No      Diabetic: Yes      Tobacco smoker: No      Systolic Blood Pressure: 120 mmHg      Is BP treated: Yes      HDL Cholesterol: 55 mg/dL      Total Cholesterol: 148 mg/dL    Assessment:       1. Prostate cancer screening    2. Idiopathic progressive neuropathy    3. Type 1 diabetes mellitus with retinopathy, macular edema presence unspecified, unspecified laterality, unspecified retinopathy severity    4. Hyperlipidemia due to type 1 diabetes mellitus    5. CKD (chronic kidney disease) stage 3, GFR 30-59 ml/min    6. Hypertension associated with diabetes    7. Encounter for long-term (current) use of insulin        Plan:       Prostate cancer screening  -     PROSTATE SPECIFIC ANTIGEN, DIAGNOSTIC; Future; Expected date: 01/18/2019    Idiopathic progressive neuropathy    Type 1 diabetes mellitus with retinopathy, macular edema presence unspecified, unspecified laterality, unspecified retinopathy severity    Hyperlipidemia due to type 1 diabetes  mellitus    CKD (chronic kidney disease) stage 3, GFR 30-59 ml/min    Hypertension associated with diabetes    Encounter for long-term (current) use of insulin    Other orders  -     Influenza - High Dose (65+) (PF) (IM)      Patient readiness: acceptance and barriers:readiness and household issues    During the course of the visit the patient was educated and counseled about the following:     Diabetes:  Suggested low cholesterol diet.  Encouraged aerobic exercise.  Discussed foot care.  Reminded to get yearly retinal exam.  Discussed ways to avoid symptomatic hypoglycemia.  Reminded to bring in blood sugar diary at next visit.  Follow up in 4 month or as needed.  Hypertension:   Medication: no change.  Screening for causes of secondary hypertension: GFR (chronic kidney disease).  Dietary sodium restriction.  Check blood pressures daily and record.  Follow up: 4 months and as needed.  :   increase fiber diet.    Goals: Diabetes: Maintain Hemoglobin A1C below 7 and Hypertension: Reduce Blood Pressure    Did patient meet goals/outcomes: Yes    The following self management tools provided: blood glucose log    Patient Instructions (the written plan) was given to the patient/family.     Time spent with patient: 30 minutes    Barriers to medications present (no )    Adverse reactions to current medications (no)    Over the counter medications reviewed (Yes)        40-minute visit. 20 minutes spent counseling patient on diet, exercise, and weight loss.  This has been fully explained to the patient, who indicates understanding.

## 2019-03-20 ENCOUNTER — TELEPHONE (OUTPATIENT)
Dept: ENDOCRINOLOGY | Facility: CLINIC | Age: 64
End: 2019-03-20

## 2019-03-20 NOTE — TELEPHONE ENCOUNTER
This was ordered 3 months ago by cardiology. No need to repeat so soon. Only abnormality was slightly elevated triglycerides, which is likely related to the increase he had in a1c in December.

## 2019-03-20 NOTE — TELEPHONE ENCOUNTER
----- Message from Ariana Bland sent at 3/20/2019 11:37 AM CDT -----  Contact: pt  ..Type: Needs Medical Advice    Who Called: pt  Best Call Back Number:   Additional Information: Pt would like to speak with a nurse to get orders in to have his Cholesterol Checked. Pt would like for orders to be linked to labs scheduled for tomorrow.  Please call pt to advise.  Thanks

## 2019-03-21 ENCOUNTER — PATIENT MESSAGE (OUTPATIENT)
Dept: ENDOCRINOLOGY | Facility: CLINIC | Age: 64
End: 2019-03-21

## 2019-03-21 ENCOUNTER — LAB VISIT (OUTPATIENT)
Dept: LAB | Facility: HOSPITAL | Age: 64
End: 2019-03-21
Attending: NURSE PRACTITIONER
Payer: COMMERCIAL

## 2019-03-21 DIAGNOSIS — E10.42 TYPE 1 DIABETES MELLITUS WITH DIABETIC POLYNEUROPATHY: ICD-10-CM

## 2019-03-21 LAB
ANION GAP SERPL CALC-SCNC: 10 MMOL/L
BUN SERPL-MCNC: 18 MG/DL
CALCIUM SERPL-MCNC: 10 MG/DL
CHLORIDE SERPL-SCNC: 101 MMOL/L
CO2 SERPL-SCNC: 27 MMOL/L
CREAT SERPL-MCNC: 1.7 MG/DL
EST. GFR  (AFRICAN AMERICAN): 47.9 ML/MIN/1.73 M^2
EST. GFR  (NON AFRICAN AMERICAN): 41.4 ML/MIN/1.73 M^2
ESTIMATED AVG GLUCOSE: 180 MG/DL
GLUCOSE SERPL-MCNC: 242 MG/DL
HBA1C MFR BLD HPLC: 7.9 %
POTASSIUM SERPL-SCNC: 4.5 MMOL/L
SODIUM SERPL-SCNC: 138 MMOL/L

## 2019-03-21 PROCEDURE — 80048 BASIC METABOLIC PNL TOTAL CA: CPT

## 2019-03-21 PROCEDURE — 83036 HEMOGLOBIN GLYCOSYLATED A1C: CPT

## 2019-03-21 PROCEDURE — 36415 COLL VENOUS BLD VENIPUNCTURE: CPT | Mod: PO

## 2019-03-28 ENCOUNTER — OFFICE VISIT (OUTPATIENT)
Dept: ENDOCRINOLOGY | Facility: CLINIC | Age: 64
End: 2019-03-28
Payer: COMMERCIAL

## 2019-03-28 VITALS
WEIGHT: 176.5 LBS | RESPIRATION RATE: 18 BRPM | HEIGHT: 70 IN | HEART RATE: 72 BPM | BODY MASS INDEX: 25.27 KG/M2 | DIASTOLIC BLOOD PRESSURE: 50 MMHG | SYSTOLIC BLOOD PRESSURE: 90 MMHG

## 2019-03-28 DIAGNOSIS — I15.2 HYPERTENSION ASSOCIATED WITH DIABETES: ICD-10-CM

## 2019-03-28 DIAGNOSIS — I25.84 CORONARY ARTERY DISEASE DUE TO CALCIFIED CORONARY LESION: ICD-10-CM

## 2019-03-28 DIAGNOSIS — E78.5 HYPERLIPIDEMIA DUE TO TYPE 1 DIABETES MELLITUS: ICD-10-CM

## 2019-03-28 DIAGNOSIS — E10.319 TYPE 1 DIABETES MELLITUS WITH RETINOPATHY, MACULAR EDEMA PRESENCE UNSPECIFIED, UNSPECIFIED LATERALITY, UNSPECIFIED RETINOPATHY SEVERITY: ICD-10-CM

## 2019-03-28 DIAGNOSIS — N18.30 TYPE 1 DIABETES MELLITUS WITH STAGE 3 CHRONIC KIDNEY DISEASE: ICD-10-CM

## 2019-03-28 DIAGNOSIS — E03.8 SUBCLINICAL HYPOTHYROIDISM: ICD-10-CM

## 2019-03-28 DIAGNOSIS — E11.59 HYPERTENSION ASSOCIATED WITH DIABETES: ICD-10-CM

## 2019-03-28 DIAGNOSIS — E10.42 TYPE 1 DIABETES MELLITUS WITH DIABETIC POLYNEUROPATHY: Primary | ICD-10-CM

## 2019-03-28 DIAGNOSIS — E10.69 HYPERLIPIDEMIA DUE TO TYPE 1 DIABETES MELLITUS: ICD-10-CM

## 2019-03-28 DIAGNOSIS — Z46.81 INSULIN PUMP FITTING OR ADJUSTMENT: ICD-10-CM

## 2019-03-28 DIAGNOSIS — I25.10 CORONARY ARTERY DISEASE DUE TO CALCIFIED CORONARY LESION: ICD-10-CM

## 2019-03-28 DIAGNOSIS — E10.22 TYPE 1 DIABETES MELLITUS WITH STAGE 3 CHRONIC KIDNEY DISEASE: ICD-10-CM

## 2019-03-28 PROCEDURE — 99999 PR PBB SHADOW E&M-EST. PATIENT-LVL V: CPT | Mod: PBBFAC,,, | Performed by: NURSE PRACTITIONER

## 2019-03-28 PROCEDURE — 3074F SYST BP LT 130 MM HG: CPT | Mod: CPTII,S$GLB,, | Performed by: NURSE PRACTITIONER

## 2019-03-28 PROCEDURE — 99215 OFFICE O/P EST HI 40 MIN: CPT | Mod: S$GLB,,, | Performed by: NURSE PRACTITIONER

## 2019-03-28 PROCEDURE — 3078F DIAST BP <80 MM HG: CPT | Mod: CPTII,S$GLB,, | Performed by: NURSE PRACTITIONER

## 2019-03-28 PROCEDURE — 1101F PR PT FALLS ASSESS DOC 0-1 FALLS W/OUT INJ PAST YR: ICD-10-PCS | Mod: CPTII,S$GLB,, | Performed by: NURSE PRACTITIONER

## 2019-03-28 PROCEDURE — 3008F BODY MASS INDEX DOCD: CPT | Mod: CPTII,S$GLB,, | Performed by: NURSE PRACTITIONER

## 2019-03-28 PROCEDURE — 3078F PR MOST RECENT DIASTOLIC BLOOD PRESSURE < 80 MM HG: ICD-10-PCS | Mod: CPTII,S$GLB,, | Performed by: NURSE PRACTITIONER

## 2019-03-28 PROCEDURE — 95251 PR GLUCOSE MONITOR, 72 HOUR, PHYS INTERP: ICD-10-PCS | Mod: S$GLB,,, | Performed by: NURSE PRACTITIONER

## 2019-03-28 PROCEDURE — 3008F PR BODY MASS INDEX (BMI) DOCUMENTED: ICD-10-PCS | Mod: CPTII,S$GLB,, | Performed by: NURSE PRACTITIONER

## 2019-03-28 PROCEDURE — 95251 CONT GLUC MNTR ANALYSIS I&R: CPT | Mod: S$GLB,,, | Performed by: NURSE PRACTITIONER

## 2019-03-28 PROCEDURE — 3045F PR MOST RECENT HEMOGLOBIN A1C LEVEL 7.0-9.0%: CPT | Mod: CPTII,S$GLB,, | Performed by: NURSE PRACTITIONER

## 2019-03-28 PROCEDURE — 99215 PR OFFICE/OUTPT VISIT, EST, LEVL V, 40-54 MIN: ICD-10-PCS | Mod: S$GLB,,, | Performed by: NURSE PRACTITIONER

## 2019-03-28 PROCEDURE — 99999 PR PBB SHADOW E&M-EST. PATIENT-LVL V: ICD-10-PCS | Mod: PBBFAC,,, | Performed by: NURSE PRACTITIONER

## 2019-03-28 PROCEDURE — 3045F PR MOST RECENT HEMOGLOBIN A1C LEVEL 7.0-9.0%: ICD-10-PCS | Mod: CPTII,S$GLB,, | Performed by: NURSE PRACTITIONER

## 2019-03-28 PROCEDURE — 3074F PR MOST RECENT SYSTOLIC BLOOD PRESSURE < 130 MM HG: ICD-10-PCS | Mod: CPTII,S$GLB,, | Performed by: NURSE PRACTITIONER

## 2019-03-28 PROCEDURE — 1101F PT FALLS ASSESS-DOCD LE1/YR: CPT | Mod: CPTII,S$GLB,, | Performed by: NURSE PRACTITIONER

## 2019-03-28 RX ORDER — INSULIN LISPRO 100 [IU]/ML
INJECTION, SOLUTION INTRAVENOUS; SUBCUTANEOUS
Qty: 15 ML | Refills: 2 | Status: SHIPPED | OUTPATIENT
Start: 2019-03-28 | End: 2019-05-25 | Stop reason: SDUPTHER

## 2019-03-28 NOTE — PROGRESS NOTES
"CC: Mr. Narciso Becerra arrives today for management of Type 1  DM and review of chronic medical conditions, as listed in the visit diagnosis section of this encounter.     HPI: Mr. Narciso Becerra was diagnosed with Type 1  DM at age 18 after viral illness. Denies FH of DM. Denies hospitalizations due to DM.   Wears Dexcom G5.  He started insulin pump therapy with Omnipod in June 2015.  Has CAD, is s/p CABG in 2012 and follows with cardiology (Dr. Cedeño).  Follows with Dr. Estrada every 3-6 months for CKD.     Last seen by me in December. At this time, basal rates were adjusted.     He notices that BG stays elevated for about 12 hours after changing pod.     BG readings are checked 6x/day.    Hypoglycemia: Rare   Hypoglycemic Symptoms: dizziness, jitteriness and sweating  Hypoglycemia Treatment: Raisinetts candy, [place] punch.     Exercise: No formal but did play golf yesterday    Dietary Habits: Eats 3 meals/day. Breakfast is lightest meal - may only be toast. Has glass of milk with breakfast.     Last DM education appointment: 2015    BP is toward lower end of normal today but pt denies that this is a common pattern. Pt has been taking Ranexa "for years." He does state that he hasn't had much water to drink today. Currently asymptomatic.    He is s/p cervical corpectomy and fibular strut fusion in December.       CURRENT DIABETIC MEDS: Humalog in Omnipod insulin pump  Glucometer type: Freestyle strips  Insulin back up plan: Lantus 17 units daily, Humalog per carb counting    Name of Device or Devices used by the patient: Omnipod  When did you start the current therapy you are on: 6/2015  Frequency of changing site/infusion set/tubing/cartridges: 3 days  Frequency of changing CGM: 7 days  Using bolus wizard: yes  Taking bolus with each meal: yes    PUMP SETTINGS:  Basal:  0000 - 0.95 u/hr  0300 - 0.9 u/hr  0600 - 0.95 u/hr  1200 - 0.5 u/hr    I:C ratio:  0000 - 1:7    ISF:  40    Target:   0000 - " "100-140    Active insulin time: 4 hours      Last Eye Exam: 10/2018. + DR. Atif Smith (retina specialist) annually. + cataract. Upcoming surgery for macular hole (pt was told this was non diabetes related).  Last Podiatry Exam: n/a    REVIEW OF SYSTEMS  Constitutional: no c/o fatigue, weakness.  intentional + 8 weight loss.   Eyes: c/o visual disturbances that he attributes to cataract.  Cardiac: no palpitations or chest pain.  Respiratory: no cough or dyspnea.  GI: no c/o abdominal pain or nausea  Skin: no lesions or rashes.  Neuro: + mild numbness, tingling to BLE  Endocrine: denies polyphagia, polydipsia, polyuria      Personally reviewed Past Medical, Surgical, Social History.    Vital Signs  BP (!) 90/50   Pulse 72   Resp 18   Ht 5' 10" (1.778 m)   Wt 80 kg (176 lb 7.7 oz)   BMI 25.32 kg/m²     Personally reviewed the below labs:    Hemoglobin A1C   Date Value Ref Range Status   03/21/2019 7.9 (H) 4.0 - 5.6 % Final     Comment:     ADA Screening Guidelines:  5.7-6.4%  Consistent with prediabetes  >or=6.5%  Consistent with diabetes  High levels of fetal hemoglobin interfere with the HbA1C  assay. Heterozygous hemoglobin variants (HbS, HgC, etc)do  not significantly interfere with this assay.   However, presence of multiple variants may affect accuracy.     11/29/2018 8.4 (H) 4.0 - 5.6 % Final     Comment:     ADA Screening Guidelines:  5.7-6.4%  Consistent with prediabetes  >or=6.5%  Consistent with diabetes  High levels of fetal hemoglobin interfere with the HbA1C  assay. Heterozygous hemoglobin variants (HbS, HgC, etc)do  not significantly interfere with this assay.   However, presence of multiple variants may affect accuracy.     08/13/2018 8.0 (H) 4.0 - 5.6 % Final     Comment:     ADA Screening Guidelines:  5.7-6.4%  Consistent with prediabetes  >or=6.5%  Consistent with diabetes  High levels of fetal hemoglobin interfere with the HbA1C  assay. Heterozygous hemoglobin variants (HbS, HgC, " etc)do  not significantly interfere with this assay.   However, presence of multiple variants may affect accuracy.         Chemistry        Component Value Date/Time     03/21/2019 0803    K 4.5 03/21/2019 0803     03/21/2019 0803    CO2 27 03/21/2019 0803    BUN 18 03/21/2019 0803    CREATININE 1.7 (H) 03/21/2019 0803     (H) 03/21/2019 0803        Component Value Date/Time    CALCIUM 10.0 03/21/2019 0803    ALKPHOS 78 11/29/2018 0810    AST 21 11/29/2018 0810    ALT 18 11/29/2018 0810    BILITOT 0.5 11/29/2018 0810          Lab Results   Component Value Date    CHOL 148 12/12/2018    CHOL 170 05/16/2018    CHOL 169 09/25/2017     Lab Results   Component Value Date    HDL 55 12/12/2018    HDL 64 05/16/2018    HDL 65 09/25/2017     Lab Results   Component Value Date    LDLCALC 60.6 (L) 12/12/2018    LDLCALC 82.6 05/16/2018    LDLCALC 84.4 09/25/2017     Lab Results   Component Value Date    TRIG 162 (H) 12/12/2018    TRIG 117 05/16/2018    TRIG 98 09/25/2017     Lab Results   Component Value Date    CHOLHDL 37.2 12/12/2018    CHOLHDL 37.6 05/16/2018    CHOLHDL 38.5 09/25/2017       Lab Results   Component Value Date    MICALBCREAT 8.4 08/24/2015     Lab Results   Component Value Date    TSH 3.659 11/29/2018       CrCl cannot be calculated (Patient's most recent lab result is older than the maximum 7 days allowed.).    No results found for: [id number]      PHYSICAL EXAMINATION  Constitutional: Appears well, no distress  Neck: Supple, trachea midline; no thyromegaly or nodules.   Respiratory: CTA, even and unlabored.  Cardiovascular: RRR, no murmurs, no carotid bruits. DP pulses  2+ bilaterally; no edema.    Lymph: no cervical or supraclavicular lymphadenopathy  Skin: warm and dry; no lipohypertrophy, or acanthosis nigracans observed.  Neuro: DTR 1+ BUE/2+BLE. Previously, no loss of protective sensation via 10 gm monofilament. Vibratory exam decreased bilaterally  Feet: appropriate footwear.        PUMP DOWNLOAD: see media file for details. Pump time is set > 1 hour behind. Overnight and fasting hyperglycemia noted. Occasional prandial excursions. ~ 3 episodes of hypoglycemia in afternoon (1 following a bolus).  Many glucoses within goal during the day. Uses bolus wizard regularly.   Average TDD - 38.3 u  Basal: 44% (16.9 u)  Bolus: 56% (21.4 u)        DEXCOM DOWNLOAD: See media file for details. Fasting hyperglycemia noted. Glucoses trend down during the day. Occasional hypoglycemia noted in afternoon and one episode mid morning. Sporadic excursions after meals.   Average glucose: 173 mg/dL  % above goal: 51%  % within goal: 47%  % below goal: 1%        A1c goal 7-8%, due to CKD, CAD       Assessment/Plan  1. Type 1 diabetes mellitus with diabetic polyneuropathy        -- Uncontrolled. A1c has decreased. Glucoses are consistently elevated overnight and tend to trend down during the day.   -- increase 0000 - 1200 basal rate to 1 u/hr  -- use temp basal (reduce by 20%) when more active.   -- BG monitoring 6x/day (pt preference even though he uses Dexcom)  -- the night before eye surgery, he was instructed to use Temp Basal feature to reduce basal by 20%.     -- Discussed diagnosis of DM, A1c goals, progression of disease, long term complications and tx options.  Advised patient to check BG before activities, such as driving or exercise.  -- Reviewed hypoglycemia management: treat with 1/2 glass of juice, 1/2 can regular coke, or 4 glucose tablets. Monitor and repeat treatment every 15 minutes until BG is >70 Then have a snack, which includes a complex carbohydrate and protein.   2. Type 1 diabetes mellitus with retinopathy, macular edema presence unspecified, unspecified laterality, unspecified retinopathy severity  -- keep follow ups   3. Type 1 diabetes mellitus with stage 3 chronic kidney disease  -- avoid insulin stacking, hypoglycemia  -- follows with nephrology   4. Coronary artery disease due to  calcified coronary lesion  -- avoid hypoglycemia  -- follows with cardiology    5. Hypertension associated with diabetes  -- slightly hypotensive today. asymptomatic  -- advised him to increase water intake today.   -- defer med mgt to cardiology or nephrology   6. Hyperlipidemia due to type 1 diabetes mellitus  -- Uncontrolled with elevated TRG  -- continue statin  -- his cardiologist has ordered upcoming lipid panel.    7. Insulin pump adjustment  -- download reviewed and changes made   8. Subclinical hypothyroidism -- high normal range  -- patient is asymptomatic  -- TSH with RTC     Total Time and Counselin minutes, >50% time spent counseling as noted above in #1 A/P.       FOLLOW UP  Follow up in about 3 months (around 2019).   Patient instructed to bring BG logs to each follow up   Patient encouraged to call for any BG/medication issues, concerns, or questions.    Orders Placed This Encounter   Procedures    Hemoglobin A1c    TSH

## 2019-03-28 NOTE — PATIENT INSTRUCTIONS
PUMP SETTINGS:  Basal:  0000 - 1 u/hr  1200 - 0.5 u/hr    I:C ratio:  0000 - 1:7    ISF:  40    Target:   0000 - 100-140    Active insulin time: 4 hours

## 2019-05-04 DIAGNOSIS — G90.3 NEUROGENIC ORTHOSTATIC HYPOTENSION: ICD-10-CM

## 2019-05-06 RX ORDER — FOLIC ACID 1 MG/1
1 TABLET ORAL DAILY
Qty: 90 TABLET | Refills: 3 | Status: SHIPPED | OUTPATIENT
Start: 2019-05-06 | End: 2020-05-12

## 2019-05-15 ENCOUNTER — LAB VISIT (OUTPATIENT)
Dept: LAB | Facility: HOSPITAL | Age: 64
End: 2019-05-15
Attending: INTERNAL MEDICINE
Payer: COMMERCIAL

## 2019-05-15 DIAGNOSIS — I12.9 PARENCHYMAL RENAL HYPERTENSION: ICD-10-CM

## 2019-05-15 DIAGNOSIS — N18.9 ANEMIA OF CHRONIC RENAL FAILURE: Primary | ICD-10-CM

## 2019-05-15 DIAGNOSIS — N18.30 CHRONIC KIDNEY DISEASE, STAGE III (MODERATE): ICD-10-CM

## 2019-05-15 DIAGNOSIS — R80.9 PROTEINURIA: ICD-10-CM

## 2019-05-15 DIAGNOSIS — N40.0 BENIGN ENLARGEMENT OF PROSTATE: ICD-10-CM

## 2019-05-15 DIAGNOSIS — N18.2 CHRONIC KIDNEY DISEASE, STAGE II (MILD): ICD-10-CM

## 2019-05-15 DIAGNOSIS — D63.1 ANEMIA OF CHRONIC RENAL FAILURE: Primary | ICD-10-CM

## 2019-05-15 LAB
ALBUMIN SERPL BCP-MCNC: 4 G/DL (ref 3.5–5.2)
ALP SERPL-CCNC: 63 U/L (ref 55–135)
ALT SERPL W/O P-5'-P-CCNC: 24 U/L (ref 10–44)
ANION GAP SERPL CALC-SCNC: 9 MMOL/L (ref 8–16)
AST SERPL-CCNC: 23 U/L (ref 10–40)
BASOPHILS # BLD AUTO: 0.03 K/UL (ref 0–0.2)
BASOPHILS NFR BLD: 0.6 % (ref 0–1.9)
BILIRUB SERPL-MCNC: 0.6 MG/DL (ref 0.1–1)
BUN SERPL-MCNC: 20 MG/DL (ref 8–23)
CALCIUM SERPL-MCNC: 9.5 MG/DL (ref 8.7–10.5)
CHLORIDE SERPL-SCNC: 104 MMOL/L (ref 95–110)
CO2 SERPL-SCNC: 27 MMOL/L (ref 23–29)
CREAT SERPL-MCNC: 1.9 MG/DL (ref 0.5–1.4)
DIFFERENTIAL METHOD: ABNORMAL
EOSINOPHIL # BLD AUTO: 0.3 K/UL (ref 0–0.5)
EOSINOPHIL NFR BLD: 5.2 % (ref 0–8)
ERYTHROCYTE [DISTWIDTH] IN BLOOD BY AUTOMATED COUNT: 12.6 % (ref 11.5–14.5)
EST. GFR  (AFRICAN AMERICAN): 41.8 ML/MIN/1.73 M^2
EST. GFR  (NON AFRICAN AMERICAN): 36.2 ML/MIN/1.73 M^2
FERRITIN SERPL-MCNC: 46 NG/ML (ref 20–300)
GLUCOSE SERPL-MCNC: 188 MG/DL (ref 70–110)
HCT VFR BLD AUTO: 35.5 % (ref 40–54)
HGB BLD-MCNC: 12.1 G/DL (ref 14–18)
IMM GRANULOCYTES # BLD AUTO: 0.02 K/UL (ref 0–0.04)
IMM GRANULOCYTES NFR BLD AUTO: 0.4 % (ref 0–0.5)
IRON SERPL-MCNC: 88 UG/DL (ref 45–160)
LYMPHOCYTES # BLD AUTO: 1.6 K/UL (ref 1–4.8)
LYMPHOCYTES NFR BLD: 31.1 % (ref 18–48)
MCH RBC QN AUTO: 32.9 PG (ref 27–31)
MCHC RBC AUTO-ENTMCNC: 34.1 G/DL (ref 32–36)
MCV RBC AUTO: 97 FL (ref 82–98)
MONOCYTES # BLD AUTO: 0.4 K/UL (ref 0.3–1)
MONOCYTES NFR BLD: 8.5 % (ref 4–15)
NEUTROPHILS # BLD AUTO: 2.8 K/UL (ref 1.8–7.7)
NEUTROPHILS NFR BLD: 54.2 % (ref 38–73)
NRBC BLD-RTO: 0 /100 WBC
PLATELET # BLD AUTO: 241 K/UL (ref 150–350)
PMV BLD AUTO: 10.2 FL (ref 9.2–12.9)
POTASSIUM SERPL-SCNC: 4.7 MMOL/L (ref 3.5–5.1)
PROT SERPL-MCNC: 7 G/DL (ref 6–8.4)
RBC # BLD AUTO: 3.68 M/UL (ref 4.6–6.2)
SATURATED IRON: 21 % (ref 20–50)
SODIUM SERPL-SCNC: 140 MMOL/L (ref 136–145)
TOTAL IRON BINDING CAPACITY: 420 UG/DL (ref 250–450)
TRANSFERRIN SERPL-MCNC: 284 MG/DL (ref 200–375)
WBC # BLD AUTO: 5.15 K/UL (ref 3.9–12.7)

## 2019-05-15 PROCEDURE — 82728 ASSAY OF FERRITIN: CPT

## 2019-05-15 PROCEDURE — 85025 COMPLETE CBC W/AUTO DIFF WBC: CPT

## 2019-05-15 PROCEDURE — 36415 COLL VENOUS BLD VENIPUNCTURE: CPT | Mod: PO

## 2019-05-15 PROCEDURE — 80053 COMPREHEN METABOLIC PANEL: CPT

## 2019-05-15 PROCEDURE — 83540 ASSAY OF IRON: CPT

## 2019-05-25 DIAGNOSIS — E10.42 TYPE 1 DIABETES MELLITUS WITH DIABETIC POLYNEUROPATHY: ICD-10-CM

## 2019-05-27 RX ORDER — INSULIN LISPRO 100 [IU]/ML
INJECTION, SOLUTION INTRAVENOUS; SUBCUTANEOUS
Qty: 15 SYRINGE | Refills: 11 | Status: SHIPPED | OUTPATIENT
Start: 2019-05-27 | End: 2021-01-28

## 2019-06-24 ENCOUNTER — LAB VISIT (OUTPATIENT)
Dept: LAB | Facility: HOSPITAL | Age: 64
End: 2019-06-24
Attending: NURSE PRACTITIONER
Payer: COMMERCIAL

## 2019-06-24 ENCOUNTER — PATIENT MESSAGE (OUTPATIENT)
Dept: ENDOCRINOLOGY | Facility: CLINIC | Age: 64
End: 2019-06-24

## 2019-06-24 DIAGNOSIS — D63.1 ANEMIA OF CHRONIC RENAL FAILURE: Primary | ICD-10-CM

## 2019-06-24 DIAGNOSIS — N18.9 ANEMIA OF CHRONIC RENAL FAILURE: Primary | ICD-10-CM

## 2019-06-24 DIAGNOSIS — E10.9 DIABETES MELLITUS TYPE I: ICD-10-CM

## 2019-06-24 DIAGNOSIS — I12.9 PARENCHYMAL RENAL HYPERTENSION: ICD-10-CM

## 2019-06-24 DIAGNOSIS — E10.42 TYPE 1 DIABETES MELLITUS WITH DIABETIC POLYNEUROPATHY: ICD-10-CM

## 2019-06-24 DIAGNOSIS — N18.30 CHRONIC KIDNEY DISEASE, STAGE III (MODERATE): ICD-10-CM

## 2019-06-24 DIAGNOSIS — E03.8 SUBCLINICAL HYPOTHYROIDISM: ICD-10-CM

## 2019-06-24 DIAGNOSIS — N18.2 CHRONIC KIDNEY DISEASE, STAGE II (MILD): ICD-10-CM

## 2019-06-24 DIAGNOSIS — I65.23 BILATERAL CAROTID ARTERY OCCLUSION: ICD-10-CM

## 2019-06-24 DIAGNOSIS — E11.9 DIABETES MELLITUS WITHOUT COMPLICATION: ICD-10-CM

## 2019-06-24 DIAGNOSIS — N40.0 BENIGN ENLARGEMENT OF PROSTATE: ICD-10-CM

## 2019-06-24 DIAGNOSIS — N17.9 ACUTE KIDNEY FAILURE, UNSPECIFIED: ICD-10-CM

## 2019-06-24 DIAGNOSIS — R80.9 PROTEINURIA: ICD-10-CM

## 2019-06-24 LAB
ALBUMIN SERPL BCP-MCNC: 4 G/DL (ref 3.5–5.2)
ALBUMIN/CREAT UR: 13.1 UG/MG (ref 0–30)
ALP SERPL-CCNC: 62 U/L (ref 55–135)
ALT SERPL W/O P-5'-P-CCNC: 22 U/L (ref 10–44)
ANION GAP SERPL CALC-SCNC: 7 MMOL/L (ref 8–16)
AST SERPL-CCNC: 24 U/L (ref 10–40)
BASOPHILS # BLD AUTO: 0.05 K/UL (ref 0–0.2)
BASOPHILS NFR BLD: 0.9 % (ref 0–1.9)
BILIRUB SERPL-MCNC: 0.6 MG/DL (ref 0.1–1)
BILIRUB UR QL STRIP: NEGATIVE
BUN SERPL-MCNC: 15 MG/DL (ref 8–23)
CALCIUM SERPL-MCNC: 9.8 MG/DL (ref 8.7–10.5)
CHLORIDE SERPL-SCNC: 100 MMOL/L (ref 95–110)
CHOLEST SERPL-MCNC: 146 MG/DL (ref 120–199)
CHOLEST/HDLC SERPL: 2.2 {RATIO} (ref 2–5)
CLARITY UR: CLEAR
CO2 SERPL-SCNC: 27 MMOL/L (ref 23–29)
COLOR UR: YELLOW
CREAT SERPL-MCNC: 1.2 MG/DL (ref 0.5–1.4)
CREAT UR-MCNC: 61 MG/DL (ref 23–375)
DIFFERENTIAL METHOD: ABNORMAL
EOSINOPHIL # BLD AUTO: 0.3 K/UL (ref 0–0.5)
EOSINOPHIL NFR BLD: 5.1 % (ref 0–8)
ERYTHROCYTE [DISTWIDTH] IN BLOOD BY AUTOMATED COUNT: 12.4 % (ref 11.5–14.5)
EST. GFR  (AFRICAN AMERICAN): >60 ML/MIN/1.73 M^2
EST. GFR  (NON AFRICAN AMERICAN): >60 ML/MIN/1.73 M^2
ESTIMATED AVG GLUCOSE: 171 MG/DL (ref 68–131)
FERRITIN SERPL-MCNC: 56 NG/ML (ref 20–300)
GLUCOSE SERPL-MCNC: 134 MG/DL (ref 70–110)
GLUCOSE UR QL STRIP: ABNORMAL
HBA1C MFR BLD HPLC: 7.6 % (ref 4–5.6)
HCT VFR BLD AUTO: 35.5 % (ref 40–54)
HDLC SERPL-MCNC: 65 MG/DL (ref 40–75)
HDLC SERPL: 44.5 % (ref 20–50)
HGB BLD-MCNC: 11.9 G/DL (ref 14–18)
HGB UR QL STRIP: NEGATIVE
IMM GRANULOCYTES # BLD AUTO: 0.02 K/UL (ref 0–0.04)
IMM GRANULOCYTES NFR BLD AUTO: 0.4 % (ref 0–0.5)
IRON SERPL-MCNC: 100 UG/DL (ref 45–160)
KETONES UR QL STRIP: NEGATIVE
LDLC SERPL CALC-MCNC: 62.8 MG/DL (ref 63–159)
LEUKOCYTE ESTERASE UR QL STRIP: NEGATIVE
LYMPHOCYTES # BLD AUTO: 1.7 K/UL (ref 1–4.8)
LYMPHOCYTES NFR BLD: 30 % (ref 18–48)
MCH RBC QN AUTO: 32.3 PG (ref 27–31)
MCHC RBC AUTO-ENTMCNC: 33.5 G/DL (ref 32–36)
MCV RBC AUTO: 97 FL (ref 82–98)
MICROALBUMIN UR DL<=1MG/L-MCNC: 8 UG/ML
MONOCYTES # BLD AUTO: 0.5 K/UL (ref 0.3–1)
MONOCYTES NFR BLD: 8.9 % (ref 4–15)
NEUTROPHILS # BLD AUTO: 3 K/UL (ref 1.8–7.7)
NEUTROPHILS NFR BLD: 54.7 % (ref 38–73)
NITRITE UR QL STRIP: NEGATIVE
NONHDLC SERPL-MCNC: 81 MG/DL
NRBC BLD-RTO: 0 /100 WBC
PH UR STRIP: 5 [PH] (ref 5–8)
PLATELET # BLD AUTO: 265 K/UL (ref 150–350)
PMV BLD AUTO: 10.3 FL (ref 9.2–12.9)
POTASSIUM SERPL-SCNC: 4.3 MMOL/L (ref 3.5–5.1)
PROT SERPL-MCNC: 7.3 G/DL (ref 6–8.4)
PROT UR QL STRIP: NEGATIVE
PTH-INTACT SERPL-MCNC: 52 PG/ML (ref 9–77)
RBC # BLD AUTO: 3.68 M/UL (ref 4.6–6.2)
SATURATED IRON: 24 % (ref 20–50)
SODIUM SERPL-SCNC: 134 MMOL/L (ref 136–145)
SP GR UR STRIP: 1.02 (ref 1–1.03)
T4 FREE SERPL-MCNC: 0.85 NG/DL (ref 0.71–1.51)
T4 FREE SERPL-MCNC: 0.86 NG/DL (ref 0.71–1.51)
TOTAL IRON BINDING CAPACITY: 419 UG/DL (ref 250–450)
TRANSFERRIN SERPL-MCNC: 283 MG/DL (ref 200–375)
TRIGL SERPL-MCNC: 91 MG/DL (ref 30–150)
TSH SERPL DL<=0.005 MIU/L-ACNC: 4.46 UIU/ML (ref 0.4–4)
TSH SERPL DL<=0.005 MIU/L-ACNC: 4.52 UIU/ML (ref 0.4–4)
URN SPEC COLLECT METH UR: ABNORMAL
WBC # BLD AUTO: 5.53 K/UL (ref 3.9–12.7)

## 2019-06-24 PROCEDURE — 85025 COMPLETE CBC W/AUTO DIFF WBC: CPT

## 2019-06-24 PROCEDURE — 36415 COLL VENOUS BLD VENIPUNCTURE: CPT | Mod: PO

## 2019-06-24 PROCEDURE — 80053 COMPREHEN METABOLIC PANEL: CPT

## 2019-06-24 PROCEDURE — 84443 ASSAY THYROID STIM HORMONE: CPT

## 2019-06-24 PROCEDURE — 80061 LIPID PANEL: CPT

## 2019-06-24 PROCEDURE — 84439 ASSAY OF FREE THYROXINE: CPT

## 2019-06-24 PROCEDURE — 83540 ASSAY OF IRON: CPT

## 2019-06-24 PROCEDURE — 82728 ASSAY OF FERRITIN: CPT

## 2019-06-24 PROCEDURE — 84443 ASSAY THYROID STIM HORMONE: CPT | Mod: 91

## 2019-06-24 PROCEDURE — 83036 HEMOGLOBIN GLYCOSYLATED A1C: CPT

## 2019-06-24 PROCEDURE — 82043 UR ALBUMIN QUANTITATIVE: CPT

## 2019-06-24 PROCEDURE — 84439 ASSAY OF FREE THYROXINE: CPT | Mod: 91

## 2019-06-24 PROCEDURE — 83970 ASSAY OF PARATHORMONE: CPT

## 2019-06-24 PROCEDURE — 81003 URINALYSIS AUTO W/O SCOPE: CPT | Mod: PO

## 2019-07-11 ENCOUNTER — OFFICE VISIT (OUTPATIENT)
Dept: ENDOCRINOLOGY | Facility: CLINIC | Age: 64
End: 2019-07-11
Payer: COMMERCIAL

## 2019-07-11 ENCOUNTER — TELEPHONE (OUTPATIENT)
Dept: ENDOCRINOLOGY | Facility: CLINIC | Age: 64
End: 2019-07-11

## 2019-07-11 VITALS
HEART RATE: 88 BPM | RESPIRATION RATE: 18 BRPM | DIASTOLIC BLOOD PRESSURE: 70 MMHG | WEIGHT: 176.5 LBS | BODY MASS INDEX: 25.27 KG/M2 | SYSTOLIC BLOOD PRESSURE: 104 MMHG | HEIGHT: 70 IN

## 2019-07-11 DIAGNOSIS — E10.69 HYPERLIPIDEMIA DUE TO TYPE 1 DIABETES MELLITUS: ICD-10-CM

## 2019-07-11 DIAGNOSIS — E10.22 TYPE 1 DIABETES MELLITUS WITH STAGE 3 CHRONIC KIDNEY DISEASE: ICD-10-CM

## 2019-07-11 DIAGNOSIS — I15.2 HYPERTENSION ASSOCIATED WITH DIABETES: ICD-10-CM

## 2019-07-11 DIAGNOSIS — I25.84 CORONARY ARTERY DISEASE DUE TO CALCIFIED CORONARY LESION: ICD-10-CM

## 2019-07-11 DIAGNOSIS — E11.59 HYPERTENSION ASSOCIATED WITH DIABETES: ICD-10-CM

## 2019-07-11 DIAGNOSIS — I25.10 CORONARY ARTERY DISEASE DUE TO CALCIFIED CORONARY LESION: ICD-10-CM

## 2019-07-11 DIAGNOSIS — E10.42 TYPE 1 DIABETES MELLITUS WITH DIABETIC POLYNEUROPATHY: Primary | ICD-10-CM

## 2019-07-11 DIAGNOSIS — E78.5 HYPERLIPIDEMIA DUE TO TYPE 1 DIABETES MELLITUS: ICD-10-CM

## 2019-07-11 DIAGNOSIS — E03.8 SUBCLINICAL HYPOTHYROIDISM: ICD-10-CM

## 2019-07-11 DIAGNOSIS — E10.319 TYPE 1 DIABETES MELLITUS WITH RETINOPATHY, MACULAR EDEMA PRESENCE UNSPECIFIED, UNSPECIFIED LATERALITY, UNSPECIFIED RETINOPATHY SEVERITY: ICD-10-CM

## 2019-07-11 DIAGNOSIS — Z96.41 INSULIN PUMP STATUS: ICD-10-CM

## 2019-07-11 DIAGNOSIS — N18.30 TYPE 1 DIABETES MELLITUS WITH STAGE 3 CHRONIC KIDNEY DISEASE: ICD-10-CM

## 2019-07-11 PROCEDURE — 3078F PR MOST RECENT DIASTOLIC BLOOD PRESSURE < 80 MM HG: ICD-10-PCS | Mod: CPTII,S$GLB,, | Performed by: NURSE PRACTITIONER

## 2019-07-11 PROCEDURE — 1101F PT FALLS ASSESS-DOCD LE1/YR: CPT | Mod: CPTII,S$GLB,, | Performed by: NURSE PRACTITIONER

## 2019-07-11 PROCEDURE — 99999 PR PBB SHADOW E&M-EST. PATIENT-LVL V: CPT | Mod: PBBFAC,,, | Performed by: NURSE PRACTITIONER

## 2019-07-11 PROCEDURE — 99999 PR PBB SHADOW E&M-EST. PATIENT-LVL V: ICD-10-PCS | Mod: PBBFAC,,, | Performed by: NURSE PRACTITIONER

## 2019-07-11 PROCEDURE — 3008F PR BODY MASS INDEX (BMI) DOCUMENTED: ICD-10-PCS | Mod: CPTII,S$GLB,, | Performed by: NURSE PRACTITIONER

## 2019-07-11 PROCEDURE — 3078F DIAST BP <80 MM HG: CPT | Mod: CPTII,S$GLB,, | Performed by: NURSE PRACTITIONER

## 2019-07-11 PROCEDURE — 99215 PR OFFICE/OUTPT VISIT, EST, LEVL V, 40-54 MIN: ICD-10-PCS | Mod: S$GLB,,, | Performed by: NURSE PRACTITIONER

## 2019-07-11 PROCEDURE — 3008F BODY MASS INDEX DOCD: CPT | Mod: CPTII,S$GLB,, | Performed by: NURSE PRACTITIONER

## 2019-07-11 PROCEDURE — 3074F SYST BP LT 130 MM HG: CPT | Mod: CPTII,S$GLB,, | Performed by: NURSE PRACTITIONER

## 2019-07-11 PROCEDURE — 3045F PR MOST RECENT HEMOGLOBIN A1C LEVEL 7.0-9.0%: CPT | Mod: CPTII,S$GLB,, | Performed by: NURSE PRACTITIONER

## 2019-07-11 PROCEDURE — 99215 OFFICE O/P EST HI 40 MIN: CPT | Mod: S$GLB,,, | Performed by: NURSE PRACTITIONER

## 2019-07-11 PROCEDURE — 1101F PR PT FALLS ASSESS DOC 0-1 FALLS W/OUT INJ PAST YR: ICD-10-PCS | Mod: CPTII,S$GLB,, | Performed by: NURSE PRACTITIONER

## 2019-07-11 PROCEDURE — 3074F PR MOST RECENT SYSTOLIC BLOOD PRESSURE < 130 MM HG: ICD-10-PCS | Mod: CPTII,S$GLB,, | Performed by: NURSE PRACTITIONER

## 2019-07-11 PROCEDURE — 3045F PR MOST RECENT HEMOGLOBIN A1C LEVEL 7.0-9.0%: ICD-10-PCS | Mod: CPTII,S$GLB,, | Performed by: NURSE PRACTITIONER

## 2019-07-11 RX ORDER — CIPROFLOXACIN HYDROCHLORIDE 3 MG/ML
SOLUTION/ DROPS OPHTHALMIC
Refills: 0 | COMMUNITY
Start: 2019-06-13 | End: 2020-01-20

## 2019-07-11 RX ORDER — LEVOTHYROXINE SODIUM 25 UG/1
25 TABLET ORAL
Qty: 30 TABLET | Refills: 11 | Status: SHIPPED | OUTPATIENT
Start: 2019-07-11 | End: 2020-02-03

## 2019-07-11 NOTE — PATIENT INSTRUCTIONS
Take levothyroxine (thyroid medication) at least 30 minutes before other pills or food. Take with water only.    PUMP SETTINGS:  Basal:  0000 - 1 u/hr  1200 - 0.5 u/hr    I:C ratio:  0000 - 1:7    ISF:  40    Target:   0000 - 100-140    Active insulin time: 4 hours

## 2019-07-11 NOTE — PROGRESS NOTES
CC: Mr. Narciso Becerra arrives today for management of Type 1  DM and review of chronic medical conditions, as listed in the visit diagnosis section of this encounter.     HPI: Mr. Narciso Becerra was diagnosed with Type 1  DM at age 18 after viral illness. Denies FH of DM. Denies hospitalizations due to DM.   Wears Dexcom G5.  He started insulin pump therapy with Omnipod in June 2015.  Has CAD, is s/p CABG in 2012 and follows with cardiology (Dr. Cedeño).  Follows with Dr. Estrada every 3-6 months for CKD.     Last seen by me in March.     He is currently not wearing Dexcom. He is updating to the G6.    BG readings are checked 6x/day.    Hypoglycemia: Rare - he does report that this occurred on Fourth of July because he was more active than usual.   Hypoglycemic Symptoms: dizziness, jitteriness and sweating   Hypoglycemia Treatment: Raisinetts candy, [place] punch.     Exercise: No formal     Dietary Habits: Eats 3 meals/day. Rare snacking.     Last DM education appointment: 2015    He plans to retire in January and change to Medicare. He has concerns about potential difficulty with pump/sensor coverage.     Continues with some mild dizziness, lethargy for the past several months. He has since stopped Ranexa and gabapentin. His cardiologist is aware but cannot identify a cause. Pt previously went through ENT workup.       CURRENT DIABETIC MEDS: Humalog in Omnipod insulin pump  Glucometer type: Freestyle strips  Insulin back up plan: Lantus 17 units daily, Humalog per carb counting    Name of Device or Devices used by the patient: Omnipod  When did you start the current therapy you are on: 6/2015  Frequency of changing site/infusion set/tubing/cartridges: 3 days  Frequency of changing CGM: n/a  Using bolus wizard: yes  Taking bolus with each meal: yes    PUMP SETTINGS:  Basal:  0000 - 1 u/hr  1200 - 0.5 u/hr    I:C ratio:  0000 - 1:7    ISF:  40    Target:   0000 - 100-140    Active insulin time: 4  "hours      Last Eye Exam: 6/2019 + DR.  Sees Dr. Smith. Also sees retina specialist in this practice. + cataract.   Last Podiatry Exam: n/a    REVIEW OF SYSTEMS  Constitutional: no c/o weakness, weight loss. + fatigue. Reports lack of energy.  Eyes: c/o visual disturbances that he attributes to macular hole, cataract.  Cardiac: no palpitations or chest pain.  Respiratory: no cough or dyspnea.  GI: no c/o abdominal pain or nausea  Skin: no lesions or rashes.  Neuro: + mild numbness, tingling to BLE. + chronic dizziness.  Endocrine: denies polyphagia, polydipsia, polyuria      Personally reviewed Past Medical, Surgical, Social History.    Vital Signs  /70   Pulse 88   Resp 18   Ht 5' 10" (1.778 m)   Wt 80 kg (176 lb 7.7 oz)   BMI 25.32 kg/m²     Personally reviewed the below labs:    Hemoglobin A1C   Date Value Ref Range Status   06/24/2019 7.6 (H) 4.0 - 5.6 % Final     Comment:     ADA Screening Guidelines:  5.7-6.4%  Consistent with prediabetes  >or=6.5%  Consistent with diabetes  High levels of fetal hemoglobin interfere with the HbA1C  assay. Heterozygous hemoglobin variants (HbS, HgC, etc)do  not significantly interfere with this assay.   However, presence of multiple variants may affect accuracy.     03/21/2019 7.9 (H) 4.0 - 5.6 % Final     Comment:     ADA Screening Guidelines:  5.7-6.4%  Consistent with prediabetes  >or=6.5%  Consistent with diabetes  High levels of fetal hemoglobin interfere with the HbA1C  assay. Heterozygous hemoglobin variants (HbS, HgC, etc)do  not significantly interfere with this assay.   However, presence of multiple variants may affect accuracy.     11/29/2018 8.4 (H) 4.0 - 5.6 % Final     Comment:     ADA Screening Guidelines:  5.7-6.4%  Consistent with prediabetes  >or=6.5%  Consistent with diabetes  High levels of fetal hemoglobin interfere with the HbA1C  assay. Heterozygous hemoglobin variants (HbS, HgC, etc)do  not significantly interfere with this assay. "   However, presence of multiple variants may affect accuracy.         Chemistry        Component Value Date/Time     (L) 06/24/2019 0811    K 4.3 06/24/2019 0811     06/24/2019 0811    CO2 27 06/24/2019 0811    BUN 15 06/24/2019 0811    CREATININE 1.2 06/24/2019 0811     (H) 06/24/2019 0811        Component Value Date/Time    CALCIUM 9.8 06/24/2019 0811    ALKPHOS 62 06/24/2019 0811    AST 24 06/24/2019 0811    ALT 22 06/24/2019 0811    BILITOT 0.6 06/24/2019 0811          Lab Results   Component Value Date    CHOL 146 06/24/2019    CHOL 148 12/12/2018    CHOL 170 05/16/2018     Lab Results   Component Value Date    HDL 65 06/24/2019    HDL 55 12/12/2018    HDL 64 05/16/2018     Lab Results   Component Value Date    LDLCALC 62.8 (L) 06/24/2019    LDLCALC 60.6 (L) 12/12/2018    LDLCALC 82.6 05/16/2018     Lab Results   Component Value Date    TRIG 91 06/24/2019    TRIG 162 (H) 12/12/2018    TRIG 117 05/16/2018     Lab Results   Component Value Date    CHOLHDL 44.5 06/24/2019    CHOLHDL 37.2 12/12/2018    CHOLHDL 37.6 05/16/2018       Lab Results   Component Value Date    MICALBCREAT 13.1 06/24/2019     Lab Results   Component Value Date    TSH 4.459 (H) 06/24/2019    TSH 4.522 (H) 06/24/2019       CrCl cannot be calculated (Patient's most recent lab result is older than the maximum 7 days allowed.).    No results found for: [id number]      PHYSICAL EXAMINATION  Constitutional: Appears well, no distress  Neck: Supple, trachea midline; no thyromegaly or nodules.   Respiratory: CTA, even and unlabored.  Cardiovascular: RRR, no murmurs, no carotid bruits. DP pulses  2+ bilaterally; no edema.    Lymph: no cervical or supraclavicular lymphadenopathy  Skin: warm and dry; no lipohypertrophy, or acanthosis nigracans observed.  Neuro: DTR 1+ BUE/1+BLE. Previously, no loss of protective sensation via 10 gm monofilament. Vibratory exam decreased bilaterally  Feet: appropriate footwear.       PUMP DOWNLOAD:  see media file for details. Having more elevated glucoses this week, particularly in the evening and after dinner. However, his glucoses were much more stable and mostly in normal range the week prior. Hypoglycemia noted on 7/4, when pt states he was more active. Another hypo episode followed a meal bolus. Uses bolus wizard regularly. Entering CHO if overnight glucose is elevated.   Average TDD - 38.3 u  Basal: 45% (17.4 u)  Bolus: 55% (20.9 u)        DEXCOM DOWNLOAD: currently not in use.  Average glucose:   % above goal:   % within goal:   % below goal:         A1c goal 7-8%, due to CKD, CAD       Assessment/Plan  1. Type 1 diabetes mellitus with diabetic polyneuropathy        -- Acceptable control. Will refrain from pump setting adjustments since he had a [place] difference in glucose levels within a 2 week period. Many glucoses are within normal range.    -- advised pt to take correction bolus ONLY when he has elevated glucose overnight. Do not enter CHO if not eating them.   -- DM education for G6 upgrade.  -- BG monitoring 6x/day (pt preference even though he uses Dexcom)    -- Discussed diagnosis of DM, A1c goals, progression of disease, long term complications and tx options.  Advised patient to check BG before activities, such as driving or exercise.  -- Reviewed hypoglycemia management: treat with 1/2 glass of juice, 1/2 can regular coke, or 4 glucose tablets. Monitor and repeat treatment every 15 minutes until BG is >70 Then have a snack, which includes a complex carbohydrate and protein.    -- takes aspirin, statin   2. Type 1 diabetes mellitus with retinopathy, macular edema presence unspecified, unspecified laterality, unspecified retinopathy severity  -- keep follow ups   3. Type 1 diabetes mellitus with stage 3 chronic kidney disease  -- avoid insulin stacking, hypoglycemia  -- follows with nephrology   4. Coronary artery disease due to calcified coronary lesion  -- avoid hypoglycemia  -- follows with  cardiology    5. Hypertension associated with diabetes  -- controlled  -- defer med mgt to cardiology or nephrology   6. Hyperlipidemia due to type 1 diabetes mellitus  -- controlled  -- continue statin   7. Insulin pump status  -- download reviewed    8. Subclinical hypothyroidism -- persistent. Now with fatigue and ongoing dizziness.  -- start levothyroxine 25 mcg daily. Take 30 min before other pills and food.   -- TSH in 6 weeks     Total Time and Counselin minutes, >50% time spent counseling as noted above in #1 A/P.       FOLLOW UP  Follow up in about 4 months (around 2019).   Patient instructed to bring BG logs to each follow up   Patient encouraged to call for any BG/medication issues, concerns, or questions.    Orders Placed This Encounter   Procedures    TSH    Hemoglobin A1c    Ambulatory Referral to Diabetes Education

## 2019-07-11 NOTE — TELEPHONE ENCOUNTER
----- Message from Elke Monroe sent at 7/11/2019  8:53 AM CDT -----  Type: Needs Medical Advice    Who Called:  [facility]  Best Call Back Number: [phone]  Additional Information: Needs to talk to office concerning documents that are needed for patient's insulin pump. Please call for the details.

## 2019-07-17 ENCOUNTER — TELEPHONE (OUTPATIENT)
Dept: ENDOCRINOLOGY | Facility: CLINIC | Age: 64
End: 2019-07-17

## 2019-07-17 NOTE — TELEPHONE ENCOUNTER
----- Message from Elke Monroe sent at 7/17/2019  1:32 PM CDT -----  Type: Needs Medical Advice    Who Called:  Patient  Best Call Back Number: [phone]  Additional Information: [facility] sent in prescription for his insulin pump to office who did fill it out but forgot to sign it. Patient needs office to sign the prescription and fax it back to fax [phone]. Please call patient and completed.

## 2019-07-17 NOTE — TELEPHONE ENCOUNTER
Pt ok with paperwork being completed and signed by another Provider and sent back in a day or two.    Pt wants paperwork faxed to

## 2019-07-17 NOTE — TELEPHONE ENCOUNTER
Left message informing pt the form has been corrected and complete, ready for signature but unfortunately KLAUDIA Garcia had to go out sick. CAROLYN Salazar will  those forms and bring them here to be signed by another Provider and refaxed in a day or two  Pt advised to call back with any questions or concerns

## 2019-07-25 ENCOUNTER — CLINICAL SUPPORT (OUTPATIENT)
Dept: DIABETES | Facility: CLINIC | Age: 64
End: 2019-07-25
Payer: COMMERCIAL

## 2019-07-25 VITALS — WEIGHT: 176.38 LBS | BODY MASS INDEX: 25.25 KG/M2 | HEIGHT: 70 IN

## 2019-07-25 DIAGNOSIS — N18.30 TYPE 1 DIABETES MELLITUS WITH STAGE 3 CHRONIC KIDNEY DISEASE: Primary | ICD-10-CM

## 2019-07-25 DIAGNOSIS — E10.22 TYPE 1 DIABETES MELLITUS WITH STAGE 3 CHRONIC KIDNEY DISEASE: Primary | ICD-10-CM

## 2019-07-25 PROCEDURE — 95249 PR GLUCOSE MONITORING, 72 HRS, SUB-Q SENSOR, PATIENT PROVIDED: ICD-10-PCS | Mod: S$GLB,,, | Performed by: DIETITIAN, REGISTERED

## 2019-07-25 PROCEDURE — 99999 PR PBB SHADOW E&M-EST. PATIENT-LVL I: CPT | Mod: PBBFAC,,,

## 2019-07-25 PROCEDURE — 95249 CONT GLUC MNTR PT PROV EQP: CPT | Mod: S$GLB,,, | Performed by: DIETITIAN, REGISTERED

## 2019-07-25 PROCEDURE — 99999 PR PBB SHADOW E&M-EST. PATIENT-LVL I: ICD-10-PCS | Mod: PBBFAC,,,

## 2019-07-25 NOTE — PROGRESS NOTES
Diabetes Education  Author: Thuy Willard RD  Date: 7/25/2019    Diabetes Care Management Summary  Diabetes Education Record Assessment/Progress: Comprehensive/Group  Current Diabetes Risk Level: Moderate     Last A1c:   Lab Results   Component Value Date    HGBA1C 7.6 (H) 06/24/2019     Last visit with Diabetes Educator: : 07/25/2019    Diabetes Type  Diabetes Type : Type I    Diabetes History  Current Treatment: Insulin pump, Insulin(Omnipod using Humalog insulin)    Health Maintenance was reviewed today with patient. Discussed with patient importance of routine eye exams, foot exams/foot care, blood work (i.e.: A1c, microalbumin, and lipid), dental visits, yearly flu vaccine, and pneumonia vaccine as indicated by PCP. Patient verbalized understanding.     Health Maintenance Topics with due status: Not Due       Topic Last Completion Date    TETANUS VACCINE 05/09/2012    Pneumococcal Vaccine (65+ High/Highest Risk) 06/09/2017    Colonoscopy 07/17/2017    Eye Exam 12/31/2018    Influenza Vaccine 01/18/2019    Foot Exam 01/18/2019    Lipid Panel 06/24/2019    Hemoglobin A1c 06/24/2019    Urine Microalbumin 06/24/2019    High Dose Statin 07/11/2019    Aspirin/Antiplatelet Therapy 07/11/2019     There are no preventive care reminders to display for this patient.    Monitoring   Do you use a personal continuous glucose monitor?: Yes  What kind of glucose monitor do you use?: Dexcom(Upgrading from Dexcom G5 (has not worn in a month) to Dexcom G6)  In the last month, how often have you had a low blood sugar reaction?: once  What are your symptoms of low blood sugar?: shakiness  How do you treat low blood sugar?: states he overtreats out of abundant caution  Can you tell when your blood sugar is too high?: no    Current Diabetes Treatment   Current Treatment: Insulin pump, Insulin(Omnipod using Humalog insulin)     Diabetes Education Assessment/Progress  Monitoring (monitoring blood glucose/other parameters & using  "results): Discussion, Demonstration, Return Demonstration, Instructed, Comprehends Key Points, Written Materials Provided    DIABETES EDUCATOR NOTE   PLACEMENT OF DEXCOM PERSONAL CONTINOUS GLUCOSE MONITORING SYSTEM (CGMS)     REFERRING PROVIDER: Trudy Recinos NP     Patient is here in clinic today for placement of personal continuous glucose monitoring system (CGMS).   Patient upgrading from a Dexcom G5 to a Dexcom G6. Patient will use Dexcom G6  at this time.  Pt verbalizes understanding to change sensor every 10 days. He is also aware that each time a new sensor is placed there is a 2 hour warm up period.  No calibration is necessary for Dexcom G6, patient understands to avoid calibration when glucose levels changing rapidly.       A detailed explanation of Dexcom G6 Continuous Glucose Monitoring System was provided. Reviewed the Dexcom "Getting Started Guide" with patient and he has a written copy for home use.  Instructed patient on how to enter blood sugar using Dexcom teo on phone  Patient was allowed to practice and time allowed to ask questions. Patient verbalizes understanding to bring Dexcom  to all Endocrinology/Diabetes Education appts to be uploaded. Pt will notify Endocrinology if glucose levels are above 250 mg/dL consistently or if episode of glucose less than 70 mg/dL presents.  Pt verbalizes understanding.         High alert set at 300 mg/dL (no repeat reminder per patient request)  Low alert set at 75 mg/dL (repeat every 30 minutes if remains low)  High rising alert: off  Low dropping alert: off     An appropriate site was selected and prepared. Patient inserted sensor into right abdomen. Sensor will be changed every 10 days.  All questions answered.  Pt encouraged to contact Endocrinology department with any further questions or concerns.    Goals  Patient has selected/evaluated goals during today's session: Yes, selected  Monitoring: In Progress(Upgrading from Dexcom G5 to " Dexcom G6 today)       Diabetes Care Plan/Intervention  Education Plan/Intervention:   1.  Patient to call if glucose levels consistently > 250 mg/dL or if 2 episodes of glucose less than 70 mg/dL occurs.    2.  Avoid over calibrating Dexcom G6 (no more than 3-4 times daily)--patient educated that Dexcom G6 does not require calibration but patient tends to continue to check glucose with glucometer 6 times daily even when wearing CGMS device.  Patient verbalizes to calibrate when glucose levels are steady.    3.  Endocrinology follow up scheduled for October 2019.  Patient to call if he feels he needs to be seen sooner.         Today's Self-Management Care Plan was developed with the patient's input and is based on barriers identified during today's assessment.    The long and short-term goals in the care plan were written with the patient/caregiver's input. The patient has agreed to work toward these goals to improve his overall diabetes control.      The patient received a copy of today's self-management plan and verbalized understanding of the care plan, goals, and all of today's instructions.      The patient was encouraged to communicate with his physician and care team regarding his condition(s) and treatment.  I provided the patient with my contact information today and encouraged him to contact me via phone or patient portal as needed.     Education Units of Time   Time Spent: 45 min

## 2019-08-12 ENCOUNTER — LAB VISIT (OUTPATIENT)
Dept: LAB | Facility: HOSPITAL | Age: 64
End: 2019-08-12
Attending: INTERNAL MEDICINE
Payer: COMMERCIAL

## 2019-08-12 DIAGNOSIS — N40.0 BENIGN ENLARGEMENT OF PROSTATE: ICD-10-CM

## 2019-08-12 DIAGNOSIS — N18.30 CHRONIC KIDNEY DISEASE, STAGE III (MODERATE): ICD-10-CM

## 2019-08-12 DIAGNOSIS — N18.2 CHRONIC KIDNEY DISEASE, STAGE II (MILD): ICD-10-CM

## 2019-08-12 DIAGNOSIS — N18.9 ANEMIA OF CHRONIC RENAL FAILURE: Primary | ICD-10-CM

## 2019-08-12 DIAGNOSIS — I12.9 PARENCHYMAL RENAL HYPERTENSION: ICD-10-CM

## 2019-08-12 DIAGNOSIS — R80.9 PROTEINURIA: ICD-10-CM

## 2019-08-12 DIAGNOSIS — E11.9 DIABETES MELLITUS WITHOUT COMPLICATION: ICD-10-CM

## 2019-08-12 DIAGNOSIS — D63.1 ANEMIA OF CHRONIC RENAL FAILURE: Primary | ICD-10-CM

## 2019-08-12 LAB
ALBUMIN SERPL BCP-MCNC: 4.1 G/DL (ref 3.5–5.2)
ALP SERPL-CCNC: 73 U/L (ref 55–135)
ALT SERPL W/O P-5'-P-CCNC: 23 U/L (ref 10–44)
ANION GAP SERPL CALC-SCNC: 9 MMOL/L (ref 8–16)
AST SERPL-CCNC: 21 U/L (ref 10–40)
BASOPHILS # BLD AUTO: 0.04 K/UL (ref 0–0.2)
BASOPHILS NFR BLD: 0.5 % (ref 0–1.9)
BILIRUB SERPL-MCNC: 0.5 MG/DL (ref 0.1–1)
BUN SERPL-MCNC: 15 MG/DL (ref 8–23)
CALCIUM SERPL-MCNC: 9.3 MG/DL (ref 8.7–10.5)
CHLORIDE SERPL-SCNC: 102 MMOL/L (ref 95–110)
CO2 SERPL-SCNC: 24 MMOL/L (ref 23–29)
CREAT SERPL-MCNC: 1.2 MG/DL (ref 0.5–1.4)
DIFFERENTIAL METHOD: ABNORMAL
EOSINOPHIL # BLD AUTO: 0.3 K/UL (ref 0–0.5)
EOSINOPHIL NFR BLD: 3.4 % (ref 0–8)
ERYTHROCYTE [DISTWIDTH] IN BLOOD BY AUTOMATED COUNT: 12.6 % (ref 11.5–14.5)
EST. GFR  (AFRICAN AMERICAN): >60 ML/MIN/1.73 M^2
EST. GFR  (NON AFRICAN AMERICAN): >60 ML/MIN/1.73 M^2
FERRITIN SERPL-MCNC: 27 NG/ML (ref 20–300)
GLUCOSE SERPL-MCNC: 225 MG/DL (ref 70–110)
HCT VFR BLD AUTO: 36.6 % (ref 40–54)
HGB BLD-MCNC: 12.3 G/DL (ref 14–18)
IMM GRANULOCYTES # BLD AUTO: 0.02 K/UL (ref 0–0.04)
IMM GRANULOCYTES NFR BLD AUTO: 0.3 % (ref 0–0.5)
IRON SERPL-MCNC: 59 UG/DL (ref 45–160)
LYMPHOCYTES # BLD AUTO: 1.5 K/UL (ref 1–4.8)
LYMPHOCYTES NFR BLD: 19.6 % (ref 18–48)
MCH RBC QN AUTO: 32.5 PG (ref 27–31)
MCHC RBC AUTO-ENTMCNC: 33.6 G/DL (ref 32–36)
MCV RBC AUTO: 97 FL (ref 82–98)
MONOCYTES # BLD AUTO: 0.7 K/UL (ref 0.3–1)
MONOCYTES NFR BLD: 8.8 % (ref 4–15)
NEUTROPHILS # BLD AUTO: 5.2 K/UL (ref 1.8–7.7)
NEUTROPHILS NFR BLD: 67.4 % (ref 38–73)
NRBC BLD-RTO: 0 /100 WBC
PLATELET # BLD AUTO: 228 K/UL (ref 150–350)
PMV BLD AUTO: 10.6 FL (ref 9.2–12.9)
POTASSIUM SERPL-SCNC: 4.8 MMOL/L (ref 3.5–5.1)
PROT SERPL-MCNC: 7 G/DL (ref 6–8.4)
RBC # BLD AUTO: 3.79 M/UL (ref 4.6–6.2)
SATURATED IRON: 14 % (ref 20–50)
SODIUM SERPL-SCNC: 135 MMOL/L (ref 136–145)
TOTAL IRON BINDING CAPACITY: 420 UG/DL (ref 250–450)
TRANSFERRIN SERPL-MCNC: 284 MG/DL (ref 200–375)
WBC # BLD AUTO: 7.72 K/UL (ref 3.9–12.7)

## 2019-08-12 PROCEDURE — 80053 COMPREHEN METABOLIC PANEL: CPT

## 2019-08-12 PROCEDURE — 36415 COLL VENOUS BLD VENIPUNCTURE: CPT | Mod: PO

## 2019-08-12 PROCEDURE — 82728 ASSAY OF FERRITIN: CPT

## 2019-08-12 PROCEDURE — 85025 COMPLETE CBC W/AUTO DIFF WBC: CPT

## 2019-08-12 PROCEDURE — 83540 ASSAY OF IRON: CPT

## 2019-08-22 ENCOUNTER — PATIENT MESSAGE (OUTPATIENT)
Dept: ENDOCRINOLOGY | Facility: CLINIC | Age: 64
End: 2019-08-22

## 2019-08-22 ENCOUNTER — LAB VISIT (OUTPATIENT)
Dept: LAB | Facility: HOSPITAL | Age: 64
End: 2019-08-22
Attending: NURSE PRACTITIONER
Payer: COMMERCIAL

## 2019-08-22 DIAGNOSIS — E03.8 SUBCLINICAL HYPOTHYROIDISM: ICD-10-CM

## 2019-08-22 LAB — TSH SERPL DL<=0.005 MIU/L-ACNC: 2.83 UIU/ML (ref 0.4–4)

## 2019-08-22 PROCEDURE — 84443 ASSAY THYROID STIM HORMONE: CPT

## 2019-08-22 PROCEDURE — 36415 COLL VENOUS BLD VENIPUNCTURE: CPT | Mod: PO

## 2019-09-16 ENCOUNTER — TELEPHONE (OUTPATIENT)
Dept: ENDOCRINOLOGY | Facility: CLINIC | Age: 64
End: 2019-09-16

## 2019-09-16 NOTE — TELEPHONE ENCOUNTER
----- Message from Rafal Cuevas sent at 9/16/2019  1:01 PM CDT -----  Contact: Addie with Diabetes management   Type: Needs Medical Advice    Who Called:  Addie    Best Call Back Number: [phone] ext [phone]  Additional Information: States that [employer] has called them saying that [employer] is sent over 2 request, but she is not sure for what. Addie states she is calling to try to figure out why the claims keep getting denied. Please call to advise.

## 2019-09-16 NOTE — TELEPHONE ENCOUNTER
Left voicemail with GUERRERO Real that we have not received any paperwork or notifications about pt from [facility] so we are unaware of what is needed.  DMS advised to contact [facility] to determine what is needed

## 2019-11-07 ENCOUNTER — LAB VISIT (OUTPATIENT)
Dept: LAB | Facility: HOSPITAL | Age: 64
End: 2019-11-07
Attending: FAMILY MEDICINE
Payer: COMMERCIAL

## 2019-11-07 DIAGNOSIS — N18.30 CHRONIC KIDNEY DISEASE, STAGE III (MODERATE): ICD-10-CM

## 2019-11-07 DIAGNOSIS — Z95.1 POSTSURGICAL AORTOCORONARY BYPASS STATUS: ICD-10-CM

## 2019-11-07 DIAGNOSIS — I20.89 ANGINAL CHEST PAIN AT REST: ICD-10-CM

## 2019-11-07 DIAGNOSIS — E10.42 TYPE 1 DIABETES MELLITUS WITH DIABETIC POLYNEUROPATHY: ICD-10-CM

## 2019-11-07 DIAGNOSIS — I25.10 CORONARY ATHEROSCLEROSIS OF NATIVE CORONARY ARTERY: Primary | ICD-10-CM

## 2019-11-07 DIAGNOSIS — I45.2 RIGHT BUNDLE BRANCH BLOCK WITH LEFT BUNDLE BRANCH BLOCK: ICD-10-CM

## 2019-11-07 DIAGNOSIS — E10.9 DIABETES MELLITUS TYPE I: ICD-10-CM

## 2019-11-07 DIAGNOSIS — E78.2 MIXED HYPERLIPIDEMIA: ICD-10-CM

## 2019-11-07 DIAGNOSIS — Z79.899 ENCOUNTER FOR LONG-TERM (CURRENT) USE OF OTHER MEDICATIONS: ICD-10-CM

## 2019-11-07 LAB
ALBUMIN SERPL BCP-MCNC: 4.1 G/DL (ref 3.5–5.2)
ALP SERPL-CCNC: 79 U/L (ref 55–135)
ALT SERPL W/O P-5'-P-CCNC: 20 U/L (ref 10–44)
ANION GAP SERPL CALC-SCNC: 8 MMOL/L (ref 8–16)
AST SERPL-CCNC: 22 U/L (ref 10–40)
BASOPHILS # BLD AUTO: 0.03 K/UL (ref 0–0.2)
BASOPHILS NFR BLD: 0.5 % (ref 0–1.9)
BILIRUB SERPL-MCNC: 0.7 MG/DL (ref 0.1–1)
BUN SERPL-MCNC: 18 MG/DL (ref 8–23)
CALCIUM SERPL-MCNC: 10.1 MG/DL (ref 8.7–10.5)
CHLORIDE SERPL-SCNC: 104 MMOL/L (ref 95–110)
CHOLEST SERPL-MCNC: 139 MG/DL (ref 120–199)
CHOLEST/HDLC SERPL: 2.4 {RATIO} (ref 2–5)
CO2 SERPL-SCNC: 28 MMOL/L (ref 23–29)
CREAT SERPL-MCNC: 1.6 MG/DL (ref 0.5–1.4)
DIFFERENTIAL METHOD: ABNORMAL
EOSINOPHIL # BLD AUTO: 0.2 K/UL (ref 0–0.5)
EOSINOPHIL NFR BLD: 2.6 % (ref 0–8)
ERYTHROCYTE [DISTWIDTH] IN BLOOD BY AUTOMATED COUNT: 12.7 % (ref 11.5–14.5)
EST. GFR  (AFRICAN AMERICAN): 51.1 ML/MIN/1.73 M^2
EST. GFR  (NON AFRICAN AMERICAN): 44.2 ML/MIN/1.73 M^2
ESTIMATED AVG GLUCOSE: 200 MG/DL (ref 68–131)
GLUCOSE SERPL-MCNC: 174 MG/DL (ref 70–110)
HBA1C MFR BLD HPLC: 8.6 % (ref 4–5.6)
HCT VFR BLD AUTO: 40.5 % (ref 40–54)
HDLC SERPL-MCNC: 59 MG/DL (ref 40–75)
HDLC SERPL: 42.4 % (ref 20–50)
HGB BLD-MCNC: 13.3 G/DL (ref 14–18)
IMM GRANULOCYTES # BLD AUTO: 0.01 K/UL (ref 0–0.04)
IMM GRANULOCYTES NFR BLD AUTO: 0.2 % (ref 0–0.5)
LDLC SERPL CALC-MCNC: 60 MG/DL (ref 63–159)
LYMPHOCYTES # BLD AUTO: 1.7 K/UL (ref 1–4.8)
LYMPHOCYTES NFR BLD: 28.7 % (ref 18–48)
MCH RBC QN AUTO: 31.1 PG (ref 27–31)
MCHC RBC AUTO-ENTMCNC: 32.8 G/DL (ref 32–36)
MCV RBC AUTO: 95 FL (ref 82–98)
MONOCYTES # BLD AUTO: 0.5 K/UL (ref 0.3–1)
MONOCYTES NFR BLD: 9.2 % (ref 4–15)
NEUTROPHILS # BLD AUTO: 3.4 K/UL (ref 1.8–7.7)
NEUTROPHILS NFR BLD: 58.8 % (ref 38–73)
NONHDLC SERPL-MCNC: 80 MG/DL
NRBC BLD-RTO: 0 /100 WBC
PLATELET # BLD AUTO: 282 K/UL (ref 150–350)
PMV BLD AUTO: 10.6 FL (ref 9.2–12.9)
POTASSIUM SERPL-SCNC: 4.5 MMOL/L (ref 3.5–5.1)
PROT SERPL-MCNC: 7.3 G/DL (ref 6–8.4)
RBC # BLD AUTO: 4.27 M/UL (ref 4.6–6.2)
SODIUM SERPL-SCNC: 140 MMOL/L (ref 136–145)
TRIGL SERPL-MCNC: 100 MG/DL (ref 30–150)
TSH SERPL DL<=0.005 MIU/L-ACNC: 2.88 UIU/ML (ref 0.4–4)
WBC # BLD AUTO: 5.79 K/UL (ref 3.9–12.7)

## 2019-11-07 PROCEDURE — 85025 COMPLETE CBC W/AUTO DIFF WBC: CPT

## 2019-11-07 PROCEDURE — 84443 ASSAY THYROID STIM HORMONE: CPT

## 2019-11-07 PROCEDURE — 80061 LIPID PANEL: CPT

## 2019-11-07 PROCEDURE — 36415 COLL VENOUS BLD VENIPUNCTURE: CPT | Mod: PO

## 2019-11-07 PROCEDURE — 83036 HEMOGLOBIN GLYCOSYLATED A1C: CPT

## 2019-11-07 PROCEDURE — 80053 COMPREHEN METABOLIC PANEL: CPT

## 2019-11-27 RX ORDER — INSULIN LISPRO 100 [IU]/ML
INJECTION, SOLUTION INTRAVENOUS; SUBCUTANEOUS
Qty: 50 ML | Refills: 0 | Status: SHIPPED | OUTPATIENT
Start: 2019-11-27 | End: 2020-02-03 | Stop reason: SDUPTHER

## 2019-12-20 LAB
LEFT EYE DM RETINOPATHY: POSITIVE
RIGHT EYE DM RETINOPATHY: NEGATIVE

## 2020-01-06 ENCOUNTER — PATIENT OUTREACH (OUTPATIENT)
Dept: ADMINISTRATIVE | Facility: HOSPITAL | Age: 65
End: 2020-01-06

## 2020-01-07 ENCOUNTER — PATIENT OUTREACH (OUTPATIENT)
Dept: ADMINISTRATIVE | Facility: HOSPITAL | Age: 65
End: 2020-01-07

## 2020-01-15 ENCOUNTER — PATIENT MESSAGE (OUTPATIENT)
Dept: ENDOCRINOLOGY | Facility: CLINIC | Age: 65
End: 2020-01-15

## 2020-01-15 DIAGNOSIS — E10.22 TYPE 1 DIABETES MELLITUS WITH STAGE 3 CHRONIC KIDNEY DISEASE: Primary | ICD-10-CM

## 2020-01-15 DIAGNOSIS — N18.30 TYPE 1 DIABETES MELLITUS WITH STAGE 3 CHRONIC KIDNEY DISEASE: Primary | ICD-10-CM

## 2020-01-17 ENCOUNTER — LAB VISIT (OUTPATIENT)
Dept: LAB | Facility: HOSPITAL | Age: 65
End: 2020-01-17
Attending: NURSE PRACTITIONER
Payer: COMMERCIAL

## 2020-01-17 DIAGNOSIS — N18.30 TYPE 1 DIABETES MELLITUS WITH STAGE 3 CHRONIC KIDNEY DISEASE: ICD-10-CM

## 2020-01-17 DIAGNOSIS — E10.22 TYPE 1 DIABETES MELLITUS WITH STAGE 3 CHRONIC KIDNEY DISEASE: ICD-10-CM

## 2020-01-17 PROCEDURE — 83036 HEMOGLOBIN GLYCOSYLATED A1C: CPT

## 2020-01-17 PROCEDURE — 36415 COLL VENOUS BLD VENIPUNCTURE: CPT | Mod: PO

## 2020-01-18 LAB
ESTIMATED AVG GLUCOSE: 203 MG/DL (ref 68–131)
HBA1C MFR BLD HPLC: 8.7 % (ref 4–5.6)

## 2020-01-19 ENCOUNTER — PATIENT MESSAGE (OUTPATIENT)
Dept: ENDOCRINOLOGY | Facility: CLINIC | Age: 65
End: 2020-01-19

## 2020-01-20 ENCOUNTER — OFFICE VISIT (OUTPATIENT)
Dept: FAMILY MEDICINE | Facility: CLINIC | Age: 65
End: 2020-01-20
Payer: COMMERCIAL

## 2020-01-20 VITALS
BODY MASS INDEX: 25.56 KG/M2 | TEMPERATURE: 99 F | WEIGHT: 178.56 LBS | HEIGHT: 70 IN | OXYGEN SATURATION: 98 % | SYSTOLIC BLOOD PRESSURE: 100 MMHG | HEART RATE: 74 BPM | DIASTOLIC BLOOD PRESSURE: 60 MMHG

## 2020-01-20 DIAGNOSIS — E10.319 TYPE 1 DIABETES MELLITUS WITH RETINOPATHY, MACULAR EDEMA PRESENCE UNSPECIFIED, UNSPECIFIED LATERALITY, UNSPECIFIED RETINOPATHY SEVERITY: ICD-10-CM

## 2020-01-20 DIAGNOSIS — H33.22 LEFT RETINAL DETACHMENT: Chronic | ICD-10-CM

## 2020-01-20 DIAGNOSIS — G60.3 IDIOPATHIC PROGRESSIVE NEUROPATHY: ICD-10-CM

## 2020-01-20 DIAGNOSIS — Z12.5 PROSTATE CANCER SCREENING: Primary | ICD-10-CM

## 2020-01-20 DIAGNOSIS — N18.30 CKD (CHRONIC KIDNEY DISEASE) STAGE 3, GFR 30-59 ML/MIN: Chronic | ICD-10-CM

## 2020-01-20 DIAGNOSIS — E03.8 SUBCLINICAL HYPOTHYROIDISM: ICD-10-CM

## 2020-01-20 PROCEDURE — 99214 PR OFFICE/OUTPT VISIT, EST, LEVL IV, 30-39 MIN: ICD-10-PCS | Mod: S$GLB,,, | Performed by: FAMILY MEDICINE

## 2020-01-20 PROCEDURE — 1159F MED LIST DOCD IN RCRD: CPT | Mod: S$GLB,,, | Performed by: FAMILY MEDICINE

## 2020-01-20 PROCEDURE — 1126F PR PAIN SEVERITY QUANTIFIED, NO PAIN PRESENT: ICD-10-PCS | Mod: S$GLB,,, | Performed by: FAMILY MEDICINE

## 2020-01-20 PROCEDURE — 3078F PR MOST RECENT DIASTOLIC BLOOD PRESSURE < 80 MM HG: ICD-10-PCS | Mod: CPTII,S$GLB,, | Performed by: FAMILY MEDICINE

## 2020-01-20 PROCEDURE — 99999 PR PBB SHADOW E&M-EST. PATIENT-LVL III: ICD-10-PCS | Mod: PBBFAC,,, | Performed by: FAMILY MEDICINE

## 2020-01-20 PROCEDURE — 1159F PR MEDICATION LIST DOCUMENTED IN MEDICAL RECORD: ICD-10-PCS | Mod: S$GLB,,, | Performed by: FAMILY MEDICINE

## 2020-01-20 PROCEDURE — 3078F DIAST BP <80 MM HG: CPT | Mod: CPTII,S$GLB,, | Performed by: FAMILY MEDICINE

## 2020-01-20 PROCEDURE — 1101F PT FALLS ASSESS-DOCD LE1/YR: CPT | Mod: CPTII,S$GLB,, | Performed by: FAMILY MEDICINE

## 2020-01-20 PROCEDURE — 99214 OFFICE O/P EST MOD 30 MIN: CPT | Mod: S$GLB,,, | Performed by: FAMILY MEDICINE

## 2020-01-20 PROCEDURE — 99999 PR PBB SHADOW E&M-EST. PATIENT-LVL III: CPT | Mod: PBBFAC,,, | Performed by: FAMILY MEDICINE

## 2020-01-20 PROCEDURE — 3074F PR MOST RECENT SYSTOLIC BLOOD PRESSURE < 130 MM HG: ICD-10-PCS | Mod: CPTII,S$GLB,, | Performed by: FAMILY MEDICINE

## 2020-01-20 PROCEDURE — 3074F SYST BP LT 130 MM HG: CPT | Mod: CPTII,S$GLB,, | Performed by: FAMILY MEDICINE

## 2020-01-20 PROCEDURE — 1126F AMNT PAIN NOTED NONE PRSNT: CPT | Mod: S$GLB,,, | Performed by: FAMILY MEDICINE

## 2020-01-20 PROCEDURE — 1101F PR PT FALLS ASSESS DOC 0-1 FALLS W/OUT INJ PAST YR: ICD-10-PCS | Mod: CPTII,S$GLB,, | Performed by: FAMILY MEDICINE

## 2020-01-20 NOTE — TELEPHONE ENCOUNTER
Last office notes faxed to Diabetes management supplies per Patient's request. Pt. Notified via My Chart

## 2020-01-20 NOTE — PATIENT INSTRUCTIONS
Low-Salt Diet  This diet removes foods that are high in salt. It also limits the amount of salt you use when cooking. It is most often used for people with high blood pressure, edema (fluid retention), and kidney, liver, or heart disease.  Table salt contains the mineral sodium. Your body needs sodium to work normally. But too much sodium can make your health problems worse. Your healthcare provider is recommending a low-salt (also called low-sodium) diet for you. Your total daily allowance of salt is 1,500 to 2,300 milligrams (mg). It is less than 1 teaspoon of table salt. This means you can have only about 500 to 700 mg of sodium at each meal. People with certain health problems should limit salt intake to the lower end of the recommended range.    When you cook, dont add much salt. If you can cook without using salt, even better. Dont add salt to your food at the table.  When shopping, read food labels. Salt is often called sodium on the label. Choose foods that are salt-free, low salt, or very low salt. Note that foods with reduced salt may not lower your salt intake enough.    Beans, potatoes, and pasta  Ok: Dry beans, split peas, lentils, potatoes, rice, macaroni, pasta, spaghetti without added salt  Avoid: Potato chips, tortilla chips, and similar products  Breads and cereals  Ok: Low-sodium breads, rolls, cereals, and cakes; low-salt crackers, matzo crackers  Avoid: Salted crackers, pretzels, popcorn, Croatian toast, pancakes, muffins  Dairy  Ok: Milk, chocolate milk, hot chocolate mix, low-salt cheeses, and yogurt  Avoid: Processed cheese and cheese spreads; Roquefort, Camembert, and cottage cheese; buttermilk, instant breakfast drink  Desserts  Ok: Ice cream, frozen yogurt, juice bars, gelatin, cookies and pies, sugar, honey, jelly, hard candy  Avoid: Most pies, cakes and cookies prepared or processed with salt; instant pudding  Drinks  Ok: Tea, coffee, fizzy (carbonated) drinks, juices  Avoid: Flavored  coffees, electrolyte replacement drinks, sports drinks  Meats  Ok: All fresh meat, fish, poultry, low-salt tuna, eggs, egg substitute  Avoid: Smoked, pickled, brine-cured, or salted meats and fish. This includes hunt, chipped beef, corned beef, hot dogs, deli meats, ham, kosher meats, salt pork, sausage, canned tuna, salted codfish, smoked salmon, herring, sardines, or anchovies.  Seasonings and spices  Ok: Most seasonings are okay. Good substitutes for salt include: fresh herb blends, hot sauce, lemon, garlic, jerez, vinegar, dry mustard, parsley, cilantro, horseradish, tomato paste, regular margarine, mayonnaise, unsalted butter, cream cheese, vegetable oil, cream, low-salt salad dressing and gravy.  Avoid: Regular ketchup, relishes, pickles, soy sauce, teriyaki sauce, Worcestershire sauce, BBQ sauce, tartar sauce, meat tenderizer, chili sauce, regular gravy, regular salad dressing, salted butter  Soups  Ok: Low-salt soups and broths made with allowed foods  Avoid: Bouillon cubes, soups with smoked or salted meats, regular soup and broth  Vegetables  Ok: Most vegetables are okay; also low-salt tomato and vegetable juices  Avoid: Sauerkraut and other brine-soaked vegetables; pickles and other pickled vegetables; tomato juice, olives  Date Last Reviewed: 8/1/2016 © 2000-2017 [employer]. [address] [place] [address]. All rights reserved. This information is not intended as a substitute for professional medical care. Always follow your healthcare professional's instructions.        Cataracts: Your Evaluation    An evaluation helps your eye doctor learn more about your vision problems. He or she can tell if cataracts are the cause. This evaluation includes a medical history, vision tests, and an eye exam. What your doctor learns will help him or her find the best treatment options for you.  Medical history  You will be asked questions about your vision and any other eye problems you may  have. Your eye doctor will also ask about health problems, such as diabetes. Be sure to tell your doctor about any medicines, vitamins, herbs, or other supplements you are taking.  Vision tests  Your eye doctor will do a few tests to check your vision. You will read from an eye chart. And your vision will be tested under different lighting. If you wear eyeglasses or contact lenses, bring them. Your eye doctor can check your prescription.  An eye exam  In most cases, you will be given eye drops to widen (dilate) your pupils before your exam. During the exam, the doctor looks inside your eye using special equipment. This includes a microscope with a bright light (a slit lamp). Your doctor will also use a lighted, handheld tool (an ophthalmoscope).  Discussing treatment options  After your eye exam, you and your eye doctor will talk about your treatment options. A new eyeglass or contact lens prescription may help your vision for a while. But surgery is the only way to remove a cataract and replace your cloudy lens. If you can still do your daily activities, you may wait to have your cataract removed. You and your eye doctor will decide whats best for you.  Measuring your eye  If you decide to have cataract surgery, the length of your eye and curvature of your cornea will be measured. This information helps your doctor choose a new lens to replace your cloudy lens. Measurement is done using special tools. These may include an A-Scan, laser interferometry, or a keratometer.   Date Last Reviewed: 6/15/2015  © 4739-9444 [employer]. [address] [place] [address], [place] [address]. All rights reserved. This information is not intended as a substitute for professional medical care. Always follow your healthcare professional's instructions.        What Are Cataracts?    A clear lens in the eye focuses light. This lets the eye see images sharply. With age, the lens slowly becomes cloudy. The cloudy lens is a  cataract. A cataract scatters light and makes it hard for the eye to focus. Cataracts often form in both eyes. But one lens may cloud faster than the other.  The aging of your lens  Your lens may cloud so slowly that you don`t notice any vision changes at first. But as the cataract gets worse, the eye has a harder time focusing. In early stages, glasses may help you see better. As the lens gets more cloudy, your doctor may recommend surgery to restore your vision.  A clear lens allows your eye to bring objects sharply into focus.  A mild cataract may slightly blur your vision.  A dense cataract can severely blur your vision.  Date Last Reviewed: 6/14/2015  © 0910-9884 [employer]. [address] [place] [address], [place] [address]. All rights reserved. This information is not intended as a substitute for professional medical care. Always follow your healthcare professional's instructions.        A Sample Walking Program  Experts recommend walking briskly on most days. Aim for a target of 30 minutes on most days, or 150 or more minutes a week. Walking programs can help you reach this goal by slowly increasing the frequency and the amount of  time you walk. Try this walking program:    First week  · Walk 3 times a week.  · Walk for 5 minutes each time.  Second week  · Walk 3 times a week.  · Walk for 10 minutes each time.  Third week  · Walk 3 times a week.  · Walk for 13 minutes each time.  Fourth week  · Walk 3 times a week.  · Walk for 15 minutes each time.  Fifth week  · Walk 4 times a week.  · Walk for 15 minutes each time.  Sixth week and beyond  Gradually increase your minutes of walking each time, and your number of times each week, until you reach 30 minutes, 5-7 days of the week.  Tips for getting the most from your walking program  · Walk briskly. If you can sing, speed up. If you cant talk easily, slow down.  · Choose good walking shoes with padded soles and good arch support.  · Dont use hand or  ankle weights. They can cause injuries.  · Walk indoors if the weather is bad. Use a treadmill or walk inside a shopping mall  Before you start walking, check with your healthcare provider if you are new to exercise, over 40, overweight, or a smoker. Also check with your provider  if you have heart disease, high blood pressure, diabetes, arthritis, asthma, or any other health problem that concerns you. Your provider can help you get started and help you stay safe.   Date Last Reviewed: 8/13/2015  © 3429-1582 [employer]. [address] [place] [address]. All rights reserved. This information is not intended as a substitute for professional medical care. Always follow your healthcare professional's instructions.        Exercises to Strengthen Your Lower Back  Strong lower back and abdominal muscles work together to support your spine. The exercises below will help strengthen the lower back. It is important that you begin exercising slowly and increase levels gradually.  Always begin any exercise program with stretching. If you feel pain while doing any of these exercises, stop and talk to your doctor about a more specific exercise program that better suits your condition.   Low back stretch  The point of stretching is to make you more flexible and increase your range of motion. Stretch only as much as you are able. Stretch slowly. Do not push your stretch to the limit. If at any point you feel pain while stretching, this is your (temporary) limit.  · Lie on your back with your knees bent and both feet on the ground.  · Slowly raise your left knee to your chest as you flatten your lower back against the floor. Hold for 5 seconds.  · Relax and repeat the exercise with your right knee.  · Do 10 of these exercises for each leg.  · Repeat hugging both knees to your chest at the same time.  Building lower back strength  Start your exercise routine with 10 to 30 minutes a day, 1 to 3 times a  day.  Initial exercises  Lying on your back:  1. Ankle pumps: Move your foot up and down, towards your head, and then away. Repeat 10 times with each foot.  2. Heel slides: Slowly bend your knee, drawing the heel of your foot towards you. Then slide your heel/foot from you, straightening your knee. Do not lift your foot off the floor (this is not a leg lift).  3. Abdominal contraction: Bend your knees and put your hands on your stomach. Tighten your stomach muscles. Hold for 5 seconds, then relax. Repeat 10 times.  4. Straight leg raise: Bend one leg at the knee and keep the other leg straight. Tighten your stomach muscles. Slowly lift your straight leg 6 to 12 inches off the floor and hold for up to 5 seconds. Repeat 10 times on each side.  Standin. Wall squats: Stand with your back against the wall. Move your feet about 12 inches away from the wall. Tighten your stomach muscles, and slowly bend your knees until they are at about a 45 degree angle. Do not go down too far. Hold about 5 seconds. Then slowly return to your starting position. Repeat 10 times.  2. Heel raises: Stand facing the wall. Slowly raise the heels of your feet up and down, while keeping your toes on the floor. If you have trouble balancing, you can touch the wall with your hands. Repeat 10 times.  More advanced exercises  When you feel comfortable enough, try these exercises.  1. Kneeling lumbar extension: Begin on your hands and knees. At the same time, raise and straighten your right arm and left leg until they are parallel to the ground. Hold for 2 seconds and come back slowly to a starting position. Repeat with left arm and right leg, alternating 10 times.  2. Prone lumbar extension: Lie face down, arms extended overhead, palms on the floor. At the same time, raise your right arm and left leg as high as comfortably possible. Hold for 10 seconds and slowly return to start. Repeat with left arm and right leg, alternating 10 times.  Gradually build up to 20 times. (Advanced: Repeat this exercise raising both arms and both legs a few inches off the floor at the same time. Hold for 5 seconds and release.)  3. Pelvic tilt: Lie on the floor on your back with your knees bent at 90 degrees. Your feet should be flat on the floor. Inhale, exhale, then slowly contract your abdominal muscles bringing your navel toward your spine. Let your pelvis rock back until your lower back is flat on the floor. Hold for 10 seconds while breathing smoothly.  4. Abdominal crunch: Perform a pelvic tilt (above) flattening your lower back against the floor. Holding the tension in your abdominal muscles, take another breath and raise your shoulder blades off the ground (this is not a full sit-up). Keep your head in line with your body (dont bend your neck forward). Hold for 2 seconds, then slowly lower.  Date Last Reviewed: 6/1/2016  © 2404-3936 [employer]. [address] [place] [address]. All rights reserved. This information is not intended as a substitute for professional medical care. Always follow your healthcare professional's instructions.        Back Exercises: Bridge  The bridge exercise strengthens your abdominal, buttock, and hamstring muscles. This helps keep your back stable and aligned when you walk.  · Lie on the floor with your back and palms flat. Bend your knees. Keep your feet flat on the floor.  · Contract your abdominal and buttock muscles. Slowly lift your buttocks off the floor until there is a straight line from your knees to your shoulders.  · Hold for 5 to 15  seconds. Repeat 5 to10 times.    Date Last Reviewed: 8/31/2015  © 3193-0924 [employer]. [address] [place] [address]. All rights reserved. This information is not intended as a substitute for professional medical care. Always follow your healthcare professional's instructions.        Back Exercises: Leg Pull    To start, lie on your back  with your knees bent and feet flat on the floor. Dont press your neck or lower back to the floor. Breathe deeply. You should feel comfortable and relaxed in this position.  · Pull one knee to your chest.  · Hold for 30 to 60 seconds. Return to starting position.  · Repeat 2 times.  · Switch legs.  · For a double leg pull, pull both legs to your chest at the same time. Repeat 2 times.  For your safety, check with your healthcare provider before starting an exercise program.   Date Last Reviewed: 8/16/2015  © 4898-9925 [employer]. [address] [place] [address]. All rights reserved. This information is not intended as a substitute for professional medical care. Always follow your healthcare professional's instructions.        Back Exercises: Knee Lift         To start, lie on your back with your knees bent and feet flat on the floor. Dont press your neck or lower back to the floor. Breathe deeply. You should feel comfortable and relaxed in this position:  · Start by tightening your abdominal muscles.  · Lift one bent knee off the floor 2 to 4 inches.  · Hold for 10 seconds. Return to start position.  · Repeat 3 times.  · Switch legs.  Date Last Reviewed: 8/16/2015 © 2000-2017 [employer]. [address] [place] [address]. All rights reserved. This information is not intended as a substitute for professional medical care. Always follow your healthcare professional's instructions.        Back Exercises: Partial Curl-Ups    To start, lie on your back with your knees bent and feet flat on the floor. Dont press your neck or lower back to the floor. Breathe deeply. You should feel comfortable and relaxed in this position:  · Cross your arms loosely.  · Tighten your abdomen and curl shelter up, keeping your head in line with your shoulders.  · Hold for 5 seconds. Uncurl to lie down.  · Repeat 2 sets of 10.   Date Last Reviewed: 8/16/2015 © 2000-2017 [employer]. [address] [place] [address]. All rights reserved. This information is not intended as a substitute for professional medical care. Always follow your healthcare professional's instructions.        Back Exercises: Seated Rotation    To start, sit in a chair with your feet flat on the floor. Shift your weight slightly forward to avoid rounding your back. Relax, and keep your ears, shoulders, and hips aligned:  · Fold your arms and elbows just below shoulder height.  · Turn from the waist with hips forward. Turn your head last. Do not push through the pain.  · Hold for a count of 10 to 30. Return to starting position.  · Repeat 3 to 5 times on one side. Then switch sides.  Date Last Reviewed: 10/11/2015  © 1239-8683 [employer]. [address] [place] [address]. All rights reserved. This information is not intended as a substitute for professional medical care. Always follow your healthcare professional's instructions.        Back Exercises: Side Stretch      To start, sit in a chair with your feet flat on the floor. Shift your weight slightly forward to avoid rounding your back. Relax. Keep your ears, shoulders, and hips aligned:  · Stretch your right arm overhead.  · Slowly bend to the left. Dont twist your torso. Stay within your pain limits.  · Hold for 20 seconds. Return to starting position.  · Repeat 2 to 5 times. Then, switch to the other side.  Date Last Reviewed: 10/13/2015  © 7863-1592 [employer]. [address] [place] [address]. All rights reserved. This information is not intended as a substitute for professional medical care. Always follow your healthcare professional's instructions.

## 2020-01-27 NOTE — PROGRESS NOTES
Subjective:       Patient ID: Narciso Becerra is a 64 y.o. male.    Chief Complaint: Annual Exam  HT has been controlled, no chest pains, no edema.red  DM I over 20 yrs, neuropathy, CKD III, and retinopathy.  He has been more active since he retired.    Hypertension   This is a chronic problem. The current episode started more than 1 year ago. The problem has been resolved since onset. The problem is controlled. Pertinent negatives include no chest pain, headaches, palpitations or shortness of breath. There are no associated agents to hypertension. Risk factors for coronary artery disease include sedentary lifestyle. The current treatment provides moderate improvement. Compliance problems include exercise.  Hypertensive end-organ damage includes kidney disease and PVD.     Review of Systems   Constitutional: Negative for fatigue and unexpected weight change.   Eyes: Positive for visual disturbance.   Respiratory: Negative for chest tightness and shortness of breath.    Cardiovascular: Negative for chest pain, palpitations and leg swelling.   Gastrointestinal: Negative for abdominal pain.   Genitourinary: Positive for frequency.   Musculoskeletal: Positive for myalgias. Negative for arthralgias.   Neurological: Positive for numbness. Negative for dizziness, syncope, light-headedness and headaches.       Patient Active Problem List   Diagnosis    CKD (chronic kidney disease) stage 3, GFR 30-59 ml/min    Coronary artery disease    Type 1 diabetes mellitus with diabetic polyneuropathy    Peripheral neuropathy    Insulin pump fitting or adjustment    Hypertension associated with diabetes    Hyperlipidemia due to type 1 diabetes mellitus    Angina of effort    Encounter for long-term (current) use of insulin    Adult situational stress disorder    Type 1 diabetes mellitus with retinopathy    Type 1 diabetes mellitus with stage 3 chronic kidney disease    Subclinical hypothyroidism    Left retinal  detachment       Objective:      Physical Exam   Constitutional: He is oriented to person, place, and time. He appears well-developed and well-nourished.   Cardiovascular: Normal rate, regular rhythm and normal heart sounds.   Pulses:       Dorsalis pedis pulses are 1+ on the right side, and 1+ on the left side.        Posterior tibial pulses are 1+ on the right side, and 1+ on the left side.   Pulmonary/Chest: Effort normal and breath sounds normal.   Musculoskeletal: He exhibits no edema.        Right foot: There is normal range of motion and no deformity.        Left foot: There is normal range of motion and no deformity.   Feet:   Right Foot:   Protective Sensation: 6 sites tested. 2 sites sensed.   Skin Integrity: Positive for callus. Negative for ulcer, blister or skin breakdown.   Left Foot:   Protective Sensation: 6 sites tested. 2 sites sensed.   Skin Integrity: Positive for callus. Negative for ulcer, blister or skin breakdown.   Neurological: He is alert and oriented to person, place, and time.   Skin: Skin is warm and dry.   Psychiatric: He has a normal mood and affect.   Nursing note and vitals reviewed.      Lab Results   Component Value Date    WBC 5.79 11/07/2019    HGB 13.3 (L) 11/07/2019    HCT 40.5 11/07/2019     11/07/2019    CHOL 139 11/07/2019    TRIG 100 11/07/2019    HDL 59 11/07/2019    ALT 20 11/07/2019    AST 22 11/07/2019     11/07/2019    K 4.5 11/07/2019     11/07/2019    CREATININE 1.6 (H) 11/07/2019    BUN 18 11/07/2019    CO2 28 11/07/2019    TSH 2.877 11/07/2019    PSA 0.46 06/15/2017    HGBA1C 8.7 (H) 01/17/2020     The 10-year ASCVD risk score ([place] LEWIS Jr., et al., 2013) is: 14%    Values used to calculate the score:      Age: 64 years      Sex: Male      Is Non- : No      Diabetic: Yes      Tobacco smoker: No      Systolic Blood Pressure: 100 mmHg      Is BP treated: Yes      HDL Cholesterol: 59 mg/dL      Total Cholesterol: 139  mg/dL    Assessment:       1. Prostate cancer screening    2. Type 1 diabetes mellitus with retinopathy, macular edema presence unspecified, unspecified laterality, unspecified retinopathy severity    3. Idiopathic progressive neuropathy    4. CKD (chronic kidney disease) stage 3, GFR 30-59 ml/min    5. Subclinical hypothyroidism    6. Left retinal detachment        Plan:       Prostate cancer screening  -     PSA, Screening; Future; Expected date: 01/20/2020    Type 1 diabetes mellitus with retinopathy, macular edema presence unspecified, unspecified laterality, unspecified retinopathy severity    Idiopathic progressive neuropathy    CKD (chronic kidney disease) stage 3, GFR 30-59 ml/min    Subclinical hypothyroidism    Left retinal detachment      Patient readiness: acceptance and barriers:readiness and household issues    During the course of the visit the patient was educated and counseled about the following:     Diabetes:  Suggested low cholesterol diet.  Encouraged aerobic exercise.  Discussed foot care.  Reminded to get yearly retinal exam.  Discussed ways to avoid symptomatic hypoglycemia.  Reminded to bring in blood sugar diary at next visit.  Follow up in 4 month or as needed.  Hypertension:   Medication: no change.  Screening for causes of secondary hypertension: GFR (chronic kidney disease).  Dietary sodium restriction.  Check blood pressures daily and record.  Follow up: 4 months and as needed.  :   increase fiber diet.    Goals: Diabetes: Maintain Hemoglobin A1C below 7 and Hypertension: Reduce Blood Pressure    Did patient meet goals/outcomes: Yes    The following self management tools provided: blood glucose log    Patient Instructions (the written plan) was given to the patient/family.     Time spent with patient: 30 minutes    Barriers to medications present (no )    Adverse reactions to current medications (no)    Over the counter medications reviewed (Yes)        40-minute visit. 20 minutes  spent counseling patient on diet, exercise, and weight loss.  This has been fully explained to the patient, who indicates understanding.    Discussed health maintenance guidelines appropriate for age.

## 2020-02-03 ENCOUNTER — TELEPHONE (OUTPATIENT)
Dept: ENDOCRINOLOGY | Facility: CLINIC | Age: 65
End: 2020-02-03

## 2020-02-03 ENCOUNTER — OFFICE VISIT (OUTPATIENT)
Dept: ENDOCRINOLOGY | Facility: CLINIC | Age: 65
End: 2020-02-03
Payer: MEDICARE

## 2020-02-03 VITALS
HEIGHT: 70 IN | WEIGHT: 181.69 LBS | BODY MASS INDEX: 26.01 KG/M2 | HEART RATE: 80 BPM | SYSTOLIC BLOOD PRESSURE: 128 MMHG | DIASTOLIC BLOOD PRESSURE: 70 MMHG

## 2020-02-03 DIAGNOSIS — E03.8 SUBCLINICAL HYPOTHYROIDISM: ICD-10-CM

## 2020-02-03 DIAGNOSIS — E10.42 TYPE 1 DIABETES MELLITUS WITH DIABETIC POLYNEUROPATHY: Primary | ICD-10-CM

## 2020-02-03 DIAGNOSIS — N18.30 TYPE 1 DIABETES MELLITUS WITH STAGE 3 CHRONIC KIDNEY DISEASE: ICD-10-CM

## 2020-02-03 DIAGNOSIS — I25.10 CORONARY ARTERY DISEASE DUE TO CALCIFIED CORONARY LESION: ICD-10-CM

## 2020-02-03 DIAGNOSIS — E10.319 TYPE 1 DIABETES MELLITUS WITH RETINOPATHY, MACULAR EDEMA PRESENCE UNSPECIFIED, UNSPECIFIED LATERALITY, UNSPECIFIED RETINOPATHY SEVERITY: ICD-10-CM

## 2020-02-03 DIAGNOSIS — E10.69 HYPERLIPIDEMIA DUE TO TYPE 1 DIABETES MELLITUS: ICD-10-CM

## 2020-02-03 DIAGNOSIS — E10.22 TYPE 1 DIABETES MELLITUS WITH STAGE 3 CHRONIC KIDNEY DISEASE: ICD-10-CM

## 2020-02-03 DIAGNOSIS — E78.5 HYPERLIPIDEMIA DUE TO TYPE 1 DIABETES MELLITUS: ICD-10-CM

## 2020-02-03 DIAGNOSIS — I15.2 HYPERTENSION ASSOCIATED WITH DIABETES: ICD-10-CM

## 2020-02-03 DIAGNOSIS — I25.84 CORONARY ARTERY DISEASE DUE TO CALCIFIED CORONARY LESION: ICD-10-CM

## 2020-02-03 DIAGNOSIS — E10.649 HYPOGLYCEMIA DUE TO TYPE 1 DIABETES MELLITUS: ICD-10-CM

## 2020-02-03 DIAGNOSIS — E11.59 HYPERTENSION ASSOCIATED WITH DIABETES: ICD-10-CM

## 2020-02-03 DIAGNOSIS — Z46.81 INSULIN PUMP FITTING OR ADJUSTMENT: ICD-10-CM

## 2020-02-03 PROCEDURE — 99999 PR PBB SHADOW E&M-EST. PATIENT-LVL V: CPT | Mod: PBBFAC,,, | Performed by: NURSE PRACTITIONER

## 2020-02-03 PROCEDURE — 99215 OFFICE O/P EST HI 40 MIN: CPT | Mod: PBBFAC,PO | Performed by: NURSE PRACTITIONER

## 2020-02-03 PROCEDURE — 99215 PR OFFICE/OUTPT VISIT, EST, LEVL V, 40-54 MIN: ICD-10-PCS | Mod: S$PBB,,, | Performed by: NURSE PRACTITIONER

## 2020-02-03 PROCEDURE — 99999 PR PBB SHADOW E&M-EST. PATIENT-LVL V: ICD-10-PCS | Mod: PBBFAC,,, | Performed by: NURSE PRACTITIONER

## 2020-02-03 PROCEDURE — 99215 OFFICE O/P EST HI 40 MIN: CPT | Mod: S$PBB,,, | Performed by: NURSE PRACTITIONER

## 2020-02-03 RX ORDER — LANCETS
EACH MISCELLANEOUS
Qty: 600 EACH | Refills: 3 | Status: SHIPPED | OUTPATIENT
Start: 2020-02-03

## 2020-02-03 RX ORDER — BLOOD SUGAR DIAGNOSTIC
STRIP MISCELLANEOUS
Qty: 600 STRIP | Refills: 3 | Status: SHIPPED | OUTPATIENT
Start: 2020-02-03

## 2020-02-03 RX ORDER — INSULIN LISPRO 100 [IU]/ML
INJECTION, SOLUTION INTRAVENOUS; SUBCUTANEOUS
Qty: 50 ML | Refills: 3 | Status: SHIPPED | OUTPATIENT
Start: 2020-02-03 | End: 2020-07-28 | Stop reason: SDUPTHER

## 2020-02-03 NOTE — PROGRESS NOTES
"CC: Mr. Narciso Becerra arrives today for management of Type 1  DM and review of chronic medical conditions, as listed in the visit diagnosis section of this encounter.     HPI: Mr. Narciso Becerra was diagnosed with Type 1  DM at age 18 after viral illness. Denies FH of DM. Denies hospitalizations due to DM.   Wears Dexcom G5.  He started insulin pump therapy with Omnipod in June 2015.  Has CAD, is s/p CABG in 2012 and follows with cardiology (Dr. Cedeño).  Follows with Dr. Estrada every 3-6 months for CKD.     Last seen by me in July.     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 Also often misses meal boluses and will take it late. He also states that he is entering carbs (which he is not consuming) late at night in order to get a bolus if he wakes up and notices that his glucose is elevated >200.     He retired last month and has Medicare with [employer] supplement.     He has not been wearing Dexcom G6 - has  but needs supplies from [employer]. He also received new Omnipod PDM (not DASH).    Currently testing BG 6x/day.    Hypoglycemia: Rare   Hypoglycemic Symptoms: dizziness, jitteriness and sweating   Hypoglycemia Treatment: [place] punch.     Exercise: No formal but plans to join golf club.     Dietary Habits: Eats 3 meals/day. May snack at 6PM if dinner won't be until 10PM, due to busy schedule. Snack would be [place] crackers or Oreos. Avoids sugary beverages.     Last DM education appointment: 2015     He reports that he stopped taking low dose levothyroxine 3 months ago because he thought he may not need it.       CURRENT DIABETIC MEDS: Humalog in Omnipod insulin pump  Glucometer type: Freestyle strips  Insulin back up plan: Lantus 17 units daily, Humalog per carb counting    Name of Device or Devices used by the patient: Omnipod  When did you start the current therapy " "you are on: 6/2015  Frequency of changing site/infusion set/tubing/cartridges: 3 days  Frequency of changing CGM: n/a  Using bolus wizard: yes  Taking bolus with each meal: yes    PUMP SETTINGS:  Basal:  0000 - 1 u/hr  1200 - 0.5 u/hr    I:C ratio:  0000 - 1:7    ISF:  40    Target:   0000 - 100-140    Active insulin time: 4 hours      Last Eye Exam: 1/2020 + DR. Atif Smith. Past retina deatchment. + cataract.   Last Podiatry Exam: n/a    REVIEW OF SYSTEMS  Constitutional: no c/o weakness, fatigue, weight loss.   Eyes: c/o visual disturbances to L eye that he attributes to cataract.  Cardiac: no palpitations or chest pain.  Respiratory: no cough or dyspnea.  GI: no c/o abdominal pain or nausea  Skin: no lesions or rashes.  Neuro: + mild numbness, tingling to BLE.   Endocrine: denies polyphagia, polydipsia, polyuria      Personally reviewed Past Medical, Surgical, Social History.    Vital Signs  /70   Pulse 80   Ht 5' 10" (1.778 m)   Wt 82.4 kg (181 lb 10.5 oz)   BMI 26.07 kg/m²     Personally reviewed the below labs:    Hemoglobin A1C   Date Value Ref Range Status   01/17/2020 8.7 (H) 4.0 - 5.6 % Final     Comment:     ADA Screening Guidelines:  5.7-6.4%  Consistent with prediabetes  >or=6.5%  Consistent with diabetes  High levels of fetal hemoglobin interfere with the HbA1C  assay. Heterozygous hemoglobin variants (HbS, HgC, etc)do  not significantly interfere with this assay.   However, presence of multiple variants may affect accuracy.     11/07/2019 8.6 (H) 4.0 - 5.6 % Final     Comment:     ADA Screening Guidelines:  5.7-6.4%  Consistent with prediabetes  >or=6.5%  Consistent with diabetes  High levels of fetal hemoglobin interfere with the HbA1C  assay. Heterozygous hemoglobin variants (HbS, HgC, etc)do  not significantly interfere with this assay.   However, presence of multiple variants may affect accuracy.     06/24/2019 7.6 (H) 4.0 - 5.6 % Final     Comment:     ADA Screening " Guidelines:  5.7-6.4%  Consistent with prediabetes  >or=6.5%  Consistent with diabetes  High levels of fetal hemoglobin interfere with the HbA1C  assay. Heterozygous hemoglobin variants (HbS, HgC, etc)do  not significantly interfere with this assay.   However, presence of multiple variants may affect accuracy.         Chemistry        Component Value Date/Time     11/07/2019 0852    K 4.5 11/07/2019 0852     11/07/2019 0852    CO2 28 11/07/2019 0852    BUN 18 11/07/2019 0852    CREATININE 1.6 (H) 11/07/2019 0852     (H) 11/07/2019 0852        Component Value Date/Time    CALCIUM 10.1 11/07/2019 0852    ALKPHOS 79 11/07/2019 0852    AST 22 11/07/2019 0852    ALT 20 11/07/2019 0852    BILITOT 0.7 11/07/2019 0852          Lab Results   Component Value Date    CHOL 139 11/07/2019    CHOL 146 06/24/2019    CHOL 148 12/12/2018     Lab Results   Component Value Date    HDL 59 11/07/2019    HDL 65 06/24/2019    HDL 55 12/12/2018     Lab Results   Component Value Date    LDLCALC 60.0 (L) 11/07/2019    LDLCALC 62.8 (L) 06/24/2019    LDLCALC 60.6 (L) 12/12/2018     Lab Results   Component Value Date    TRIG 100 11/07/2019    TRIG 91 06/24/2019    TRIG 162 (H) 12/12/2018     Lab Results   Component Value Date    CHOLHDL 42.4 11/07/2019    CHOLHDL 44.5 06/24/2019    CHOLHDL 37.2 12/12/2018       Lab Results   Component Value Date    MICALBCREAT 13.1 06/24/2019     Lab Results   Component Value Date    TSH 2.877 11/07/2019       CrCl cannot be calculated (Patient's most recent lab result is older than the maximum 7 days allowed.).    No results found for: [id number]      PHYSICAL EXAMINATION  Constitutional: Appears well, no distress  Neck: Supple, trachea midline; no thyromegaly or nodules.   Respiratory: CTA, even and unlabored.  Cardiovascular: RRR, no murmurs, no carotid bruits. DP pulses  2+ bilaterally; no edema.    GI: active bowel sounds, no hernia  Skin: warm and dry; no lipohypertrophy, or  acanthosis nigracans observed.  Neuro: DTR 1+ BUE/1+BLE. Previously, no loss of protective sensation via 10 gm monofilament. Vibratory exam decreased bilaterally  Feet: appropriate footwear.       PUMP DOWNLOAD: see media file for details. Many glucoses are elevated above goal. Hyperglycemia noted in the evenings, often before dinner and after midnight.  One episode of severe hypoglycemia (36 mg/dL) overnight after taking bolus with dinner. Two other episodes of hypoglycemia noted midday and in afternoon. Uses bolus wizard regularly. Entering CHO if overnight glucose is elevated.   Average TDD - 386 u  Basal: 48% (17.1 u)  Bolus: 52% (18.9 u)        DEXCOM DOWNLOAD: currently not in use.   Average glucose:   % above goal:   % within goal:   % below goal:         A1c goal 7-8%, due to CKD, CAD       Assessment/Plan  1. Type 1 diabetes mellitus with diabetic polyneuropathy        -- Uncontrolled, complex. + Dietary indiscretion. Will not adjust basal rates yet until he changes his snacking habits in the evening.   -- I advised pt to keep snacks low in carb and preferably higher in protein.   -- advised pt to take correction bolus ONLY when he has elevated glucose overnight. Do not enter CHO if not eating them.   -- change ISF as follows:     0000 - 35     0800 - 40     2000 - 35  -- change active insulin time to 3 hours  -- DM education for G6 start.  -- intensive BG monitoring 6x/day   -- will contact [employer] for order form for Dexcom G6 supplies.    -- Discussed diagnosis of DM, A1c goals, progression of disease, long term complications and tx options.  Advised patient to check BG before activities, such as driving or exercise.  -- Reviewed hypoglycemia management: treat with 1/2 glass of juice, 1/2 can regular coke, or 4 glucose tablets. Monitor and repeat treatment every 15 minutes until BG is >70 Then have a snack, which includes a complex carbohydrate and protein.    -- takes aspirin, statin   2. Type 1 diabetes  mellitus with retinopathy, macular edema presence unspecified, unspecified laterality, unspecified retinopathy severity  -- keep follow ups   3. Type 1 diabetes mellitus with stage 3 chronic kidney disease  -- avoid hypoglycemia  -- follows with nephrology   4. Coronary artery disease due to calcified coronary lesion  -- avoid hypoglycemia  -- follows with cardiology    5. Hypertension associated with diabetes  -- controlled  -- defer med mgt to cardiology or nephrology   6. Hyperlipidemia due to type 1 diabetes mellitus  -- controlled  -- continue statin   7. Insulin pump adjustment -- download reviewed and changes made   8. Subclinical hypothyroidism -- patient stopped taking levothyroxine 25 mcg daily.   -- TSH with RTC   9. Hypoglycemia associated with Type 1 DM -- possible hypoglycemia unawareness  -- doesn't occur in a true pattern.   -- recommend resuming Dexcom G6.         FOLLOW UP  Follow up in about 3 months (around 5/3/2020).   Patient instructed to bring BG logs to each follow up   Patient encouraged to call for any BG/medication issues, concerns, or questions.    Orders Placed This Encounter   Procedures    Hemoglobin A1c    TSH    Comprehensive metabolic panel    Ambulatory Referral to Diabetes Education

## 2020-02-03 NOTE — PATIENT INSTRUCTIONS
Keep snacks < 15 grams of carbs, mostly proteins (nuts, cheese, jerky). If snack has 15 grams of carbs or more, take a bolus with this.     PUMP SETTINGS:  Basal:  0000 - 1 u/hr  1200 - 0.5 u/hr    I:C ratio:  0000 - 1:7    ISF:    0000 - 35  0800 - 40  2000 - 35    Target:   100-140    Active insulin time: 3 hours

## 2020-02-03 NOTE — TELEPHONE ENCOUNTER
Pt. Will call [facility], located within [facility] at [phone], to see what do we need to do in order for him to get his DEXOM G6 sensors. Pt. Given fax number to have them fax any paper work needed to be filled out

## 2020-02-07 ENCOUNTER — CLINICAL SUPPORT (OUTPATIENT)
Dept: DIABETES | Facility: CLINIC | Age: 65
End: 2020-02-07
Payer: MEDICARE

## 2020-02-07 VITALS — WEIGHT: 181.69 LBS | BODY MASS INDEX: 26.01 KG/M2 | HEIGHT: 70 IN

## 2020-02-07 DIAGNOSIS — E10.42 TYPE 1 DIABETES MELLITUS WITH DIABETIC POLYNEUROPATHY: ICD-10-CM

## 2020-02-07 PROCEDURE — 99212 OFFICE O/P EST SF 10 MIN: CPT | Mod: PBBFAC,PO | Performed by: DIETITIAN, REGISTERED

## 2020-02-07 PROCEDURE — 99999 PR PBB SHADOW E&M-EST. PATIENT-LVL II: CPT | Mod: PBBFAC,,, | Performed by: DIETITIAN, REGISTERED

## 2020-02-07 PROCEDURE — 99999 PR PBB SHADOW E&M-EST. PATIENT-LVL II: ICD-10-PCS | Mod: PBBFAC,,, | Performed by: DIETITIAN, REGISTERED

## 2020-02-07 PROCEDURE — 95249 CONT GLUC MNTR PT PROV EQP: CPT | Mod: PBBFAC,PO | Performed by: DIETITIAN, REGISTERED

## 2020-02-07 NOTE — PROGRESS NOTES
Diabetes Education  Author: Thuy Willard RD, CDE  Date: 2/7/2020    Diabetes Care Management Summary  Diabetes Education Record Assessment/Progress: Comprehensive/Group  Current Diabetes Risk Level: Moderate     Last A1c:   Lab Results   Component Value Date    HGBA1C 8.7 (H) 01/17/2020     Last visit with Diabetes Educator: : 07/25/2019    Diabetes Type  Diabetes Type : Type I    Diabetes History  Current Treatment: Insulin pump(Omnipod insulin pump with Humalog insulin)    Health Maintenance was reviewed today with patient. Discussed with patient importance of routine eye exams, foot exams/foot care, blood work (i.e.: A1c, microalbumin, and lipid), dental visits, yearly flu vaccine, and pneumonia vaccine as indicated by PCP. Patient verbalized understanding.     Health Maintenance Topics with due status: Not Due       Topic Last Completion Date    TETANUS VACCINE 05/09/2012    Pneumococcal Vaccine (65+ High/Highest Risk) 06/09/2017    Colonoscopy 07/17/2017    Urine Microalbumin 06/24/2019    Lipid Panel 11/07/2019    Eye Exam 12/20/2019    Hemoglobin A1c 01/17/2020    Foot Exam 01/20/2020    High Dose Statin 02/03/2020    Aspirin/Antiplatelet Therapy 02/03/2020     There are no preventive care reminders to display for this patient.     Monitoring   Do you use a personal continuous glucose monitor?: Yes  What kind of glucose monitor do you use?: Dexcom(Upgrading from Dexcom G5 to Dexcom G6)     Current Diabetes Treatment   Current Treatment: Insulin pump(Omnipod insulin pump)     Diabetes Education Assessment/Progress  Medications (states correct name, dose, onset, peak, duration, side effects & timing of meds): Instructed, Demonstration, Comprehends Key Points    Patient arrives with new Omnipod PDM to be programmed.    Patient demonstrated the ability to program PDM and fill pod reservoir with Humalog insulin,  and insert pod into right back of arm per sterile technique.     OMNIPOD SETTINGS : (as per  "Endocrine provider 2/3/2020)  Basal rate:   12 AM-- 1 u/hr   12 PM-- 0.5 u/hr  Maximum basal: 3 u/hr   [place] level low: 20 units  Pod expiration alarm: 10 hours  Bolus Menu:  Blood glucose Targets:  12 am- 12am - 100-140 mg/dL  Correction factor:  12AM---35  8AM--40  8PM--35  Carb Ratio   12 am- 12 am = 1:7  Active insulin: 3 hrs  Maximum bolus = 15 units  Reverse Correction: on   Auto Off: off     Reviewed back up plan in case his pods would malfunction.  Stressed that he should change a pod if correction doses of insulin are not working.  Patient will call clinic with any issues with low or high sugars.        Monitoring (monitoring blood glucose/other parameters & using results): Discussion, Demonstration, Comprehends Key Points, Written Materials Provided    DIABETES EDUCATOR NOTE   PLACEMENT OF DEXCOM G6 PERSONAL CONTINOUS GLUCOSE MONITORING SYSTEM (CGMS)     REFERRING PROVIDER: Trudy Recinos,. NP     Patient is here in clinic today for placement of personal continuous glucose monitoring system (CGMS). He is upgrading from Dexcom G5 to Dexcom G6.  Patient has Dexcom G6  and Transmitter. Patient would like to use his iPhone to obtain glucose readings but patient does not know his Apple ID to login and download apps on his phone.  He will use the Dexcom  to obtain continuous glucose readings.  Pt verbalizes understanding to change sensor every 10 days. He is also aware that each time a new sensor is placed there is a 2 hour warm up period. No calibration is necessary however patient can calibrate if he desires but cautioned patient to calibrate more than 1-2 times. Patient understands to avoid calibration when glucose levels changing rapidly.       A detailed explanation of Dexcom G6 Continuous Glucose Monitoring System was provided. Reviewed the Dexcom "Getting Started Guide" with patient and he has a written copy for home use.  Instructed patient on how to enter blood sugar using Dexcom " .  Patient was allowed to practice and time allowed to ask questions. Patient verbalizes understanding to bring Dexcom  to each Endocrinology visit to be downloaded and glucose readings obtained.  . Pt will notify Endocrinology if glucose levels are above 250 mg/dL consistently or if episode of glucose less than 70 mg/dL presents.  Pt verbalizes understanding.         High alert set at 300 mg/dL (no repeat alarm)  Low alert set at 75 mg/dL (repeat alarm every 30 minutes)  High rising alert: off  Low dropping alert: off     An appropriate site was selected and prepared. Patient inserted sensor into right abdomen. Sensor will be changed every 10 days.  All questions answered.  Pt encouraged to contact Endocrinology department with any further questions or concerns.      Goals  Patient has selected/evaluated goals during today's session: Yes, evaluated  Monitoring: % Met  Met Percentage : 75%    Diabetes Care Plan/Intervention  Education Plan/Intervention:   1.  Dexcom G6 initiated on Dexcom --patient did not know his Apple ID login so unable to download Dexcom G6 teo on phone.    2.  New Omnipod PDM programmed using pump settings from 2/3/2020 Endocrinology visit.         Today's Self-Management Care Plan was developed with the patient's input and is based on barriers identified during today's assessment.    The long and short-term goals in the care plan were written with the patient/caregiver's input. The patient has agreed to work toward these goals to improve his overall diabetes control.      The patient received a copy of today's self-management plan and verbalized understanding of the care plan, goals, and all of today's instructions.      The patient was encouraged to communicate with his physician and care team regarding his condition(s) and treatment.  I provided the patient with my contact information today and encouraged him to contact me via phone or patient portal as needed.      Education Units of Time   Time Spent: 60 min

## 2020-02-12 ENCOUNTER — OFFICE VISIT (OUTPATIENT)
Dept: FAMILY MEDICINE | Facility: CLINIC | Age: 65
End: 2020-02-12
Payer: MEDICARE

## 2020-02-12 VITALS
TEMPERATURE: 100 F | SYSTOLIC BLOOD PRESSURE: 124 MMHG | HEART RATE: 100 BPM | OXYGEN SATURATION: 97 % | BODY MASS INDEX: 25.34 KG/M2 | WEIGHT: 177 LBS | DIASTOLIC BLOOD PRESSURE: 80 MMHG | HEIGHT: 70 IN

## 2020-02-12 DIAGNOSIS — R68.89 FLU-LIKE SYMPTOMS: ICD-10-CM

## 2020-02-12 DIAGNOSIS — J06.9 VIRAL UPPER RESPIRATORY ILLNESS: Primary | ICD-10-CM

## 2020-02-12 LAB
INFLUENZA A, MOLECULAR: NEGATIVE
INFLUENZA B, MOLECULAR: NEGATIVE
SPECIMEN SOURCE: NORMAL

## 2020-02-12 PROCEDURE — 99214 PR OFFICE/OUTPT VISIT, EST, LEVL IV, 30-39 MIN: ICD-10-PCS | Mod: S$PBB,,, | Performed by: NURSE PRACTITIONER

## 2020-02-12 PROCEDURE — 99999 PR PBB SHADOW E&M-EST. PATIENT-LVL V: CPT | Mod: PBBFAC,,, | Performed by: NURSE PRACTITIONER

## 2020-02-12 PROCEDURE — 99215 OFFICE O/P EST HI 40 MIN: CPT | Mod: PBBFAC,PO | Performed by: NURSE PRACTITIONER

## 2020-02-12 PROCEDURE — 87502 INFLUENZA DNA AMP PROBE: CPT | Mod: PO

## 2020-02-12 PROCEDURE — 99214 OFFICE O/P EST MOD 30 MIN: CPT | Mod: S$PBB,,, | Performed by: NURSE PRACTITIONER

## 2020-02-12 PROCEDURE — 99999 PR PBB SHADOW E&M-EST. PATIENT-LVL V: ICD-10-PCS | Mod: PBBFAC,,, | Performed by: NURSE PRACTITIONER

## 2020-02-12 NOTE — PATIENT INSTRUCTIONS
"Your symptoms today are consistent with viral respiratory illness.  This is likely a viral illness that needs to run its course.    Comfort measures:  Increase fluids  Get plenty of rest  Vaporizer or frequent showers may be helpful.  Take tylenol of ibuprofen as needed for pain or fever  For thick mucus secretions, you can take over the counter guaifenesin (mucinex), drink plenty of water while taking this to help loosen mucus.  To suppress the cough you may use a cough product with dextromethorphan (delsym or a product with DM designation).  If you have high blood pressure or atrial fibrillation, avoid any over the counter medications with "D" or decongestant.    Salt water gargles.  Saline nasal spray  Wash cloth with warm water placed over the sinus area can lessen discomfort and pressure.  Sleep with head of bed elevated to decrease drainage, cough and congestion.    I recommend frequent handwashing to limit spread of infection to others     If you are not better in 5-7 days, if worse or you have concerns or questions, please do not hesitate to call.  You can reach us at [phone] Monday through Thursday (except holidays) 8:30 a.m. to 5 p.m.  Or call Jefe Coronado MD's nurse.   NOTE:  I do not work on Fridays.  If you have concerns on Fridays, call your primary care office.    Evenings and weekends [facility] is also available if symptoms worsen or fail to improve.  When you call the number, a registered nurse answers and takes your information.  The nurse can look at your medical record and make recommendations or call the on call physician, if warranted.      Urgent Cares associated with [facility] are open M-F evenings and on the weekends, as well.    [facility] Address: [address] [place] [address] [address], [place] [address] Phone: [phone]    [facility] Address:  Address: [address] [place] [address] [place] [address] Phone: [phone]    Thank you for using the [facility]!    Roberta Gilberto, APRN, CNP, FNP-BC  [facility]

## 2020-02-12 NOTE — Clinical Note
Jefe Coronado MD,  I saw your patient today in Primary Care  If you have any questions, please do not hesitate to contact me.  Thank you!  VICKY Burnham, [facility]

## 2020-02-12 NOTE — PROGRESS NOTES
"Narciso Becerra is a 64 y.o. male patient of Jefe Coronado MD who presents to the clinic today for   Chief Complaint   Patient presents with    Sore Throat   .    HPI    Pt, who is known to me, reports a new problem to me: runny nose with occ d/c, ST, hoarse voice, burnins in the chest when he coughs, cough occ productive,  no energy, wants to sleep, feels "terrible".  Hasn't felt feverish.  Has 3 grandchildren but none of them are sick.  Did get a flu shot    These symptoms began 1 day ago and status is worse.     Symptoms are + acute.    Pt denies the following symptoms:  Rash, n/v    Aggravating factors include nothing .    Relieving factors include lying down .    OTC Medications tried are nothing.    Prescription medications taken for symptoms are nothing.    Pertinent medical history:  No chronic lung problems..    Smoking status:  nonsmoker    ROS    Constitutional:   Low grade  fever, + fatigue, no change in appetite.    Head:   No headache  Ears:   + pain.  Eyes:  No sxs  Nose:   + sinus pain, + congestion, + runny nose, + post nasal drip.  Throat:  + ST pain.    Heart:  No palpitations, chest pain.    Lungs:  No difficulty breathing, + coughing, occ sputum production, no wheezing.              Symptoms are community acquired.  No known sick contacts    GI/:  No sxs    MS:  No new bone, joint or muscle problems.    Skin:  No rashes, itching.    Past Medical History:   Diagnosis Date    Allergy     Anxiety     Cataract     CKD (chronic kidney disease) stage 3, GFR 30-59 ml/min 4/8/2014    COPD (chronic obstructive pulmonary disease)     Coronary artery disease     1. Cage march 6th 2012 2. SVG- OS 08/15/14    Diabetes mellitus     insulin dependent    Diabetes mellitus type I     GERD (gastroesophageal reflux disease)     Hypertension     Peripheral neuropathy 6/23/2015    Retinopathy of both eyes     Subclinical hypothyroidism 8/16/2018       Current Outpatient Medications:     aspirin " "(ECOTRIN) 81 MG EC tablet, Take 81 mg by mouth once daily., Disp: , Rfl:     atorvastatin (LIPITOR) 40 MG tablet, Take 40 mg by mouth once daily., Disp: , Rfl:     blood-glucose sensor (DEXCOM G4 SENSOR) [place], Please change sensor every 7 days, Disp: 13 Device, Rfl: 3    escitalopram oxalate (LEXAPRO) 10 MG tablet, Take 1 tablet (10 mg total) by mouth once daily., Disp: 90 tablet, Rfl: 3    folic acid (FOLVITE) 1 MG tablet, TAKE 1 TABLET (1 MG TOTAL) BY MOUTH ONCE DAILY., Disp: 90 tablet, Rfl: 3    FREESTYLE TEST Strp, CHECK BLOOD GLUCOSE 6 TIMES PER DAY - BEFORE MEALS AND AT BEDTIME. TO USE WITH OMNIPOD INSULIN PUMP PDM. DX CODE e10.42, Disp: 600 strip, Rfl: 3    glucagon (human recombinant) inj 1mg/mL kit, Inject 1 mL (1 mg total) into the muscle as needed., Disp: 1 kit, Rfl: 2    HUMALOG U-100 INSULIN 100 unit/mL injection, For continuous use in insulin pump. Max TDD 50 units, Disp: 50 mL, Rfl: 3    insulin glargine (LANTUS SOLOSTAR U-100 INSULIN) 100 unit/mL (3 mL) InPn pen, Inject 17 Units into the skin every evening. Use during cessation of pump therapy. The night before surgery, administer 13 units., Disp: 1 Box, Rfl: 6    insulin lispro (HUMALOG KWIKPEN INSULIN) 100 unit/mL pen, PLEASE SEE ATTACHED FOR DETAILED DIRECTIONS, Disp: 15 Syringe, Rfl: 11    insulin pump cartridge (OMNIPOD INSULIN REFILL) Crtg, Pods for Omnipod insulin pump. Change every 3 days, Disp: 30 each, Rfl: 3    insulin syringe-needle U-100 (BD INSULIN SYRINGE ULT-FINE II) 1/2 mL 31 x 5/16" Syrg, Inject once daily, Disp: 30 each, Rfl: 11    insulin syringe-needle U-100 1/2 mL 31 x 5/16" Syrg, 1 each by Misc.(Non-Drug; Combo Route) route 4 (four) times daily., Disp: 400 each, Rfl: 0    lancets Misc, Check blood glucose 6x/day. e10.42, Disp: 600 each, Rfl: 3    multivitamin capsule, Take 1 capsule by mouth once daily.  , Disp: , Rfl:     nitroGLYCERIN (NITROSTAT) 0.3 MG SL tablet, Place 0.3 mg under the tongue every 5 (five) " "minutes as needed.  , Disp: , Rfl:     pen needle, diabetic (BD ULTRA-FINE JUANA PEN NEEDLES) 32 gauge x 5/32" Ndle, USE 4 TIMES DAILY, Disp: 200 each, Rfl: 3    prasugrel (EFFIENT) 10 mg Tab, Take 10 mg by mouth once daily., Disp: , Rfl:     (No Medication Selected), Inject into the skin continuous., Disp: , Rfl:     ascorbic acid (VITAMIN C) 250 MG tablet, Take 1,000 mg by mouth 2 (two) times daily. , Disp: , Rfl:     fluticasone (FLONASE) 50 mcg/actuation nasal spray, INSTILL 2 SPRAYS INTO EACH NOSTRIL ONCE DAILY (Patient not taking: Reported on 2/12/2020), Disp: 16 g, Rfl: 11    Patient is not a smoker.    PHYSICAL EXAM    Alert, coop 64 y.o. male patient in no acute distress.    Vitals:    02/12/20 1501   BP: 124/80   Pulse: 100   Temp: 99.6 °F (37.6 °C)     VS reviewed.  VS stable.  CC, nursing note, medications & PMH all reviewed today.    Head:  Normocephalic, atraumatic.    EENT:  EACs patent.  TMs no erythema, dull LR, no effusions, no TM perforation.                              Eye lids without lesions , conjunctivae not injected, no discharge present.       Sinus tenderness to palp--none.               Nares--mild edema, no d/c present.    Pharynx not injected.                Tonsils not erythematous , not enlarged, no exudate present.    Several bilat NT anterior, no posterior cervical lymph nodes palpable.    No submandibular , submental or supraclavicular lymph nodes palp.             Resp:  Respirations even, unlabored   Lungs CTA bilat.  No wheezing.  No crackles.  Moves air to bases bilat.    Heart:  RRR, no murmur.    MS:  Ambulates with steady gait.             NEURO:  Alert and oriented x 4.  Responds appropriately during interaction.    Skin:  Warm, dry, color good.    Viral upper respiratory illness    Flu-like symptoms  -     Cancel: POCT Influenza A/B  -     Influenza A & B by Molecular      Pt today presents with 2 days of feeling ill:  Nasal congestion, runny nose, sore throat, cough, " fatigue, low-grade fever.  States he feels terrible and just wants to sleep.  He did get the flu shot and he has had no known exposure to the flu.    Exam shows dull TMs, clear pharynx, nasal tissue edema, no sinus pain to palpation, small anterior cervical nodes that are nontender to palpation, lungs clear to auscultation, heart regular rate and rhythm.    Findings consistent with viral respiratory illness.    Lab & Radiological Tests and/or EKG Ordered:  Influenza a/B.  The results are neg.     Pt advised to perform comfort measures recommended on patient instruction sheet .    If not better in 5 days, the patient is advised to call here, PCP office or [facility] ON CALL.  If worse or concerns, the patient is advised to call here, the PCP office or [facility] ON CALL or go to the URGENT CARE or ER.  Explained exam findings, diagnosis and treatment plan to patient.  Questions answered and patient states understanding.

## 2020-02-16 ENCOUNTER — PATIENT MESSAGE (OUTPATIENT)
Dept: FAMILY MEDICINE | Facility: CLINIC | Age: 65
End: 2020-02-16

## 2020-02-17 DIAGNOSIS — F43.20 ADULT SITUATIONAL STRESS DISORDER: Chronic | ICD-10-CM

## 2020-02-17 DIAGNOSIS — F41.1 GAD (GENERALIZED ANXIETY DISORDER): ICD-10-CM

## 2020-02-17 RX ORDER — ESCITALOPRAM OXALATE 10 MG/1
10 TABLET ORAL DAILY
Qty: 90 TABLET | Refills: 3 | Status: SHIPPED | OUTPATIENT
Start: 2020-02-17 | End: 2020-11-27 | Stop reason: SDUPTHER

## 2020-02-18 ENCOUNTER — LAB VISIT (OUTPATIENT)
Dept: LAB | Facility: HOSPITAL | Age: 65
End: 2020-02-18
Attending: INTERNAL MEDICINE
Payer: MEDICARE

## 2020-02-18 DIAGNOSIS — I12.9 PARENCHYMAL RENAL HYPERTENSION: ICD-10-CM

## 2020-02-18 DIAGNOSIS — D63.1 ANEMIA OF CHRONIC RENAL FAILURE: Primary | ICD-10-CM

## 2020-02-18 DIAGNOSIS — N18.9 ANEMIA OF CHRONIC RENAL FAILURE: Primary | ICD-10-CM

## 2020-02-18 DIAGNOSIS — N18.2 CHRONIC KIDNEY DISEASE, STAGE II (MILD): ICD-10-CM

## 2020-02-18 DIAGNOSIS — N40.0 BENIGN ENLARGEMENT OF PROSTATE: ICD-10-CM

## 2020-02-18 DIAGNOSIS — N18.30 CHRONIC KIDNEY DISEASE, STAGE III (MODERATE): ICD-10-CM

## 2020-02-18 DIAGNOSIS — R80.9 PROTEINURIA: ICD-10-CM

## 2020-02-18 LAB
ALBUMIN SERPL BCP-MCNC: 3.8 G/DL (ref 3.5–5.2)
ALP SERPL-CCNC: 73 U/L (ref 55–135)
ALT SERPL W/O P-5'-P-CCNC: 20 U/L (ref 10–44)
ANION GAP SERPL CALC-SCNC: 11 MMOL/L (ref 8–16)
AST SERPL-CCNC: 21 U/L (ref 10–40)
BASOPHILS # BLD AUTO: 0.03 K/UL (ref 0–0.2)
BASOPHILS NFR BLD: 0.7 % (ref 0–1.9)
BILIRUB SERPL-MCNC: 0.4 MG/DL (ref 0.1–1)
BUN SERPL-MCNC: 11 MG/DL (ref 8–23)
CALCIUM SERPL-MCNC: 9.1 MG/DL (ref 8.7–10.5)
CHLORIDE SERPL-SCNC: 101 MMOL/L (ref 95–110)
CO2 SERPL-SCNC: 23 MMOL/L (ref 23–29)
CREAT SERPL-MCNC: 1.1 MG/DL (ref 0.5–1.4)
DIFFERENTIAL METHOD: ABNORMAL
EOSINOPHIL # BLD AUTO: 0.2 K/UL (ref 0–0.5)
EOSINOPHIL NFR BLD: 5.3 % (ref 0–8)
ERYTHROCYTE [DISTWIDTH] IN BLOOD BY AUTOMATED COUNT: 12.7 % (ref 11.5–14.5)
EST. GFR  (AFRICAN AMERICAN): >60 ML/MIN/1.73 M^2
EST. GFR  (NON AFRICAN AMERICAN): >60 ML/MIN/1.73 M^2
FERRITIN SERPL-MCNC: 48 NG/ML (ref 20–300)
GLUCOSE SERPL-MCNC: 85 MG/DL (ref 70–110)
HCT VFR BLD AUTO: 36.3 % (ref 40–54)
HGB BLD-MCNC: 11.9 G/DL (ref 14–18)
IMM GRANULOCYTES # BLD AUTO: 0.01 K/UL (ref 0–0.04)
IMM GRANULOCYTES NFR BLD AUTO: 0.2 % (ref 0–0.5)
IRON SERPL-MCNC: 82 UG/DL (ref 45–160)
LYMPHOCYTES # BLD AUTO: 1.6 K/UL (ref 1–4.8)
LYMPHOCYTES NFR BLD: 37.5 % (ref 18–48)
MCH RBC QN AUTO: 31.3 PG (ref 27–31)
MCHC RBC AUTO-ENTMCNC: 32.8 G/DL (ref 32–36)
MCV RBC AUTO: 96 FL (ref 82–98)
MONOCYTES # BLD AUTO: 0.4 K/UL (ref 0.3–1)
MONOCYTES NFR BLD: 9.8 % (ref 4–15)
NEUTROPHILS # BLD AUTO: 2 K/UL (ref 1.8–7.7)
NEUTROPHILS NFR BLD: 46.5 % (ref 38–73)
NRBC BLD-RTO: 0 /100 WBC
PLATELET # BLD AUTO: 266 K/UL (ref 150–350)
PMV BLD AUTO: 10.5 FL (ref 9.2–12.9)
POTASSIUM SERPL-SCNC: 4.5 MMOL/L (ref 3.5–5.1)
PROT SERPL-MCNC: 6.7 G/DL (ref 6–8.4)
RBC # BLD AUTO: 3.8 M/UL (ref 4.6–6.2)
SATURATED IRON: 21 % (ref 20–50)
SODIUM SERPL-SCNC: 135 MMOL/L (ref 136–145)
TOTAL IRON BINDING CAPACITY: 397 UG/DL (ref 250–450)
TRANSFERRIN SERPL-MCNC: 268 MG/DL (ref 200–375)
WBC # BLD AUTO: 4.37 K/UL (ref 3.9–12.7)

## 2020-02-18 PROCEDURE — 83540 ASSAY OF IRON: CPT

## 2020-02-18 PROCEDURE — 82728 ASSAY OF FERRITIN: CPT

## 2020-02-18 PROCEDURE — 80053 COMPREHEN METABOLIC PANEL: CPT

## 2020-02-18 PROCEDURE — 82570 ASSAY OF URINE CREATININE: CPT

## 2020-02-18 PROCEDURE — 85025 COMPLETE CBC W/AUTO DIFF WBC: CPT

## 2020-02-18 PROCEDURE — 36415 COLL VENOUS BLD VENIPUNCTURE: CPT | Mod: PO

## 2020-02-19 LAB
CREAT UR-MCNC: 30 MG/DL (ref 23–375)
PROT UR-MCNC: <7 MG/DL (ref 0–15)
PROT/CREAT UR: NORMAL MG/G{CREAT} (ref 0–0.2)

## 2020-03-17 ENCOUNTER — TELEPHONE (OUTPATIENT)
Dept: ENDOCRINOLOGY | Facility: CLINIC | Age: 65
End: 2020-03-17

## 2020-03-17 DIAGNOSIS — E10.319 TYPE 1 DIABETES MELLITUS WITH RETINOPATHY, MACULAR EDEMA PRESENCE UNSPECIFIED, UNSPECIFIED LATERALITY, UNSPECIFIED RETINOPATHY SEVERITY: Primary | ICD-10-CM

## 2020-03-17 NOTE — TELEPHONE ENCOUNTER
Rob Yates it seems they just needed the chart notes and Physicians order. Fax wasn't clear. But just leave order there just in case the ask later on. Thanks

## 2020-03-17 NOTE — TELEPHONE ENCOUNTER
DNS asking for Pt's last C-peptide and fasting glucose in order to process his pump. Can you please order them so I can call the pt. And have them done?.Thanks

## 2020-03-17 NOTE — TELEPHONE ENCOUNTER
C-peptide and fasting glucose ordered entered. He has labs ordered at end of April but he may need before. Please schedule.

## 2020-04-24 ENCOUNTER — TELEPHONE (OUTPATIENT)
Dept: ENDOCRINOLOGY | Facility: CLINIC | Age: 65
End: 2020-04-24

## 2020-04-27 ENCOUNTER — PATIENT MESSAGE (OUTPATIENT)
Dept: ENDOCRINOLOGY | Facility: CLINIC | Age: 65
End: 2020-04-27

## 2020-04-30 ENCOUNTER — TELEPHONE (OUTPATIENT)
Dept: ENDOCRINOLOGY | Facility: CLINIC | Age: 65
End: 2020-04-30

## 2020-04-30 ENCOUNTER — PATIENT MESSAGE (OUTPATIENT)
Dept: ENDOCRINOLOGY | Facility: CLINIC | Age: 65
End: 2020-04-30

## 2020-04-30 NOTE — TELEPHONE ENCOUNTER
Please contact [facility]. This is unacceptable and they are placing patient's health at risk. Nowhere in my note does it state that he isn't taking lunch boluses. He absolutely needs Dexcom asap. Please keep pt updated with status.

## 2020-04-30 NOTE — TELEPHONE ENCOUNTER
Spoke with pt.  Adv him I spoke with [facility] and was transferred to Luz, the coordinator.  Had to leave a voicemail for her to call the office (her direct number is [phone]).  Adv her on the voicemail we have not rec'd anything from [facility] today, and that the order form and chart notes were faxed on 4/24/20 and 4/27/20. Also adv her nowhere in Ms. Yates's note does it state pt is missing lunch boluses.

## 2020-04-30 NOTE — TELEPHONE ENCOUNTER
Please notify pt that they are requiring that he have a face to face visit. He is currently scheduled 2 months from now. Please move up. Patient usually has an extended appt (usually at 3:30, due to time concerns with multiple concerns), can offer 10:00 on 5/14 or sooner if you can find one.

## 2020-04-30 NOTE — TELEPHONE ENCOUNTER
"Rec'd fax from the coordinator at [facility].  States "after thorough examination of the provided medical records, they do not meet criteria needed to be funded by the patient's insurance.  As the pt is on a medicare funded policy, their visit notes must meet medicare's therapeutic CGM coverage criteria.  The pt must return for a f/u face to face visit and new documentation must be provided that demonstrates the pt is meeting medicare CGM therapeutic coverage criteria.  Referral status update states cancelled because medicare non-compliant.    Once completed, send new face to face visit notes to [phone].    "

## 2020-05-04 ENCOUNTER — PATIENT MESSAGE (OUTPATIENT)
Dept: FAMILY MEDICINE | Facility: CLINIC | Age: 65
End: 2020-05-04

## 2020-05-05 ENCOUNTER — PATIENT MESSAGE (OUTPATIENT)
Dept: ADMINISTRATIVE | Facility: HOSPITAL | Age: 65
End: 2020-05-05

## 2020-05-07 ENCOUNTER — LAB VISIT (OUTPATIENT)
Dept: LAB | Facility: HOSPITAL | Age: 65
End: 2020-05-07
Attending: FAMILY MEDICINE
Payer: MEDICARE

## 2020-05-07 DIAGNOSIS — Z12.5 PROSTATE CANCER SCREENING: ICD-10-CM

## 2020-05-07 DIAGNOSIS — E10.42 TYPE 1 DIABETES MELLITUS WITH DIABETIC POLYNEUROPATHY: ICD-10-CM

## 2020-05-07 LAB
ALBUMIN SERPL BCP-MCNC: 4.6 G/DL (ref 3.5–5.2)
ALP SERPL-CCNC: 76 U/L (ref 55–135)
ALT SERPL W/O P-5'-P-CCNC: 23 U/L (ref 10–44)
ANION GAP SERPL CALC-SCNC: 10 MMOL/L (ref 8–16)
AST SERPL-CCNC: 25 U/L (ref 10–40)
BILIRUB SERPL-MCNC: 0.7 MG/DL (ref 0.1–1)
BUN SERPL-MCNC: 13 MG/DL (ref 8–23)
CALCIUM SERPL-MCNC: 10.1 MG/DL (ref 8.7–10.5)
CHLORIDE SERPL-SCNC: 104 MMOL/L (ref 95–110)
CO2 SERPL-SCNC: 26 MMOL/L (ref 23–29)
COMPLEXED PSA SERPL-MCNC: 0.99 NG/ML (ref 0–4)
CREAT SERPL-MCNC: 1.3 MG/DL (ref 0.5–1.4)
EST. GFR  (AFRICAN AMERICAN): >60 ML/MIN/1.73 M^2
EST. GFR  (NON AFRICAN AMERICAN): 56.9 ML/MIN/1.73 M^2
ESTIMATED AVG GLUCOSE: 160 MG/DL (ref 68–131)
GLUCOSE SERPL-MCNC: 59 MG/DL (ref 70–110)
HBA1C MFR BLD HPLC: 7.2 % (ref 4–5.6)
POTASSIUM SERPL-SCNC: 4.1 MMOL/L (ref 3.5–5.1)
PROT SERPL-MCNC: 7.9 G/DL (ref 6–8.4)
SODIUM SERPL-SCNC: 140 MMOL/L (ref 136–145)
T4 FREE SERPL-MCNC: 0.81 NG/DL (ref 0.71–1.51)
TSH SERPL DL<=0.005 MIU/L-ACNC: 7.11 UIU/ML (ref 0.4–4)

## 2020-05-07 PROCEDURE — 80053 COMPREHEN METABOLIC PANEL: CPT

## 2020-05-07 PROCEDURE — 36415 COLL VENOUS BLD VENIPUNCTURE: CPT | Mod: PO

## 2020-05-07 PROCEDURE — 84153 ASSAY OF PSA TOTAL: CPT

## 2020-05-07 PROCEDURE — 83036 HEMOGLOBIN GLYCOSYLATED A1C: CPT

## 2020-05-07 PROCEDURE — 84443 ASSAY THYROID STIM HORMONE: CPT

## 2020-05-07 PROCEDURE — 84439 ASSAY OF FREE THYROXINE: CPT

## 2020-05-08 ENCOUNTER — PATIENT MESSAGE (OUTPATIENT)
Dept: FAMILY MEDICINE | Facility: CLINIC | Age: 65
End: 2020-05-08

## 2020-05-10 ENCOUNTER — PATIENT MESSAGE (OUTPATIENT)
Dept: FAMILY MEDICINE | Facility: CLINIC | Age: 65
End: 2020-05-10

## 2020-05-10 DIAGNOSIS — E10.319 TYPE 1 DIABETES MELLITUS WITH RETINOPATHY, MACULAR EDEMA PRESENCE UNSPECIFIED, UNSPECIFIED LATERALITY, UNSPECIFIED RETINOPATHY SEVERITY: Primary | ICD-10-CM

## 2020-05-10 DIAGNOSIS — E10.69 HYPERLIPIDEMIA DUE TO TYPE 1 DIABETES MELLITUS: ICD-10-CM

## 2020-05-10 DIAGNOSIS — E78.5 HYPERLIPIDEMIA DUE TO TYPE 1 DIABETES MELLITUS: ICD-10-CM

## 2020-05-11 NOTE — TELEPHONE ENCOUNTER
Patient requesting orders for a Lipid profile.  He had labs done last week and a lipid was not ordered. Last lipid was done 11/07/19

## 2020-05-12 ENCOUNTER — PATIENT MESSAGE (OUTPATIENT)
Dept: ENDOCRINOLOGY | Facility: CLINIC | Age: 65
End: 2020-05-12

## 2020-05-12 DIAGNOSIS — G90.3 NEUROGENIC ORTHOSTATIC HYPOTENSION: ICD-10-CM

## 2020-05-12 RX ORDER — FOLIC ACID 1 MG/1
TABLET ORAL
Qty: 90 TABLET | Refills: 3 | Status: SHIPPED | OUTPATIENT
Start: 2020-05-12 | End: 2021-05-06

## 2020-05-12 NOTE — TELEPHONE ENCOUNTER
"Spoke to pt via phone and Pt states that he does not need this test done it was already done and he was doing  Thus for his "cardiologist" . Noted and pt was not scheduled.   "

## 2020-05-14 ENCOUNTER — TELEPHONE (OUTPATIENT)
Dept: ENDOCRINOLOGY | Facility: CLINIC | Age: 65
End: 2020-05-14

## 2020-05-14 ENCOUNTER — OFFICE VISIT (OUTPATIENT)
Dept: ENDOCRINOLOGY | Facility: CLINIC | Age: 65
End: 2020-05-14
Payer: MEDICARE

## 2020-05-14 VITALS
HEIGHT: 70 IN | SYSTOLIC BLOOD PRESSURE: 118 MMHG | DIASTOLIC BLOOD PRESSURE: 60 MMHG | BODY MASS INDEX: 25.41 KG/M2 | HEART RATE: 80 BPM | WEIGHT: 177.5 LBS

## 2020-05-14 DIAGNOSIS — Z46.81 INSULIN PUMP FITTING OR ADJUSTMENT: ICD-10-CM

## 2020-05-14 DIAGNOSIS — E10.69 HYPERLIPIDEMIA DUE TO TYPE 1 DIABETES MELLITUS: ICD-10-CM

## 2020-05-14 DIAGNOSIS — I25.84 CORONARY ARTERY DISEASE DUE TO CALCIFIED CORONARY LESION: ICD-10-CM

## 2020-05-14 DIAGNOSIS — E10.42 TYPE 1 DIABETES MELLITUS WITH DIABETIC POLYNEUROPATHY: Primary | ICD-10-CM

## 2020-05-14 DIAGNOSIS — I25.10 CORONARY ARTERY DISEASE DUE TO CALCIFIED CORONARY LESION: ICD-10-CM

## 2020-05-14 DIAGNOSIS — E78.5 HYPERLIPIDEMIA DUE TO TYPE 1 DIABETES MELLITUS: ICD-10-CM

## 2020-05-14 DIAGNOSIS — N18.30 TYPE 1 DIABETES MELLITUS WITH STAGE 3 CHRONIC KIDNEY DISEASE: ICD-10-CM

## 2020-05-14 DIAGNOSIS — E10.649 HYPOGLYCEMIA DUE TO TYPE 1 DIABETES MELLITUS: ICD-10-CM

## 2020-05-14 DIAGNOSIS — E10.22 TYPE 1 DIABETES MELLITUS WITH STAGE 3 CHRONIC KIDNEY DISEASE: ICD-10-CM

## 2020-05-14 DIAGNOSIS — E10.319 TYPE 1 DIABETES MELLITUS WITH RETINOPATHY, MACULAR EDEMA PRESENCE UNSPECIFIED, UNSPECIFIED LATERALITY, UNSPECIFIED RETINOPATHY SEVERITY: ICD-10-CM

## 2020-05-14 DIAGNOSIS — E03.8 SUBCLINICAL HYPOTHYROIDISM: ICD-10-CM

## 2020-05-14 DIAGNOSIS — I15.2 HYPERTENSION ASSOCIATED WITH DIABETES: ICD-10-CM

## 2020-05-14 DIAGNOSIS — E11.59 HYPERTENSION ASSOCIATED WITH DIABETES: ICD-10-CM

## 2020-05-14 PROCEDURE — 95251 CONT GLUC MNTR ANALYSIS I&R: CPT | Mod: ,,, | Performed by: NURSE PRACTITIONER

## 2020-05-14 PROCEDURE — 99215 OFFICE O/P EST HI 40 MIN: CPT | Mod: PBBFAC,PN | Performed by: NURSE PRACTITIONER

## 2020-05-14 PROCEDURE — 95251 PR GLUCOSE MONITOR, 72 HOUR, PHYS INTERP: ICD-10-PCS | Mod: ,,, | Performed by: NURSE PRACTITIONER

## 2020-05-14 PROCEDURE — 99999 PR PBB SHADOW E&M-EST. PATIENT-LVL V: CPT | Mod: PBBFAC,,, | Performed by: NURSE PRACTITIONER

## 2020-05-14 PROCEDURE — 99215 PR OFFICE/OUTPT VISIT, EST, LEVL V, 40-54 MIN: ICD-10-PCS | Mod: S$PBB,,, | Performed by: NURSE PRACTITIONER

## 2020-05-14 PROCEDURE — 99999 PR PBB SHADOW E&M-EST. PATIENT-LVL V: ICD-10-PCS | Mod: PBBFAC,,, | Performed by: NURSE PRACTITIONER

## 2020-05-14 PROCEDURE — 99215 OFFICE O/P EST HI 40 MIN: CPT | Mod: S$PBB,,, | Performed by: NURSE PRACTITIONER

## 2020-05-14 RX ORDER — LEVOTHYROXINE SODIUM 25 UG/1
25 TABLET ORAL
Qty: 30 TABLET | Refills: 11 | Status: SHIPPED | OUTPATIENT
Start: 2020-05-14 | End: 2021-05-06

## 2020-05-14 NOTE — TELEPHONE ENCOUNTER
Last chart notes sent to [facility] at [phone] for DEXCOM     Rep: Jose Alberto Jeff  [phone]

## 2020-05-14 NOTE — PROGRESS NOTES
CC: Mr. Nacriso Becerra arrives today for management of Type 1  DM and review of chronic medical conditions, as listed in the visit diagnosis section of this encounter.     HPI: Mr. Narciso Becerra was diagnosed with Type 1  DM at age 18 after viral illness. Denies FH of DM. Denies hospitalizations due to DM.   Wears Dexcom G5.  He started insulin pump therapy with Omnipod in 2015.  Has CAD, is s/p CABG in  and follows with cardiology (Dr. Cedeño).  Follows with Dr. Estrada every 3-6 months for CKD.     Last seen by me in February. At this time, ISF settings were adjusted.      Since retiring in February, he has cut back on snacking.     BG monitoring per Dexcom G6. Currently not in use for the past 2 weeks. His transmitter . Unfortunately, his [employer] company is not making any exceptions during COVID-19 pandemic. They have denied him supply refills of this medically necessary device until he has a face to face visit.  He is planning to get the Dexcom smartphone teo so that he can set up for sharing.     He is entering CHO overnight if glucose is elevated.     Hypoglycemia: Occasionally in the morning. Occurred when fasting for recent lab work at 9:00 AM.  Hypoglycemic Symptoms: dizziness, jitteriness and sweating   Hypoglycemia Treatment: [place] punch.     Exercise: No formal     Dietary Habits: Eats 3 meals/day, on average. Occasional snack of Oreos when glucose is low. Avoids sugary beverages.     Last DM education appointment:      He reports that he stopped taking low dose levothyroxine in November because he thought he may not need it.       CURRENT DIABETIC MEDS: Humalog in Omnipod insulin pump  Glucometer type: Freestyle strips  Insulin back up plan: Lantus 17 units daily, Humalog per carb counting    Name of Device or Devices used by the patient: Omnipod  When did you start the current therapy you are on: 2015  Frequency of changing site/infusion set/tubing/cartridges: 3  "days  Frequency of changing CGM: 10 days  Using bolus wizard: yes  Taking bolus with each meal: yes    PUMP SETTINGS:  Basal:  0000 - 1 u/hr  1200 - 0.5 u/hr    I:C ratio:  0000 - 1:7    ISF:    0000 - 35  0800 - 40  2000 - 35    Target:   0000 - 100-140    Active insulin time: 3 hours      Last Eye Exam: 1/2020 + DR. Atif Smith. Past retina deatchment. + cataract.   Last Podiatry Exam: n/a    REVIEW OF SYSTEMS  Constitutional: no c/o weakness, fatigue. + 7# intentional weight loss.   Eyes: c/o visual disturbances to L eye that he attributes to cataract.  Cardiac: no palpitations or chest pain.  Respiratory: no cough or dyspnea.  GI: no c/o abdominal pain or nausea  Skin: no lesions or rashes.  Neuro: + mild numbness, tingling to BLE.   Endocrine: denies polyphagia, polydipsia, polyuria      Personally reviewed Past Medical, Surgical, Social History.    Vital Signs  /60   Pulse 80   Ht 5' 10" (1.778 m)   Wt 80.5 kg (177 lb 7.5 oz)   BMI 25.46 kg/m²     Personally reviewed the below labs:    Hemoglobin A1C   Date Value Ref Range Status   05/07/2020 7.2 (H) 4.0 - 5.6 % Final     Comment:     ADA Screening Guidelines:  5.7-6.4%  Consistent with prediabetes  >or=6.5%  Consistent with diabetes  High levels of fetal hemoglobin interfere with the HbA1C  assay. Heterozygous hemoglobin variants (HbS, HgC, etc)do  not significantly interfere with this assay.   However, presence of multiple variants may affect accuracy.     01/17/2020 8.7 (H) 4.0 - 5.6 % Final     Comment:     ADA Screening Guidelines:  5.7-6.4%  Consistent with prediabetes  >or=6.5%  Consistent with diabetes  High levels of fetal hemoglobin interfere with the HbA1C  assay. Heterozygous hemoglobin variants (HbS, HgC, etc)do  not significantly interfere with this assay.   However, presence of multiple variants may affect accuracy.     11/07/2019 8.6 (H) 4.0 - 5.6 % Final     Comment:     ADA Screening Guidelines:  5.7-6.4%  Consistent with " prediabetes  >or=6.5%  Consistent with diabetes  High levels of fetal hemoglobin interfere with the HbA1C  assay. Heterozygous hemoglobin variants (HbS, HgC, etc)do  not significantly interfere with this assay.   However, presence of multiple variants may affect accuracy.         Chemistry        Component Value Date/Time     05/07/2020 0856    K 4.1 05/07/2020 0856     05/07/2020 0856    CO2 26 05/07/2020 0856    BUN 13 05/07/2020 0856    CREATININE 1.3 05/07/2020 0856    GLU 59 (L) 05/07/2020 0856        Component Value Date/Time    CALCIUM 10.1 05/07/2020 0856    ALKPHOS 76 05/07/2020 0856    AST 25 05/07/2020 0856    ALT 23 05/07/2020 0856    BILITOT 0.7 05/07/2020 0856          Lab Results   Component Value Date    CHOL 139 11/07/2019    CHOL 146 06/24/2019    CHOL 148 12/12/2018     Lab Results   Component Value Date    HDL 59 11/07/2019    HDL 65 06/24/2019    HDL 55 12/12/2018     Lab Results   Component Value Date    LDLCALC 60.0 (L) 11/07/2019    LDLCALC 62.8 (L) 06/24/2019    LDLCALC 60.6 (L) 12/12/2018     Lab Results   Component Value Date    TRIG 100 11/07/2019    TRIG 91 06/24/2019    TRIG 162 (H) 12/12/2018     Lab Results   Component Value Date    CHOLHDL 42.4 11/07/2019    CHOLHDL 44.5 06/24/2019    CHOLHDL 37.2 12/12/2018       Lab Results   Component Value Date    MICALBCREAT 13.1 06/24/2019     Lab Results   Component Value Date    TSH 7.114 (H) 05/07/2020       CrCl cannot be calculated (Unknown ideal weight.).    No results found for: [id number]      PHYSICAL EXAMINATION  Constitutional: Appears well, no distress  Neck: Supple, trachea midline; no thyromegaly or nodules.   Respiratory: CTA, even and unlabored.  Cardiovascular: RRR, no murmurs, no carotid bruits. DP pulses  2+ bilaterally; no edema.    GI: active bowel sounds, no hernia  Skin: warm and dry; no lipohypertrophy, or acanthosis nigracans observed.  Neuro: DTR 1+ BUE/2+BLE. Previously, no loss of protective sensation  via 10 gm monofilament. Vibratory exam decreased bilaterally  Feet: appropriate footwear.       PUMP DOWNLOAD: see media file for details. Many daytime glucoses are reasonable. Hyperglycemia noted in early morning hours (~1-4AM). At these times, he enters approx 9g CHO and takes bolus. Occasional hypoglycemia at various times.   Average TDD - 36.6 u  Basal: 51% (18.7 u)  Bolus: 49%         DEXCOM DOWNLOAD: currently not in use. Reviewed data from mid-late April. Fasting hypoglycemia noted. Nocturnal glucoses occasionally elevated overnight. Minimal prandial excursions. Most glucoses are quite reasonable.   Average glucose: 140  % above goal: 18%  % within goal: 80%  % below goal: 2%        A1c goal 7-8%, due to CKD, CAD       Assessment/Plan  1. Type 1 diabetes mellitus with diabetic polyneuropathy        -- Time sensitive. Complex. Nice improvement in A1c. However, having some fasting hypoglycemia since he often skips breakfast.   -- advised pt to only take correction bolus when he has elevated glucose overnight. Do not enter CHO if not eating them.   -- change basal rates as follows:  0000 - 1 u/hr  0400 - 0.9 u/hr  0800 - 1 u/hr  1200 - 0.5 u/hr  -- resume Dexcom G6 once allowed by his [employer] company.  -- will fax today's note to [facility] for Dexcom G6 supplies order.  -- intensive BG monitoring 6x/day until then.     -- Patient currently manages Type 1 diabetes mellitus with an intensive insulin regimen consisting of insulin pump therapy. The patient has been using SMBG for frequent glucose monitoring (4+ times per day). The patient requires a therapeutic CGM and is willing to use the CGM for the necessary frequent self-adjustments of insulin therapy. I will continue to have in-person visits every 3 months to assess adherence to his CGM regimen and diabetes treatment plan.       -- Discussed diagnosis of DM, A1c goals, progression of disease, long term complications and tx options.  Advised patient to check BG before  activities, such as driving or exercise.  -- Reviewed hypoglycemia management: treat with 1/2 glass of juice, 1/2 can regular coke, or 4 glucose tablets. Monitor and repeat treatment every 15 minutes until BG is >70 Then have a snack, which includes a complex carbohydrate and protein.    -- takes aspirin, statin   2. Type 1 diabetes mellitus with retinopathy, macular edema presence unspecified, unspecified laterality, unspecified retinopathy severity  -- keep follow ups   3. Type 1 diabetes mellitus with stage 3 chronic kidney disease  -- avoid hypoglycemia  -- follows with nephrology   4. Coronary artery disease due to calcified coronary lesion  -- avoid hypoglycemia  -- follows with cardiology    5. Hypertension associated with diabetes  -- controlled  -- defer med mgt to cardiology or nephrology   6. Hyperlipidemia due to type 1 diabetes mellitus  -- controlled  -- continue statin   7. Insulin pump adjustment -- download reviewed and changes made   8. Subclinical hypothyroidism -- uncontrolled since stopping medication.   -- resume levothyroxine 25 mcg daily.   -- TSH in 6 weeks   9. Hypoglycemia associated with Type 1 DM -- apparent on recent labs  -- possible hypoglycemia unawareness  -- recommend resuming Dexcom G6 when able         FOLLOW UP  Follow up in about 3 months (around 8/14/2020).   Patient instructed to bring BG logs to each follow up   Patient encouraged to call for any BG/medication issues, concerns, or questions.    Orders Placed This Encounter   Procedures    Hemoglobin A1C    TSH    Basic metabolic panel    Microalbumin/creatinine urine ratio

## 2020-05-14 NOTE — PATIENT INSTRUCTIONS
PUMP SETTINGS:  Basal:  0000 - 1 u/hr  0400 - 0.9 u/hr  0800 - 1 u/hr  1200 - 0.5 u/hr    I:C ratio:  0000 - 1:7    ISF:    0000 - 35  0800 - 40  2000 - 35    Target:   0000 - 100-140    Active insulin time: 3 hours        Hypothyroidism       You have hypothyroidism. This means your thyroid gland is not making enough thyroid hormone. This hormone is vital to body growth and metabolism. If you dont make enough, many body processes slow down. This can cause symptoms throughout the body. Hypothyroidism can range from mild to severe. The most severe form is called myxedema.  There are a number of causes of hypothyroidism. A common cause is Hashimotos disease. This disease causes the bodys own immune system to attack the thyroid gland. When you have certain treatments, such as surgery to remove the thyroid gland, this can also cause hypothyroidism.  Symptoms of hypothyroidism can include:  · Fatigue  · Trouble concentrating or thinking clearly; forgetfulness  · Dry skin  · Hair loss  · Weight gain  · Low tolerance to cold  · Constipation  · Depression  · Personality changes  · Tingling or prickling of the hands or feet  · Heavy, absent, or irregular periods (women only)  Older adults may sometimes have other symptoms. These can include:  · Muscle aches and weakness  · Confusion  · Incontinence (unable to control urine or stool)  · Trouble moving around  · Falling  Treatment for hypothyroidism involves taking thyroid hormone pills daily. These pills replace the hormone your thyroid doesnt make. You will likely need to take a daily pill for the rest of your life. Tips for taking this medicine are given below.  Home care  Tips for taking your medicine  · Take your thyroid hormone pills as prescribed by your healthcare provider. This is most often 1 pill a day on an empty stomach. Use a pillbox labeled with the days of the week. This will help you remember to take your pill each day.  · Dont take products that  contain iron and calcium or antacids within 4 hours of taking your thyroid hormone pills.  · Dont take other medicines with your thyroid hormone pill without checking with your provider first.  · Tell your provider if you have any side effects from your medicines that bother you.  · Never change the dosage or stop taking your thyroid pills without talking to your provider first.  General care  · Always talk with your provider before trying other medicines or treatments for your thyroid problem.  · If you see other healthcare providers, be sure to let them know about your thyroid problem.  Follow-up care  See your healthcare provider for checkups as advised. You may need regular tests to check the level of thyroid hormone in your blood.  When to seek medical advice  Call your healthcare provider right away if any of these occur:  · New symptoms develop  · Symptoms return, continue, or worsen even after treatment  · Extreme fatigue  · Puffy hands, face, or feet  · Fast or irregular heartbeat  · Confusion  Call 911  Call 911 right away if any of these occur:  · Fainting  · Chest pain  · Shortness of breath or trouble breathing  Date Last Reviewed: 8/24/2015  © 6961-5611 [employer]. [address] [place] [address]. All rights reserved. This information is not intended as a substitute for professional medical care. Always follow your healthcare professional's instructions.

## 2020-05-15 ENCOUNTER — TELEPHONE (OUTPATIENT)
Dept: ENDOCRINOLOGY | Facility: CLINIC | Age: 65
End: 2020-05-15

## 2020-05-15 NOTE — TELEPHONE ENCOUNTER
----- Message from Yang Salmeron sent at 5/15/2020  4:32 PM CDT -----  Contact: Jose Alberto ([facility])  Type: Needs Medical Advice    Who Called:  Jose Alberto  [place] Call Back Number: [phone]  Additional Information: Caller would like a status update and recent chart notes. Please call to advise. Thanks!

## 2020-05-21 ENCOUNTER — TELEPHONE (OUTPATIENT)
Dept: ENDOCRINOLOGY | Facility: CLINIC | Age: 65
End: 2020-05-21

## 2020-05-21 NOTE — TELEPHONE ENCOUNTER
Spoke with pt and advised him we have faxed revised chart notes to [facility]  Have called Molly at [facility] and left another voicemail since lunch time to ensure receipt of fax

## 2020-05-21 NOTE — TELEPHONE ENCOUNTER
"Trudy Curran " Pt needs Trudy adjust her note (details in epic message) if she can addend and print and have faxed to [facility], deadline is today  "

## 2020-05-21 NOTE — TELEPHONE ENCOUNTER
----- Message from Bety Hurd sent at 5/21/2020  9:48 AM CDT -----    Type:  Pharmacy Calling to Clarify an RX    Name of Caller:  JoseA lberto  With [facility]  Prescription Name:   Supplies  For   dexcom  Glucose  monitor  Best Call Back Number:  [phone]  Additional Information:   Calling to  Speak to  A  Nurse    Time   Sensitive  //  Today  Is  The  Cut off // please call

## 2020-05-21 NOTE — TELEPHONE ENCOUNTER
----- Message from Pema Galaviz RN sent at 5/21/2020  1:19 PM CDT -----      ----- Message -----  From: Allegra Jolly  Sent: 5/21/2020  12:18 PM CDT  To: Cynthia Paul Staff    Type: Needs Medical Advice  Who Called:  Patient     Best Call Back Number: [phone]  Additional Information: patient requesting to speak with Magdy regarding the Dexcom 6, as of time of call Molly Giraldo still waiting for fax, corrected dr. notes

## 2020-05-21 NOTE — TELEPHONE ENCOUNTER
Spoke with pt and got message from [facility] who states Medicare will not consider pt for Dexcom as office note from 5/14/20 states Dietary Habits: sometimes skips breakfast    Asking if can be struck from note all together or addended as Medicare will not accept otherwise    Called [facility] x2 for guidance but no contact  Please document as appropriate and have staff fax to [employer] at  (or call Jose Alberto at number below)    Needs to be done today. Today is cut off/time sensitive

## 2020-05-21 NOTE — TELEPHONE ENCOUNTER
Faxed most current note to [facility]ne at    Pt and Jeanne aware  Pt very grateful for quick response and assistance

## 2020-05-29 ENCOUNTER — TELEPHONE (OUTPATIENT)
Dept: ENDOCRINOLOGY | Facility: CLINIC | Age: 65
End: 2020-05-29

## 2020-05-29 NOTE — TELEPHONE ENCOUNTER
----- Message from Stephanie Easley sent at 5/29/2020  9:10 AM CDT -----  Contact: Zeferino carvalho/ [facility]  Type: Needs Medical Advice  Who Called:  Zeferino Logan Call Back Number: [phone]  Additional Information: Pls call regarding medical supplies

## 2020-05-29 NOTE — TELEPHONE ENCOUNTER
Zeferino from [place] stated Medicare denied dexcom supplies due to:  Amendments and changes have to be noted under an amendment section with sign and date of what was changed (not just removed from note)   Noted on pg 9 of 10 under assessment/planpt has hypos because he often skips breakfast. And also intensive bg monitoring 6 x day - under plan sections is seen as a plan and what pt is actually doing    New appt needed - pt coming in on Wed of next week, 6/3/2020 at 1:00pm

## 2020-06-04 ENCOUNTER — OFFICE VISIT (OUTPATIENT)
Dept: ENDOCRINOLOGY | Facility: CLINIC | Age: 65
End: 2020-06-04
Payer: MEDICARE

## 2020-06-04 VITALS
HEART RATE: 74 BPM | HEIGHT: 70 IN | SYSTOLIC BLOOD PRESSURE: 128 MMHG | BODY MASS INDEX: 25.98 KG/M2 | WEIGHT: 181.44 LBS | DIASTOLIC BLOOD PRESSURE: 60 MMHG

## 2020-06-04 DIAGNOSIS — E78.5 HYPERLIPIDEMIA DUE TO TYPE 1 DIABETES MELLITUS: ICD-10-CM

## 2020-06-04 DIAGNOSIS — N18.30 TYPE 1 DIABETES MELLITUS WITH STAGE 3 CHRONIC KIDNEY DISEASE: ICD-10-CM

## 2020-06-04 DIAGNOSIS — I25.10 CORONARY ARTERY DISEASE DUE TO CALCIFIED CORONARY LESION: ICD-10-CM

## 2020-06-04 DIAGNOSIS — E10.319 TYPE 1 DIABETES MELLITUS WITH RETINOPATHY, MACULAR EDEMA PRESENCE UNSPECIFIED, UNSPECIFIED LATERALITY, UNSPECIFIED RETINOPATHY SEVERITY: ICD-10-CM

## 2020-06-04 DIAGNOSIS — E10.22 TYPE 1 DIABETES MELLITUS WITH STAGE 3 CHRONIC KIDNEY DISEASE: ICD-10-CM

## 2020-06-04 DIAGNOSIS — E11.59 HYPERTENSION ASSOCIATED WITH DIABETES: ICD-10-CM

## 2020-06-04 DIAGNOSIS — E10.649 HYPOGLYCEMIA DUE TO TYPE 1 DIABETES MELLITUS: ICD-10-CM

## 2020-06-04 DIAGNOSIS — E03.8 SUBCLINICAL HYPOTHYROIDISM: ICD-10-CM

## 2020-06-04 DIAGNOSIS — Z96.41 INSULIN PUMP STATUS: ICD-10-CM

## 2020-06-04 DIAGNOSIS — I25.84 CORONARY ARTERY DISEASE DUE TO CALCIFIED CORONARY LESION: ICD-10-CM

## 2020-06-04 DIAGNOSIS — I15.2 HYPERTENSION ASSOCIATED WITH DIABETES: ICD-10-CM

## 2020-06-04 DIAGNOSIS — E10.69 HYPERLIPIDEMIA DUE TO TYPE 1 DIABETES MELLITUS: ICD-10-CM

## 2020-06-04 DIAGNOSIS — E10.42 TYPE 1 DIABETES MELLITUS WITH DIABETIC POLYNEUROPATHY: Primary | ICD-10-CM

## 2020-06-04 PROCEDURE — 99214 OFFICE O/P EST MOD 30 MIN: CPT | Mod: S$PBB,,, | Performed by: NURSE PRACTITIONER

## 2020-06-04 PROCEDURE — 99999 PR PBB SHADOW E&M-EST. PATIENT-LVL V: ICD-10-PCS | Mod: PBBFAC,,, | Performed by: NURSE PRACTITIONER

## 2020-06-04 PROCEDURE — 99215 OFFICE O/P EST HI 40 MIN: CPT | Mod: PBBFAC,PN | Performed by: NURSE PRACTITIONER

## 2020-06-04 PROCEDURE — 99999 PR PBB SHADOW E&M-EST. PATIENT-LVL V: CPT | Mod: PBBFAC,,, | Performed by: NURSE PRACTITIONER

## 2020-06-04 PROCEDURE — 99214 PR OFFICE/OUTPT VISIT, EST, LEVL IV, 30-39 MIN: ICD-10-PCS | Mod: S$PBB,,, | Performed by: NURSE PRACTITIONER

## 2020-06-04 RX ORDER — PREDNISOLONE ACETATE 10 MG/ML
SUSPENSION/ DROPS OPHTHALMIC
Status: ON HOLD | COMMUNITY
Start: 2020-06-02 | End: 2020-07-07 | Stop reason: HOSPADM

## 2020-06-04 RX ORDER — CLOPIDOGREL BISULFATE 75 MG/1
TABLET ORAL
COMMUNITY
Start: 2020-05-26 | End: 2020-06-29 | Stop reason: SDUPTHER

## 2020-06-04 RX ORDER — CIPROFLOXACIN HYDROCHLORIDE 3 MG/ML
SOLUTION/ DROPS OPHTHALMIC
Status: ON HOLD | COMMUNITY
Start: 2020-06-02 | End: 2020-07-07 | Stop reason: HOSPADM

## 2020-06-04 NOTE — PROGRESS NOTES
CC: Mr. Narciso Becerra arrives today for management of Type 1  DM and review of chronic medical conditions, as listed in the visit diagnosis section of this encounter.     HPI: Mr. Narciso Becerra was diagnosed with Type 1  DM at age 18 after viral illness. Denies FH of DM. Denies hospitalizations due to DM.   Wears Dexcom G5.  He started insulin pump therapy with Omnipod in June 2015.  Has CAD, is s/p CABG in 2012 and follows with cardiology (Dr. Cedeño).  Follows with Dr. Estrada every 3-6 months for CKD.     Last seen by me 2 weeks ago. Unforturnately, [facility] is continuing to deny this patient his life saving Dexcom G6 supplies because of the fact that patient previously had documented hypoglycemia if he skipped a meal and because he is currently having to check BG frequently (because he is off of Dexcom). ** See telephone note documented 5/29/2020 for details.    BG monitoring 4x/day since not currently on Dexcom G6. Has not been able to get his Dexcom supplies from [employer] for the past 2 months.     Hypoglycemia: Occasionally. May occur if accidentally overestimated carb content. This includes late night dinner boluses.   Hypoglycemic Symptoms: dizziness, jitteriness and sweating   Hypoglycemia Treatment: juice    Exercise: No formal     Dietary Habits: Eats 3 meals/day. Rare snacking. Avoids sugary beverages.     Last DM education appointment: 2015         CURRENT DIABETIC MEDS: Humalog in Omnipod insulin pump  Glucometer type: Freestyle strips  Insulin back up plan: Lantus 17 units daily, Humalog per carb counting    Name of Device or Devices used by the patient: Omnipod  When did you start the current therapy you are on: 6/2015  Frequency of changing site/infusion set/tubing/cartridges: 3 days  Frequency of changing CGM: 10 days (when wearing)  Using bolus wizard: yes  Taking bolus with each meal: yes    PUMP SETTINGS:  Basal:  0000 - 1 u/hr  0400 - 0.9 u/hr  0800 - 1 u/hr  1200 - 0.5  "u/hr    I:C ratio:  0000 - 1:7    ISF:    0000 - 35  0800 - 40  2000 - 35    Target:   0000 - 100-140    Active insulin time: 3 hours      Last Eye Exam: 1/2020 + DR. Atif Smith. Past retina detachment x 3. Had recent reattachment on 6/1. H/o cataract.   Last Podiatry Exam: n/a    REVIEW OF SYSTEMS  Constitutional: no c/o weakness, fatigue, weight loss.   Eyes: c/o visual disturbances to L eye that he attributes to retina surgery.  Cardiac: no palpitations or chest pain.  Respiratory: no cough or dyspnea.  GI: no c/o abdominal pain or nausea  Skin: no lesions or rashes.  Neuro: + mild numbness, tingling to BLE.   Endocrine: denies polyphagia, polydipsia, polyuria      Personally reviewed Past Medical, Surgical, Social History.    Vital Signs  /60   Pulse 74   Ht 5' 10" (1.778 m)   Wt 82.3 kg (181 lb 7 oz)   BMI 26.03 kg/m²     Personally reviewed the below labs:    External lab dated 5/11/2020:            Hemoglobin A1C   Date Value Ref Range Status   05/07/2020 7.2 (H) 4.0 - 5.6 % Final     Comment:     ADA Screening Guidelines:  5.7-6.4%  Consistent with prediabetes  >or=6.5%  Consistent with diabetes  High levels of fetal hemoglobin interfere with the HbA1C  assay. Heterozygous hemoglobin variants (HbS, HgC, etc)do  not significantly interfere with this assay.   However, presence of multiple variants may affect accuracy.     01/17/2020 8.7 (H) 4.0 - 5.6 % Final     Comment:     ADA Screening Guidelines:  5.7-6.4%  Consistent with prediabetes  >or=6.5%  Consistent with diabetes  High levels of fetal hemoglobin interfere with the HbA1C  assay. Heterozygous hemoglobin variants (HbS, HgC, etc)do  not significantly interfere with this assay.   However, presence of multiple variants may affect accuracy.     11/07/2019 8.6 (H) 4.0 - 5.6 % Final     Comment:     ADA Screening Guidelines:  5.7-6.4%  Consistent with prediabetes  >or=6.5%  Consistent with diabetes  High levels of fetal hemoglobin " interfere with the HbA1C  assay. Heterozygous hemoglobin variants (HbS, HgC, etc)do  not significantly interfere with this assay.   However, presence of multiple variants may affect accuracy.         Chemistry        Component Value Date/Time     05/07/2020 0856    K 4.1 05/07/2020 0856     05/07/2020 0856    CO2 26 05/07/2020 0856    BUN 13 05/07/2020 0856    CREATININE 1.3 05/07/2020 0856    GLU 59 (L) 05/07/2020 0856        Component Value Date/Time    CALCIUM 10.1 05/07/2020 0856    ALKPHOS 76 05/07/2020 0856    AST 25 05/07/2020 0856    ALT 23 05/07/2020 0856    BILITOT 0.7 05/07/2020 0856          Lab Results   Component Value Date    CHOL 139 11/07/2019    CHOL 146 06/24/2019    CHOL 148 12/12/2018     Lab Results   Component Value Date    HDL 59 11/07/2019    HDL 65 06/24/2019    HDL 55 12/12/2018     Lab Results   Component Value Date    LDLCALC 60.0 (L) 11/07/2019    LDLCALC 62.8 (L) 06/24/2019    LDLCALC 60.6 (L) 12/12/2018     Lab Results   Component Value Date    TRIG 100 11/07/2019    TRIG 91 06/24/2019    TRIG 162 (H) 12/12/2018     Lab Results   Component Value Date    CHOLHDL 42.4 11/07/2019    CHOLHDL 44.5 06/24/2019    CHOLHDL 37.2 12/12/2018       Lab Results   Component Value Date    MICALBCREAT 13.1 06/24/2019     Lab Results   Component Value Date    TSH 7.114 (H) 05/07/2020       CrCl cannot be calculated (Patient's most recent lab result is older than the maximum 7 days allowed.).    No results found for: [id number]      PHYSICAL EXAMINATION  Constitutional: Appears well, no distress  Neck: Supple, trachea midline; no thyromegaly or nodules.   Respiratory: CTA, even and unlabored.  Cardiovascular: RRR, no murmurs, no carotid bruits. DP pulses  2+ bilaterally; no edema.    GI: active bowel sounds, no hernia  Skin: warm and dry; no lipohypertrophy, or acanthosis nigracans observed.  Neuro: DTR 1+ BUE/2+BLE. Previously, no loss of protective sensation via 10 gm monofilament.  Vibratory exam decreased bilaterally  Feet: appropriate footwear.       PUMP DOWNLOAD: see media file for details. Many glucoses are reasonable. Sporadic hyperglycemia noted. Occasional episodes of hypoglycemia noted, usually a few hours after bolus (which also occurs in early morning after eating late dinner).   Average TDD - 35.2 u  Basal: 47% (16.7 u)  Bolus: 53%         DEXCOM DOWNLOAD: currently not in use.   Average glucose:   % above goal:   % within goal:   % below goal:         A1c goal 7-8%, due to CKD, CAD       Assessment/Plan  1. Type 1 diabetes mellitus with diabetic polyneuropathy        -- Time sensitive. Complex. Many glucoses are reasonable. Patient believes that his hypoglycemia is due to accidentally overestimating CHO content and is unrelated to pump settings. Not enough of a pattern to warrant pump setting changes at this time.   -- I will no longer be prescribing to "[employer] as their requirements are unreasonably strict and do not match actual Medicare criteria. Will send CMN, demographic page, chart note to Dexcom rep, who will then assign to a different DME company.   -- resume Dexcom G6 once approved by new DME company.  -- intensive BG monitoring 4x/day until then.     -- Patient currently manages Type 1 diabetes mellitus with an intensive insulin regimen consisting of insulin pump therapy. The patient has been using SMBG for frequent glucose monitoring (4+ times per day). The patient requires a therapeutic CGM and is willing to use the CGM for the necessary frequent self-adjustments of insulin therapy. I will continue to have in-person visits every 3 months to assess adherence to his CGM regimen and diabetes treatment plan.       -- Discussed diagnosis of DM, A1c goals, progression of disease, long term complications and tx options.  Advised patient to check BG before activities, such as driving or exercise.  -- Reviewed hypoglycemia management: treat with 1/2 glass of juice, 1/2 can  regular coke, or 4 glucose tablets. Monitor and repeat treatment every 15 minutes until BG is >70 Then have a snack, which includes a complex carbohydrate and protein.    -- takes aspirin, statin   2. Type 1 diabetes mellitus with retinopathy, macular edema presence unspecified, unspecified laterality, unspecified retinopathy severity  -- keep follow ups   3. Type 1 diabetes mellitus with stage 3 chronic kidney disease  -- avoid hypoglycemia  -- follows with nephrology   4. Coronary artery disease due to calcified coronary lesion  -- avoid hypoglycemia  -- follows with cardiology    5. Hypertension associated with diabetes  -- controlled  -- defer med mgt to cardiology or nephrology   6. Hyperlipidemia due to type 1 diabetes mellitus  -- controlled  -- continue statin   7. Insulin pump adjustment -- download reviewed    8. Subclinical hypothyroidism -- uncontrolled since stopping medication.   -- has resumed levothyroxine 25 mcg since last TSH was obtained.  -- TSH in 6 weeks   9. Hypoglycemia associated with Type 1 DM  -- see A/P #1  -- recommend resuming Dexcom G6 when able         FOLLOW UP  Follow up in about 2 months (around 8/4/2020).   Patient instructed to bring BG logs to each follow up   Patient encouraged to call for any BG/medication issues, concerns, or questions.    No orders of the defined types were placed in this encounter.

## 2020-06-29 PROBLEM — I25.10 CORONARY ARTERY DISEASE DUE TO CALCIFIED CORONARY LESION: Status: ACTIVE | Noted: 2020-06-29

## 2020-06-29 PROBLEM — I25.84 CORONARY ARTERY DISEASE DUE TO CALCIFIED CORONARY LESION: Status: ACTIVE | Noted: 2020-06-29

## 2020-06-30 ENCOUNTER — CLINICAL SUPPORT (OUTPATIENT)
Dept: URGENT CARE | Facility: CLINIC | Age: 65
End: 2020-06-30
Payer: MEDICARE

## 2020-06-30 VITALS — TEMPERATURE: 99 F

## 2020-06-30 DIAGNOSIS — Z01.818 PREOPERATIVE TESTING: ICD-10-CM

## 2020-06-30 PROCEDURE — U0003 INFECTIOUS AGENT DETECTION BY NUCLEIC ACID (DNA OR RNA); SEVERE ACUTE RESPIRATORY SYNDROME CORONAVIRUS 2 (SARS-COV-2) (CORONAVIRUS DISEASE [COVID-19]), AMPLIFIED PROBE TECHNIQUE, MAKING USE OF HIGH THROUGHPUT TECHNOLOGIES AS DESCRIBED BY CMS-2020-01-R: HCPCS

## 2020-07-05 LAB — SARS-COV-2 RNA RESP QL NAA+PROBE: NEGATIVE

## 2020-07-06 ENCOUNTER — TELEPHONE (OUTPATIENT)
Dept: URGENT CARE | Facility: CLINIC | Age: 65
End: 2020-07-06

## 2020-07-28 DIAGNOSIS — E10.42 TYPE 1 DIABETES MELLITUS WITH DIABETIC POLYNEUROPATHY: ICD-10-CM

## 2020-07-28 RX ORDER — INSULIN LISPRO 100 [IU]/ML
INJECTION, SOLUTION INTRAVENOUS; SUBCUTANEOUS
Qty: 50 ML | Refills: 3 | Status: SHIPPED | OUTPATIENT
Start: 2020-07-28 | End: 2021-03-04 | Stop reason: SDUPTHER

## 2020-07-28 NOTE — TELEPHONE ENCOUNTER
----- Message from Robert Julien sent at 7/28/2020  1:29 PM CDT -----  Type:  RX Refill Request    Who Called: Ruslan   Refill or New Rx: New  RX Name and Strength:  HUMALOG U-100 INSULIN 100 unit/mL injection      How is the patient currently taking it? (ex. 1XDay):  Insulin pump  Is this a 30 day or 90 day RX: 90    Preferred Pharmacy with phone number:    [id number] - [place] [address] AT [facility] [address] [address]  Phone: [phone] Fax: [phone]          Local or Mail Order:  Local  Ordering Provider: Grisel Logan Call Back Number: [phone]  Additional Information:

## 2020-07-28 NOTE — TELEPHONE ENCOUNTER
----- Message from Maci Cano sent at 7/28/2020  1:54 PM CDT -----  Regarding: refill  Pt needing a refill    #HUMALOG U-100 INSULIN 100 unit/mL injection 50 mL 3    Sig: For continuous use in insulin pump. Max TDD 50 units  Sent to pharmacy as: HUMALOG U-100 INSULIN 100 unit/mL injection    # please send an Electronic Prescription     [id number] [address] [facility] [address] [facility] [address] [phone] (Phone)  [phone] (Fax)          Please contact pt# [phone]

## 2020-07-29 ENCOUNTER — OFFICE VISIT (OUTPATIENT)
Dept: FAMILY MEDICINE | Facility: CLINIC | Age: 65
End: 2020-07-29
Payer: MEDICARE

## 2020-07-29 VITALS
TEMPERATURE: 98 F | RESPIRATION RATE: 18 BRPM | DIASTOLIC BLOOD PRESSURE: 70 MMHG | HEIGHT: 70 IN | HEART RATE: 88 BPM | OXYGEN SATURATION: 97 % | WEIGHT: 182.13 LBS | SYSTOLIC BLOOD PRESSURE: 136 MMHG | BODY MASS INDEX: 26.07 KG/M2

## 2020-07-29 DIAGNOSIS — N53.12 PAINFUL EJACULATION: ICD-10-CM

## 2020-07-29 DIAGNOSIS — I73.9 PERIPHERAL VASCULAR DISEASE, UNSPECIFIED: ICD-10-CM

## 2020-07-29 DIAGNOSIS — N52.1 ERECTILE DISORDER DUE TO MEDICAL CONDITION IN MALE PATIENT: ICD-10-CM

## 2020-07-29 DIAGNOSIS — M54.31 SCIATIC PAIN, RIGHT: Primary | ICD-10-CM

## 2020-07-29 PROCEDURE — 99215 OFFICE O/P EST HI 40 MIN: CPT | Mod: PBBFAC,PO | Performed by: FAMILY MEDICINE

## 2020-07-29 PROCEDURE — 99999 PR PBB SHADOW E&M-EST. PATIENT-LVL V: CPT | Mod: PBBFAC,,, | Performed by: FAMILY MEDICINE

## 2020-07-29 PROCEDURE — 99999 PR PBB SHADOW E&M-EST. PATIENT-LVL V: ICD-10-PCS | Mod: PBBFAC,,, | Performed by: FAMILY MEDICINE

## 2020-07-29 PROCEDURE — 99214 OFFICE O/P EST MOD 30 MIN: CPT | Mod: S$PBB,,, | Performed by: FAMILY MEDICINE

## 2020-07-29 PROCEDURE — 99214 PR OFFICE/OUTPT VISIT, EST, LEVL IV, 30-39 MIN: ICD-10-PCS | Mod: S$PBB,,, | Performed by: FAMILY MEDICINE

## 2020-07-29 NOTE — PATIENT INSTRUCTIONS

## 2020-07-31 ENCOUNTER — TELEPHONE (OUTPATIENT)
Dept: FAMILY MEDICINE | Facility: CLINIC | Age: 65
End: 2020-07-31

## 2020-07-31 NOTE — TELEPHONE ENCOUNTER
Per Dr. Coronado patient to follow up in 2 months with himself or Lillian Jan/whomever has the earliest appointment. Offered 2 month appointment with Mrs. Montoya whom had earliest appointment, patient declined. Earliest appointment with Dr. Coronado scheduled. Patient agreed to appointment date and time. Appointment also added to wait list for earlier appointment.         ----- Message from Jeannie Rowley sent at 7/31/2020  3:35 PM CDT -----  Regarding: SOONER APPT  Contact: [phone]  Type:  Sooner Apoointment Request    Caller is requesting a sooner appointment.  Caller declined first available appointment listed below.  Caller will not accept being placed on the waitlist and is requesting a message be sent to doctor.    Name of Caller:  PT  When is the first available appointment?  11/19  Symptoms:  2 MTH FOLLOW UP  Best Call Back Number:  [phone]

## 2020-08-03 ENCOUNTER — LAB VISIT (OUTPATIENT)
Dept: LAB | Facility: HOSPITAL | Age: 65
End: 2020-08-03
Attending: NURSE PRACTITIONER
Payer: MEDICARE

## 2020-08-03 DIAGNOSIS — E10.42 TYPE 1 DIABETES MELLITUS WITH DIABETIC POLYNEUROPATHY: ICD-10-CM

## 2020-08-03 PROCEDURE — 82043 UR ALBUMIN QUANTITATIVE: CPT

## 2020-08-04 LAB
ALBUMIN/CREAT UR: 21.6 UG/MG (ref 0–30)
CREAT UR-MCNC: 97 MG/DL (ref 23–375)
MICROALBUMIN UR DL<=1MG/L-MCNC: 21 UG/ML

## 2020-08-06 ENCOUNTER — HOSPITAL ENCOUNTER (OUTPATIENT)
Dept: RADIOLOGY | Facility: HOSPITAL | Age: 65
Discharge: HOME | End: 2020-08-06
Attending: FAMILY MEDICINE
Payer: MEDICARE

## 2020-08-06 ENCOUNTER — TELEPHONE (OUTPATIENT)
Dept: FAMILY MEDICINE | Facility: CLINIC | Age: 65
End: 2020-08-06

## 2020-08-06 DIAGNOSIS — I73.9 CLAUDICATION OF BOTH LOWER EXTREMITIES: Chronic | ICD-10-CM

## 2020-08-06 DIAGNOSIS — N52.1 ERECTILE DISORDER DUE TO MEDICAL CONDITION IN MALE PATIENT: ICD-10-CM

## 2020-08-06 DIAGNOSIS — I73.9 PERIPHERAL VASCULAR DISEASE, UNSPECIFIED: ICD-10-CM

## 2020-08-06 DIAGNOSIS — M54.31 SCIATIC PAIN, RIGHT: ICD-10-CM

## 2020-08-06 PROCEDURE — 93922 US ARTERIAL LOWER EXTREMITY RIGHT WITH ABI (XPD): ICD-10-PCS | Mod: 26,,, | Performed by: RADIOLOGY

## 2020-08-06 PROCEDURE — 93922 UPR/L XTREMITY ART 2 LEVELS: CPT | Mod: 26,,, | Performed by: RADIOLOGY

## 2020-08-06 PROCEDURE — 93922 UPR/L XTREMITY ART 2 LEVELS: CPT | Mod: TC,PO

## 2020-08-06 PROCEDURE — 25500020 PHARM REV CODE 255: Mod: PO | Performed by: FAMILY MEDICINE

## 2020-08-06 PROCEDURE — 75635 CT ANGIO ABDOMINAL ARTERIES: CPT | Mod: TC,PO

## 2020-08-06 PROCEDURE — 93926 US ARTERIAL LOWER EXTREMITY RIGHT WITH ABI (XPD): ICD-10-PCS | Mod: 26,,, | Performed by: RADIOLOGY

## 2020-08-06 PROCEDURE — 75635 CT ANGIO ABDOMINAL ARTERIES: CPT | Mod: 26,,, | Performed by: RADIOLOGY

## 2020-08-06 PROCEDURE — 93926 LOWER EXTREMITY STUDY: CPT | Mod: 26,,, | Performed by: RADIOLOGY

## 2020-08-06 PROCEDURE — 75635 CTA RUNOFF ABD PEL BILAT LOWER EXT: ICD-10-PCS | Mod: 26,,, | Performed by: RADIOLOGY

## 2020-08-06 RX ADMIN — IOHEXOL 100 ML: 350 INJECTION, SOLUTION INTRAVENOUS at 02:08

## 2020-08-06 NOTE — TELEPHONE ENCOUNTER
Spoken with patient regarding anaphylaxis reaction after CTA abdomen runoff.  Patient has some mild please common iliacs.  Moderate disease superior of femoral arteries both legs.  Mild disease to moderate of tibial arteries.  Patent both dorsal pedis and plantar arteries bilateral.  Normal ultrasound / BERTRAND studies right leg.  Patient is aware results.   Advised to add Axid or Pepcid to Benadryl; as a high antihistamine for his new atopic reaction.  Patient also advised to call back if the lack of improvement.  This has been fully explained to the patient, who indicates understanding.

## 2020-08-16 NOTE — PROGRESS NOTES
Subjective:       Patient ID: Narciso Becerra is a 65 y.o. male.    Chief Complaint: Follow-up (6 month f/u )  HT has been controlled, no chest pains, no edema.red  DM I over 20 yrs, neuropathy, CKD III, and retinopathy.  He has been more active since he retired.    Hypertension  This is a chronic problem. The current episode started more than 1 year ago. The problem has been resolved since onset. The problem is controlled. Associated symptoms include shortness of breath. Pertinent negatives include no chest pain, headaches or palpitations. There are no associated agents to hypertension. Risk factors for coronary artery disease include sedentary lifestyle. The current treatment provides moderate improvement. Compliance problems include exercise.  Hypertensive end-organ damage includes kidney disease and PVD.   Follow-up  Chronicity: DM I, CAD, PAD w/ claudication, Hyperlipidemia, The current episode started more than 1 year ago. The problem has been gradually improving. Associated symptoms include myalgias and numbness. Pertinent negatives include no abdominal pain, arthralgias, chest pain, fatigue or headaches.   erectile dysfunction  Erectile Dysfunction: Patient complains of erectile dysfunction.  Onset of dysfunction was a few years ago and was gradual in onset.  Patient states the nature of difficulty is both attaining and maintaining erection. Full erections occur never. Partial erections occur with intercourse, with masturbation and on awakening. Libido is not affected. Risk factors for ED include cardiovascular disease, diabetes mellitus, neurologic disease (Diabetic neuropathy) and antihypertensive medications, Nitroglycerine. Patient denies history of cranial, spinal, or pelvic trauma, pelvic radiation and hypogonadism. Patient's expectations as to sexual function are not realistic..  Patient's description of relationship w/partner great.  Previous treatment of ED includes Viagra..       Review of  Systems   Constitutional: Negative for fatigue and unexpected weight change.   Eyes: Positive for visual disturbance.   Respiratory: Positive for shortness of breath. Negative for chest tightness.    Cardiovascular: Negative for chest pain, palpitations and leg swelling.   Gastrointestinal: Negative for abdominal pain.   Genitourinary: Positive for frequency.   Musculoskeletal: Positive for myalgias. Negative for arthralgias.        Claudication, and inner thighs pain at exertion.   Neurological: Positive for numbness. Negative for dizziness, syncope, light-headedness and headaches.        Paresthesia of his feet, and also his hands at night.       Patient Active Problem List   Diagnosis    CKD (chronic kidney disease) stage 3, GFR 30-59 ml/min    Type 1 diabetes mellitus with diabetic polyneuropathy    Peripheral neuropathy    Insulin pump status    Hypertension associated with diabetes    Hyperlipidemia due to type 1 diabetes mellitus    Angina of effort    Encounter for long-term (current) use of insulin    Adult situational stress disorder    Type 1 diabetes mellitus with retinopathy    Type 1 diabetes mellitus with stage 3 chronic kidney disease    Subclinical hypothyroidism    Left retinal detachment    Coronary artery disease, h/o CABG + stents    Claudication of both lower extremities       Objective:      Physical Exam  Vitals signs and nursing note reviewed.   Constitutional:       Appearance: He is well-developed.   Cardiovascular:      Rate and Rhythm: Normal rate and regular rhythm.      Pulses:           Dorsalis pedis pulses are 1+ on the right side and 1+ on the left side.        Posterior tibial pulses are 1+ on the right side and 1+ on the left side.      Heart sounds: Normal heart sounds.   Pulmonary:      Effort: Pulmonary effort is normal.      Breath sounds: Normal breath sounds.   Musculoskeletal:      Right foot: Normal range of motion. No deformity.      Left foot: Normal  range of motion. No deformity.   Feet:      Right foot:      Protective Sensation: 6 sites tested. 2 sites sensed.      Skin integrity: Callus present. No ulcer, blister or skin breakdown.      Left foot:      Protective Sensation: 6 sites tested. 2 sites sensed.      Skin integrity: Callus present. No ulcer, blister or skin breakdown.   Skin:     General: Skin is warm and dry.   Neurological:      Mental Status: He is alert and oriented to person, place, and time.         Lab Results   Component Value Date    WBC 5.13 07/07/2020    HGB 12.8 (L) 07/07/2020    HCT 35.9 (L) 07/07/2020     07/07/2020    CHOL 139 11/07/2019    TRIG 100 11/07/2019    HDL 59 11/07/2019    ALT 23 05/07/2020    AST 25 05/07/2020     (L) 08/03/2020    K 4.6 08/03/2020     08/03/2020    CREATININE 1.1 08/03/2020    BUN 11 08/03/2020    CO2 23 08/03/2020    TSH 3.579 08/03/2020    PSA 0.99 05/07/2020    HGBA1C 7.2 (H) 08/03/2020     The ASCVD Risk score (Yocasta JOSHUA Jr., et al., 2013) failed to calculate for the following reasons:    The patient has a prior MI or stroke diagnosis    Assessment:       1. Sciatic pain, right    2. Peripheral vascular disease, unspecified    3. Erectile disorder due to medical condition in male patient    4. Painful ejaculation        Plan:       Sciatic pain, right  -     Cancel: US Lower Extremity Arteries Right; Future; Expected date: 07/29/2020    Peripheral vascular disease, unspecified  -     CTA Runoff ABD Pel Bilat Lower EXT; Future; Expected date: 07/29/2020    Erectile disorder due to medical condition in male patient  -     Testosterone; Future; Expected date: 07/29/2020  -     CTA Runoff ABD Pel Bilat Lower EXT; Future; Expected date: 07/29/2020  -     Prolactin; Future; Expected date: 07/29/2020  -     Ambulatory referral/consult to Urology; Future; Expected date: 08/05/2020    Painful ejaculation      Patient readiness: acceptance and barriers:readiness and household issues    During  the course of the visit the patient was educated and counseled about the following:     Diabetes:  Suggested low cholesterol diet.  Encouraged aerobic exercise.  Discussed foot care.  Reminded to get yearly retinal exam.  Discussed ways to avoid symptomatic hypoglycemia.  Reminded to bring in blood sugar diary at next visit.  Follow up in 4 month or as needed.  Hypertension:   Medication: no change.  Screening for causes of secondary hypertension: GFR (chronic kidney disease).  Dietary sodium restriction.  Check blood pressures daily and record.  Follow up: 4 months and as needed.  :   increase fiber diet.    Goals: Diabetes: Maintain Hemoglobin A1C below 7 and Hypertension: Reduce Blood Pressure    Did patient meet goals/outcomes: Yes    The following self management tools provided: blood glucose log    Patient Instructions (the written plan) was given to the patient/family.     Time spent with patient: 30 minutes    Barriers to medications present (no )    Adverse reactions to current medications (no)    Over the counter medications reviewed (Yes)        40-minute visit. 20 minutes spent counseling patient on diet, exercise, and weight loss.  This has been fully explained to the patient, who indicates understanding.    Discussed health maintenance guidelines appropriate for age.  Discussed health maintenance guidelines appropriate for age.

## 2020-08-17 ENCOUNTER — LAB VISIT (OUTPATIENT)
Dept: LAB | Facility: HOSPITAL | Age: 65
End: 2020-08-17
Attending: INTERNAL MEDICINE
Payer: MEDICARE

## 2020-08-17 DIAGNOSIS — N18.2 CHRONIC KIDNEY DISEASE, STAGE II (MILD): Primary | ICD-10-CM

## 2020-08-17 LAB
BASOPHILS # BLD AUTO: 0.05 K/UL (ref 0–0.2)
BASOPHILS NFR BLD: 0.9 % (ref 0–1.9)
BILIRUB UR QL STRIP: NEGATIVE
CLARITY UR: CLEAR
COLOR UR: YELLOW
CREAT UR-MCNC: 130 MG/DL (ref 23–375)
DIFFERENTIAL METHOD: ABNORMAL
EOSINOPHIL # BLD AUTO: 0.1 K/UL (ref 0–0.5)
EOSINOPHIL NFR BLD: 2.6 % (ref 0–8)
ERYTHROCYTE [DISTWIDTH] IN BLOOD BY AUTOMATED COUNT: 13.1 % (ref 11.5–14.5)
GLUCOSE UR QL STRIP: NEGATIVE
HCT VFR BLD AUTO: 35.7 % (ref 40–54)
HGB BLD-MCNC: 12.1 G/DL (ref 14–18)
HGB UR QL STRIP: NEGATIVE
IMM GRANULOCYTES # BLD AUTO: 0.01 K/UL (ref 0–0.04)
IMM GRANULOCYTES NFR BLD AUTO: 0.2 % (ref 0–0.5)
KETONES UR QL STRIP: NEGATIVE
LEUKOCYTE ESTERASE UR QL STRIP: NEGATIVE
LYMPHOCYTES # BLD AUTO: 1.5 K/UL (ref 1–4.8)
LYMPHOCYTES NFR BLD: 26.7 % (ref 18–48)
MCH RBC QN AUTO: 32.4 PG (ref 27–31)
MCHC RBC AUTO-ENTMCNC: 33.9 G/DL (ref 32–36)
MCV RBC AUTO: 96 FL (ref 82–98)
MONOCYTES # BLD AUTO: 0.5 K/UL (ref 0.3–1)
MONOCYTES NFR BLD: 9.3 % (ref 4–15)
NEUTROPHILS # BLD AUTO: 3.3 K/UL (ref 1.8–7.7)
NEUTROPHILS NFR BLD: 60.3 % (ref 38–73)
NITRITE UR QL STRIP: NEGATIVE
NRBC BLD-RTO: 0 /100 WBC
PH UR STRIP: 6 [PH] (ref 5–8)
PLATELET # BLD AUTO: 261 K/UL (ref 150–350)
PMV BLD AUTO: 10.4 FL (ref 9.2–12.9)
PROT UR QL STRIP: NEGATIVE
PROT UR-MCNC: 11 MG/DL (ref 0–15)
PROT/CREAT UR: 0.08 MG/G{CREAT} (ref 0–0.2)
RBC # BLD AUTO: 3.74 M/UL (ref 4.6–6.2)
SP GR UR STRIP: 1.01 (ref 1–1.03)
URN SPEC COLLECT METH UR: NORMAL
WBC # BLD AUTO: 5.47 K/UL (ref 3.9–12.7)

## 2020-08-17 PROCEDURE — 82728 ASSAY OF FERRITIN: CPT | Mod: GA

## 2020-08-17 PROCEDURE — 81003 URINALYSIS AUTO W/O SCOPE: CPT | Mod: PO

## 2020-08-17 PROCEDURE — 85025 COMPLETE CBC W/AUTO DIFF WBC: CPT

## 2020-08-17 PROCEDURE — 80069 RENAL FUNCTION PANEL: CPT

## 2020-08-17 PROCEDURE — 83540 ASSAY OF IRON: CPT

## 2020-08-17 PROCEDURE — 84156 ASSAY OF PROTEIN URINE: CPT

## 2020-08-17 PROCEDURE — 36415 COLL VENOUS BLD VENIPUNCTURE: CPT | Mod: PO

## 2020-08-18 LAB
ALBUMIN SERPL BCP-MCNC: 4.3 G/DL (ref 3.5–5.2)
ANION GAP SERPL CALC-SCNC: 12 MMOL/L (ref 8–16)
BUN SERPL-MCNC: 15 MG/DL (ref 8–23)
CALCIUM SERPL-MCNC: 8.9 MG/DL (ref 8.7–10.5)
CHLORIDE SERPL-SCNC: 100 MMOL/L (ref 95–110)
CO2 SERPL-SCNC: 22 MMOL/L (ref 23–29)
CREAT SERPL-MCNC: 1.3 MG/DL (ref 0.5–1.4)
EST. GFR  (AFRICAN AMERICAN): >60 ML/MIN/1.73 M^2
EST. GFR  (NON AFRICAN AMERICAN): 56.5 ML/MIN/1.73 M^2
FERRITIN SERPL-MCNC: 49 NG/ML (ref 20–300)
GLUCOSE SERPL-MCNC: 178 MG/DL (ref 70–110)
IRON SERPL-MCNC: 100 UG/DL (ref 45–160)
PHOSPHATE SERPL-MCNC: 3.6 MG/DL (ref 2.7–4.5)
POTASSIUM SERPL-SCNC: 4.6 MMOL/L (ref 3.5–5.1)
SATURATED IRON: 24 % (ref 20–50)
SODIUM SERPL-SCNC: 134 MMOL/L (ref 136–145)
TOTAL IRON BINDING CAPACITY: 417 UG/DL (ref 250–450)
TRANSFERRIN SERPL-MCNC: 282 MG/DL (ref 200–375)

## 2020-08-19 ENCOUNTER — OFFICE VISIT (OUTPATIENT)
Dept: ENDOCRINOLOGY | Facility: CLINIC | Age: 65
End: 2020-08-19
Payer: MEDICARE

## 2020-08-19 VITALS
SYSTOLIC BLOOD PRESSURE: 124 MMHG | HEART RATE: 88 BPM | BODY MASS INDEX: 25.91 KG/M2 | HEIGHT: 70 IN | RESPIRATION RATE: 18 BRPM | DIASTOLIC BLOOD PRESSURE: 72 MMHG | WEIGHT: 181 LBS

## 2020-08-19 DIAGNOSIS — E10.319 TYPE 1 DIABETES MELLITUS WITH RETINOPATHY, MACULAR EDEMA PRESENCE UNSPECIFIED, UNSPECIFIED LATERALITY, UNSPECIFIED RETINOPATHY SEVERITY: ICD-10-CM

## 2020-08-19 DIAGNOSIS — N18.30 TYPE 1 DIABETES MELLITUS WITH STAGE 3 CHRONIC KIDNEY DISEASE: ICD-10-CM

## 2020-08-19 DIAGNOSIS — E10.42 TYPE 1 DIABETES MELLITUS WITH DIABETIC POLYNEUROPATHY: Primary | ICD-10-CM

## 2020-08-19 DIAGNOSIS — Z96.41 INSULIN PUMP STATUS: ICD-10-CM

## 2020-08-19 DIAGNOSIS — E10.22 TYPE 1 DIABETES MELLITUS WITH STAGE 3 CHRONIC KIDNEY DISEASE: ICD-10-CM

## 2020-08-19 DIAGNOSIS — E03.8 SUBCLINICAL HYPOTHYROIDISM: ICD-10-CM

## 2020-08-19 DIAGNOSIS — I15.2 HYPERTENSION ASSOCIATED WITH DIABETES: ICD-10-CM

## 2020-08-19 DIAGNOSIS — E11.59 HYPERTENSION ASSOCIATED WITH DIABETES: ICD-10-CM

## 2020-08-19 DIAGNOSIS — I25.84 CORONARY ARTERY DISEASE DUE TO CALCIFIED CORONARY LESION: ICD-10-CM

## 2020-08-19 DIAGNOSIS — E10.69 HYPERLIPIDEMIA DUE TO TYPE 1 DIABETES MELLITUS: ICD-10-CM

## 2020-08-19 DIAGNOSIS — E10.649 HYPOGLYCEMIA DUE TO TYPE 1 DIABETES MELLITUS: ICD-10-CM

## 2020-08-19 DIAGNOSIS — E78.5 HYPERLIPIDEMIA DUE TO TYPE 1 DIABETES MELLITUS: ICD-10-CM

## 2020-08-19 DIAGNOSIS — I25.10 CORONARY ARTERY DISEASE DUE TO CALCIFIED CORONARY LESION: ICD-10-CM

## 2020-08-19 PROCEDURE — 99215 OFFICE O/P EST HI 40 MIN: CPT | Mod: S$PBB,25,, | Performed by: NURSE PRACTITIONER

## 2020-08-19 PROCEDURE — 95251 PR GLUCOSE MONITOR, 72 HOUR, PHYS INTERP: ICD-10-PCS | Mod: ,,, | Performed by: NURSE PRACTITIONER

## 2020-08-19 PROCEDURE — 99999 PR PBB SHADOW E&M-EST. PATIENT-LVL V: ICD-10-PCS | Mod: PBBFAC,,, | Performed by: NURSE PRACTITIONER

## 2020-08-19 PROCEDURE — 99999 PR PBB SHADOW E&M-EST. PATIENT-LVL V: CPT | Mod: PBBFAC,,, | Performed by: NURSE PRACTITIONER

## 2020-08-19 PROCEDURE — 99215 OFFICE O/P EST HI 40 MIN: CPT | Mod: PBBFAC,PO | Performed by: NURSE PRACTITIONER

## 2020-08-19 PROCEDURE — 99215 PR OFFICE/OUTPT VISIT, EST, LEVL V, 40-54 MIN: ICD-10-PCS | Mod: S$PBB,25,, | Performed by: NURSE PRACTITIONER

## 2020-08-19 PROCEDURE — 95251 CONT GLUC MNTR ANALYSIS I&R: CPT | Mod: ,,, | Performed by: NURSE PRACTITIONER

## 2020-08-19 NOTE — PATIENT INSTRUCTIONS
PUMP SETTINGS:  Basal:  0000 - 1 u/hr  0400 - 0.9 u/hr  0800 - 1 u/hr  1000 - 0.5 u/hr    I:C ratio:  0000 - 1:7    ISF:    0000 - 35  0800 - 40  2000 - 35    Target:   0000 - 100-140    Active insulin time: 3 hours

## 2020-08-19 NOTE — PROGRESS NOTES
CC: Mr. Narciso Becerra arrives today for management of Type 1  DM and review of chronic medical conditions, as listed in the visit diagnosis section of this encounter.     HPI: Mr. Narciso Becerra was diagnosed with Type 1  DM at age 18 after viral illness. Denies FH of DM. Denies hospitalizations due to DM.   Wears Dexcom G5.  He started insulin pump therapy with Omnipod in June 2015.  Has CAD, is s/p CABG in 2012 and follows with cardiology (Dr. Cedeño).  Follows with Dr. Estrada every 3-6 months for CKD.     Patient was last seen by me in June.     He has since resumed Dexcom G6 after we discontinued his order with [facility] and went with another DME supplier.    BG monitoring per Dexcom G6.     Hypoglycemia: May occur if lunch is later than usual.   Hypoglycemic Symptoms: dizziness, jitteriness and sweating   Hypoglycemia Treatment: juice    Exercise: No formal. Worsening sciatic pain recently. Has had ablation. Now considering back surgery.     Dietary Habits: Eats 3 meals/day. Rare snacking. Avoids sugary beverages.     Last DM education appointment: 2015    He is compliant with taking levothyroxine.         CURRENT DIABETIC MEDS: Humalog in Omnipod insulin pump  Glucometer type: Freestyle strips  Insulin back up plan: Lantus 17 units daily, Humalog per carb counting    Name of Device or Devices used by the patient: Omnipod  When did you start the current therapy you are on: 6/2015  Frequency of changing site/infusion set/tubing/cartridges: 3 days  Frequency of changing CGM: 10 days  Using bolus wizard: yes  Taking bolus with each meal: yes    PUMP SETTINGS:  Basal:  0000 - 1 u/hr  0400 - 0.9 u/hr  0800 - 1 u/hr  1200 - 0.5 u/hr    I:C ratio:  0000 - 1:7    ISF:    0000 - 35  0800 - 40  2000 - 35    Target:   0000 - 100-140    Active insulin time: 3 hours      Last Eye Exam: 1/2020 + DR. Atif Smith. Past L retina detachment x 3. H/o cataract sx.   Last Podiatry Exam: n/a    REVIEW OF  "SYSTEMS  Constitutional: no c/o weakness, fatigue, weight loss.   Eyes: c/o chronic visual disturbances to L eye that he attributes to retina surgery.  Cardiac: no palpitations or chest pain.  Respiratory: no cough or dyspnea.  GI: no c/o abdominal pain or nausea  Skin: no lesions or rashes.  Neuro: + mild numbness, tingling to BLE.   Endocrine: denies polyphagia, polydipsia, polyuria      Personally reviewed Past Medical, Surgical, Social History.    Vital Signs  /72   Pulse 88   Resp 18   Ht 5' 10" (1.778 m)   Wt 82.1 kg (181 lb)   BMI 25.97 kg/m²     Personally reviewed the below labs:    External lab dated 5/11/2020:            Hemoglobin A1C   Date Value Ref Range Status   08/03/2020 7.2 (H) 4.0 - 5.6 % Final     Comment:     ADA Screening Guidelines:  5.7-6.4%  Consistent with prediabetes  >or=6.5%  Consistent with diabetes  High levels of fetal hemoglobin interfere with the HbA1C  assay. Heterozygous hemoglobin variants (HbS, HgC, etc)do  not significantly interfere with this assay.   However, presence of multiple variants may affect accuracy.     05/07/2020 7.2 (H) 4.0 - 5.6 % Final     Comment:     ADA Screening Guidelines:  5.7-6.4%  Consistent with prediabetes  >or=6.5%  Consistent with diabetes  High levels of fetal hemoglobin interfere with the HbA1C  assay. Heterozygous hemoglobin variants (HbS, HgC, etc)do  not significantly interfere with this assay.   However, presence of multiple variants may affect accuracy.     01/17/2020 8.7 (H) 4.0 - 5.6 % Final     Comment:     ADA Screening Guidelines:  5.7-6.4%  Consistent with prediabetes  >or=6.5%  Consistent with diabetes  High levels of fetal hemoglobin interfere with the HbA1C  assay. Heterozygous hemoglobin variants (HbS, HgC, etc)do  not significantly interfere with this assay.   However, presence of multiple variants may affect accuracy.         Chemistry        Component Value Date/Time     (L) 08/17/2020 1525    K 4.6 08/17/2020 " 1525     08/17/2020 1525    CO2 22 (L) 08/17/2020 1525    BUN 15 08/17/2020 1525    CREATININE 1.3 08/17/2020 1525     (H) 08/17/2020 1525        Component Value Date/Time    CALCIUM 8.9 08/17/2020 1525    ALKPHOS 76 05/07/2020 0856    AST 25 05/07/2020 0856    ALT 23 05/07/2020 0856    BILITOT 0.7 05/07/2020 0856          Lab Results   Component Value Date    CHOL 139 11/07/2019    CHOL 146 06/24/2019    CHOL 148 12/12/2018     Lab Results   Component Value Date    HDL 59 11/07/2019    HDL 65 06/24/2019    HDL 55 12/12/2018     Lab Results   Component Value Date    LDLCALC 60.0 (L) 11/07/2019    LDLCALC 62.8 (L) 06/24/2019    LDLCALC 60.6 (L) 12/12/2018     Lab Results   Component Value Date    TRIG 100 11/07/2019    TRIG 91 06/24/2019    TRIG 162 (H) 12/12/2018     Lab Results   Component Value Date    CHOLHDL 42.4 11/07/2019    CHOLHDL 44.5 06/24/2019    CHOLHDL 37.2 12/12/2018       Lab Results   Component Value Date    MICALBCREAT 21.6 08/03/2020     Lab Results   Component Value Date    TSH 3.579 08/03/2020       CrCl cannot be calculated (Unknown ideal weight.).    No results found for: [id number]      PHYSICAL EXAMINATION  Constitutional: Appears well, no distress  Neck: Supple, trachea midline; no thyromegaly or nodules.   Respiratory: CTA, even and unlabored.  Cardiovascular: RRR, no murmurs, no carotid bruits. DP pulses  2+ bilaterally; no edema.    GI: active bowel sounds, no hernia  Skin: warm and dry; no lipohypertrophy, or acanthosis nigracans observed.  Neuro: DTR 1+ BUE/2+BLE. Previously, no loss of protective sensation via 10 gm monofilament. Vibratory exam decreased bilaterally  Feet: appropriate footwear.       PUMP DOWNLOAD: See media file for details. Sporadic prandial excursions noted. Occasional episodes of hypoglycemia noted, sometimes when skipping a meal.   Average TDD: 31.8 u  Basal: 53% (16.7 u)  Bolus: 57%         DEXCOM DOWNLOAD: See media file for details. Most glucoses  are reasonable. Occasional prandial excursions. Glucoses tend to trend down around noon.   Average glucose: 148  Above 250 mg/dL: 3 %  181-250 mg/dL: 19 %   mg/dL: 76 %  54-69 mg/dL: 1 %  Below 54 mg/dL: <1 %        A1c goal 7-8%, due to CKD, CAD       Assessment/Plan  1. Type 1 diabetes mellitus with diabetic polyneuropathy        -- A1c remains stable. Appears to need lower basal rate leading up to lunch. reiterated the importance of entering CHO and BG at time of meal.   -- change basal rates as follows:  0000 - 1 u/hr  0400 - 0.9 u/hr  0800 - 1 u/hr  1000 - 0.5 u/hr  -- continue Dexcom G6     -- Patient currently manages Type 1 diabetes mellitus with an intensive insulin regimen consisting of insulin pump therapy. The patient has been using SMBG for frequent glucose monitoring (4+ times per day). The patient requires a therapeutic CGM and is willing to use the CGM for the necessary frequent self-adjustments of insulin therapy. I will continue to have in-person visits every 3 months to assess adherence to his CGM regimen and diabetes treatment plan.     -- Discussed diagnosis of DM, A1c goals, progression of disease, long term complications and tx options.  Advised patient to check BG before activities, such as driving or exercise.  -- Reviewed hypoglycemia management: treat with 1/2 glass of juice, 1/2 can regular coke, or 4 glucose tablets. Monitor and repeat treatment every 15 minutes until BG is >70 Then have a snack, which includes a complex carbohydrate and protein.    -- takes aspirin, statin   2. Type 1 diabetes mellitus with retinopathy, macular edema presence unspecified, unspecified laterality, unspecified retinopathy severity  -- keep follow ups   3. Type 1 diabetes mellitus with stage 3 chronic kidney disease  -- avoid hypoglycemia  -- follows with nephrology   4. Coronary artery disease due to calcified coronary lesion  -- avoid hypoglycemia  -- follows with cardiology    5. Hypertension  associated with diabetes  -- controlled  -- defer med mgt to cardiology or nephrology   6. Hyperlipidemia due to type 1 diabetes mellitus  -- controlled  -- continue statin   7. Insulin pump adjustment -- download reviewed    8. Subclinical hypothyroidism -- now controlled since resuming levothyroxine 25 mcg daily   9. Hypoglycemia associated with Type 1 DM  -- see A/P #1  -- continue Dexcom G6         FOLLOW UP  Follow up in about 3 months (around 11/19/2020).   Patient instructed to bring BG logs to each follow up   Patient encouraged to call for any BG/medication issues, concerns, or questions.    Orders Placed This Encounter   Procedures    Hemoglobin A1C    Comprehensive metabolic panel    Lipid Panel

## 2020-09-08 ENCOUNTER — PATIENT OUTREACH (OUTPATIENT)
Dept: ADMINISTRATIVE | Facility: OTHER | Age: 65
End: 2020-09-08

## 2020-09-08 NOTE — PROGRESS NOTES
LINKS immunization registry not responding  Care Everywhere updated  Health Maintenance updated  Chart reviewed for overdue Proactive [facility] Encounters ([facility]JI) health maintenance testing (CRS, Breast Ca, Diabetic Eye Exam)   Orders entered:N/A

## 2020-09-09 ENCOUNTER — OFFICE VISIT (OUTPATIENT)
Dept: UROLOGY | Facility: CLINIC | Age: 65
End: 2020-09-09
Payer: MEDICARE

## 2020-09-09 VITALS
WEIGHT: 176.13 LBS | HEART RATE: 77 BPM | DIASTOLIC BLOOD PRESSURE: 72 MMHG | BODY MASS INDEX: 25.21 KG/M2 | SYSTOLIC BLOOD PRESSURE: 153 MMHG | HEIGHT: 70 IN

## 2020-09-09 DIAGNOSIS — N40.1 BPH WITH URINARY OBSTRUCTION: Primary | ICD-10-CM

## 2020-09-09 DIAGNOSIS — I15.2 HYPERTENSION ASSOCIATED WITH DIABETES: ICD-10-CM

## 2020-09-09 DIAGNOSIS — E78.5 HYPERLIPIDEMIA DUE TO TYPE 1 DIABETES MELLITUS: ICD-10-CM

## 2020-09-09 DIAGNOSIS — E11.59 HYPERTENSION ASSOCIATED WITH DIABETES: ICD-10-CM

## 2020-09-09 DIAGNOSIS — N13.8 BPH WITH URINARY OBSTRUCTION: Primary | ICD-10-CM

## 2020-09-09 DIAGNOSIS — E10.69 HYPERLIPIDEMIA DUE TO TYPE 1 DIABETES MELLITUS: ICD-10-CM

## 2020-09-09 DIAGNOSIS — N52.01 ERECTILE DYSFUNCTION DUE TO ARTERIAL INSUFFICIENCY: ICD-10-CM

## 2020-09-09 DIAGNOSIS — I25.10 CORONARY ARTERY DISEASE DUE TO CALCIFIED CORONARY LESION: ICD-10-CM

## 2020-09-09 DIAGNOSIS — E10.42 TYPE 1 DIABETES MELLITUS WITH DIABETIC POLYNEUROPATHY: ICD-10-CM

## 2020-09-09 DIAGNOSIS — I25.84 CORONARY ARTERY DISEASE DUE TO CALCIFIED CORONARY LESION: ICD-10-CM

## 2020-09-09 PROCEDURE — 99204 OFFICE O/P NEW MOD 45 MIN: CPT | Mod: S$PBB,,, | Performed by: UROLOGY

## 2020-09-09 PROCEDURE — 99999 PR PBB SHADOW E&M-EST. PATIENT-LVL V: CPT | Mod: PBBFAC,,, | Performed by: UROLOGY

## 2020-09-09 PROCEDURE — 99204 PR OFFICE/OUTPT VISIT, NEW, LEVL IV, 45-59 MIN: ICD-10-PCS | Mod: S$PBB,,, | Performed by: UROLOGY

## 2020-09-09 PROCEDURE — 99215 OFFICE O/P EST HI 40 MIN: CPT | Mod: PBBFAC | Performed by: UROLOGY

## 2020-09-09 PROCEDURE — 99999 PR PBB SHADOW E&M-EST. PATIENT-LVL V: ICD-10-PCS | Mod: PBBFAC,,, | Performed by: UROLOGY

## 2020-09-09 NOTE — LETTER
September 9, 2020        Jefe Coronado MD  [address] [facility] [place] [address]             [facility] [address]  [address]  [facility] [address]  Phone: [phone]   Patient: Narciso Becerra   MR Number: [id number]   YOB: 1955   Date of Visit: 9/9/2020       Dear Dr. Coronado:    Thank you for referring Narciso Becerra to me for evaluation. Attached you will find relevant portions of my assessment and plan of care.    If you have questions, please do not hesitate to call me. I look forward to following Narciso Becerra along with you.    Sincerely,      Pablito Calderon MD            CC  No Recipients    Enclosure

## 2020-09-09 NOTE — PROGRESS NOTES
CHIEF COMPLAINT:    Mr. Becerra is a 65 y.o. male presenting for a consultation at the request of Dr. Coronado. Patient presents with ED.    PRESENTING ILLNESS:    Narciso Becerra is a 65 y.o. male who c/o ED.  Has been present for > 1 year.  His T is normal.  He has significant cardiac history and has an Rx for NTG.  He's tried Viagra in the past.  He's currently using a BRITTANY with poor results.    He has LUTS.  Has nocturia x 2-3.  + urgency.  Good FOS.  No straining.  No hematuria.  No dysuria. He's pleased with how he voids.    REVIEW OF SYSTEMS:    Narciso Becerra denies headache, blurred vision, fever, nausea, vomiting, chills, abdominal pain, chest pain, sore throat, bleeding per rectum, cough, SOB, recent loss of consciousness, recent mental status changes, seizures, dizziness, or upper or lower extremity weakness.    LILIBETH  1. 3  2. 2  3. 3  4. 2  5. 3      PATIENT HISTORY:    Past Medical History:   Diagnosis Date    Allergy     Anticoagulant long-term use     Anxiety     Cataract     CKD (chronic kidney disease) stage 3, GFR 30-59 ml/min 4/8/2014    COPD (chronic obstructive pulmonary disease)     Diabetes mellitus     insulin dependent    Diabetes mellitus type I     Encounter for blood transfusion     GERD (gastroesophageal reflux disease)     Peripheral neuropathy 6/23/2015    Retinopathy of both eyes     Subclinical hypothyroidism 8/16/2018       Past Surgical History:   Procedure Laterality Date    blood transfution      BREAST MASS EXCISION      CARDIAC CATHETERIZATION      CARDIAC SURGERY      march 2012, 5 vessels    CERVICAL FUSION  2018    CORONARY ANGIOGRAPHY N/A 7/7/2020    Procedure: ANGIOGRAM, CORONARY ARTERY;  Surgeon: Chai Grider MD;  Location: [facility];  Service: Cardiology;  Laterality: N/A;    CORONARY ANGIOGRAPHY INCLUDING BYPASS GRAFTS WITH CATHETERIZATION OF LEFT HEART N/A 7/7/2020    Procedure: ANGIOGRAM, CORONARY, INCLUDING BYPASS GRAFT, WITH LEFT HEART  CATHETERIZATION;  Surgeon: Chai Grider MD;  [facility] C[facility];  Service: Cardiology;  Laterality: N/A;    CORONARY ANGIOPLASTY      CORONARY ARTERY BYPASS GRAFT      EYE SURGERY      laser    RETINAL DETACHMENT SURGERY Left 2020       Family History   Problem Relation Age of Onset    Heart disease Mother     Cancer Mother         lung    Heart disease Paternal Grandfather     Cancer Brother         lung       Social History     Socioeconomic History    Marital status:      Spouse name: Not on file    Number of children: Not on file    Years of education: Not on file    Highest education level: Not on file   Occupational History     Employer: [employer]   Social Needs    Financial resource strain: Not on file    Food insecurity     Worry: Not on file     Inability: Not on file    Transportation needs     Medical: Not on file     Non-medical: Not on file   Tobacco Use    Smoking status: Former Smoker     Types: Cigars     Start date: 1978     Quit date: 1995     Years since quittin.7    Smokeless tobacco: Never Used   Substance and Sexual Activity    Alcohol use: Yes     Comment: weekly beers    Drug use: No    Sexual activity: Yes     Partners: Female   Lifestyle    Physical activity     Days per week: Not on file     Minutes per session: Not on file    Stress: Not on file   Relationships    Social connections     Talks on phone: Not on file     Gets together: Not on file     Attends Yazidi service: Not on file     Active member of club or organization: Not on file     Attends meetings of clubs or organizations: Not on file     Relationship status: Not on file   Other Topics Concern    Not on file   Social History Narrative    Not on file       Allergies:  Neomycin, Codeine, Doxycycline hyclate, Iodinated contrast media, and Shellfish containing products    Medications:    Current Outpatient Medications:     (No Medication Selected), Inject  "into the skin continuous., Disp: , Rfl:     ascorbic acid (VITAMIN C) 250 MG tablet, Take 1,000 mg by mouth 2 (two) times daily. , Disp: , Rfl:     aspirin (ECOTRIN) 81 MG EC tablet, Take 81 mg by mouth once daily., Disp: , Rfl:     atorvastatin (LIPITOR) 40 MG tablet, Take 1 tablet (40 mg total) by mouth once daily., Disp: 90 tablet, Rfl: 3    blood-glucose sensor (DEXCOM G4 SENSOR) Fabby, Please change sensor every 7 days, Disp: 13 Device, Rfl: 3    clopidogreL (PLAVIX) 75 mg tablet, Take 1 tablet (75 mg total) by mouth once daily., Disp: 90 tablet, Rfl: 3    escitalopram oxalate (LEXAPRO) 10 MG tablet, Take 1 tablet (10 mg total) by mouth once daily., Disp: 90 tablet, Rfl: 3    folic acid (FOLVITE) 1 MG tablet, TAKE 1 TABLET BY MOUTH DAILY, Disp: 90 tablet, Rfl: 3    FREESTYLE TEST Strp, CHECK BLOOD GLUCOSE 6 TIMES PER DAY - BEFORE MEALS AND AT BEDTIME. TO USE WITH OMNIPOD INSULIN PUMP PDM. DX CODE e10.42 (Patient not taking: Reported on 8/19/2020), Disp: 600 strip, Rfl: 3    glucagon (human recombinant) inj 1mg/mL kit, Inject 1 mL (1 mg total) into the muscle as needed., Disp: 1 kit, Rfl: 2    HUMALOG U-100 INSULIN 100 unit/mL injection, For continuous use in insulin pump. Max TDD 50 units, Disp: 50 mL, Rfl: 3    insulin glargine (LANTUS SOLOSTAR U-100 INSULIN) 100 unit/mL (3 mL) InPn pen, Inject 17 Units into the skin every evening. Use during cessation of pump therapy. The night before surgery, administer 13 units., Disp: 1 Box, Rfl: 6    insulin lispro (HUMALOG KWIKPEN INSULIN) 100 unit/mL pen, PLEASE SEE ATTACHED FOR DETAILED DIRECTIONS (Patient not taking: Reported on 8/19/2020), Disp: 15 Syringe, Rfl: 11    insulin pump cartridge (OMNIPOD INSULIN REFILL) Crtg, Pods for Omnipod insulin pump. Change every 3 days, Disp: 30 each, Rfl: 3    insulin syringe-needle U-100 (BD INSULIN SYRINGE ULT-FINE II) 1/2 mL 31 x 5/16" Syrg, Inject once daily (Patient not taking: Reported on 8/19/2020), Disp: 30 " "each, Rfl: 11    insulin syringe-needle U-100 1/2 mL 31 x 5/16" Syrg, 1 each by Misc.(Non-Drug; Combo Route) route 4 (four) times daily. (Patient not taking: Reported on 8/19/2020), Disp: 400 each, Rfl: 0    lancets Misc, Check blood glucose 6x/day. e10.42 (Patient not taking: Reported on 8/19/2020), Disp: 600 each, Rfl: 3    levothyroxine (SYNTHROID) 25 MCG tablet, Take 1 tablet (25 mcg total) by mouth before breakfast., Disp: 30 tablet, Rfl: 11    multivitamin capsule, Take 1 capsule by mouth once daily.  , Disp: , Rfl:     nitroGLYCERIN (NITROSTAT) 0.3 MG SL tablet, Place 0.3 mg under the tongue every 5 (five) minutes as needed.  , Disp: , Rfl:     pen needle, diabetic (BD ULTRA-FINE JUANA PEN NEEDLES) 32 gauge x 5/32" Ndle, USE 4 TIMES DAILY (Patient not taking: Reported on 8/19/2020), Disp: 200 each, Rfl: 3    PHYSICAL EXAMINATION:    The patient generally appears in good health, is appropriately interactive, and is in no apparent distress.     Eyes: anicteric sclerae, moist conjunctivae; no lid-lag; PERRLA     HENT: Atraumatic; oropharynx clear with moist mucous membranes and no mucosal ulcerations;normal hard and soft palate.  No evidence of lymphadenopathy.    Neck: Trachea midline.  No thyromegaly.    Musculoskeletal: No abnormal gait.    Skin: No lesions.    Mental: Cooperative with normal affect.  Is oriented to time, place, and person.    Neuro: Grossly intact.    Chest: Normal inspiratory effort.   No accessory muscles.  No audible wheezes.  Respirations symmetric on inspiration and expiration.    Heart: Regular rhythm.      Abdomen:  Soft, non-tender. No masses or organomegaly. Bladder is not palpable. No evidence of flank discomfort. No evidence of inguinal hernia.    Genitourinary: The penis is circumcised with no evidence of plaques or induration. The urethral meatus is normal. The testes, epididymides, and cord structures are normal in size and contour bilaterally. The scrotum is normal in " size and contour.    Normal anal sphincter tone. No rectal mass.    The prostate is 40 g. Normal landmarks. Lateral sulci. Median furrow intact.  No nodularity or induration. Seminal vesicles are normal.    Extremities: No clubbing, cyanosis, or edema      LABS:      Lab Results   Component Value Date    PSA 0.99 05/07/2020    PSA 0.46 06/15/2017    PSA 0.36 05/12/2015    PSADIAG 1.1 01/21/2019       IMPRESSION:    Encounter Diagnoses   Name Primary?    BPH with urinary obstruction Yes    Erectile dysfunction due to arterial insufficiency     Hypertension associated with diabetes     Hyperlipidemia due to type 1 diabetes mellitus     Type 1 diabetes mellitus with diabetic polyneuropathy     Coronary artery disease, h/o CABG + stents    HTN, controlled  DM, controlled  Hyperlipidemia,controlled      PLAN:    1. Discussed options for his ED.  He'd like to think about [facility] vs [facility].  He was given info on the [facility] website.  He'll discuss his wife and let us know.  2. Will observe his LUTS as they don't bother him.      Copy to: Cliff

## 2020-09-24 ENCOUNTER — TELEPHONE (OUTPATIENT)
Dept: UROLOGY | Facility: CLINIC | Age: 65
End: 2020-09-24

## 2020-09-24 NOTE — TELEPHONE ENCOUNTER
----- Message from Yoselyn Kirby sent at 9/24/2020  3:46 PM CDT -----  Narciso Becerra calling regarding Patient Advice (message) state he is ready to move forward with the next step. [phone]

## 2020-09-24 NOTE — TELEPHONE ENCOUNTER
Spoke to pt. Pt decided he wanted implant. Rescheduled appointment for Monday with . pt aware and verbalized understanding.

## 2020-10-01 ENCOUNTER — PATIENT MESSAGE (OUTPATIENT)
Dept: OTHER | Facility: OTHER | Age: 65
End: 2020-10-01

## 2020-10-06 ENCOUNTER — PATIENT MESSAGE (OUTPATIENT)
Dept: ENDOCRINOLOGY | Facility: CLINIC | Age: 65
End: 2020-10-06

## 2020-10-06 ENCOUNTER — TELEPHONE (OUTPATIENT)
Dept: ENDOCRINOLOGY | Facility: CLINIC | Age: 65
End: 2020-10-06

## 2020-10-15 ENCOUNTER — IMMUNIZATION (OUTPATIENT)
Dept: FAMILY MEDICINE | Facility: CLINIC | Age: 65
End: 2020-10-15
Payer: MEDICARE

## 2020-10-15 PROCEDURE — G0008 ADMIN INFLUENZA VIRUS VAC: HCPCS | Mod: PBBFAC,PO

## 2020-10-15 PROCEDURE — 90694 VACC AIIV4 NO PRSRV 0.5ML IM: CPT | Mod: PBBFAC,PO

## 2020-10-21 ENCOUNTER — PATIENT MESSAGE (OUTPATIENT)
Dept: ENDOCRINOLOGY | Facility: CLINIC | Age: 65
End: 2020-10-21

## 2020-11-17 ENCOUNTER — PATIENT MESSAGE (OUTPATIENT)
Dept: FAMILY MEDICINE | Facility: CLINIC | Age: 65
End: 2020-11-17

## 2020-11-17 ENCOUNTER — PATIENT MESSAGE (OUTPATIENT)
Dept: UROLOGY | Facility: CLINIC | Age: 65
End: 2020-11-17

## 2020-11-27 ENCOUNTER — OFFICE VISIT (OUTPATIENT)
Dept: FAMILY MEDICINE | Facility: CLINIC | Age: 65
End: 2020-11-27
Payer: MEDICARE

## 2020-11-27 VITALS
WEIGHT: 178.38 LBS | BODY MASS INDEX: 25.54 KG/M2 | OXYGEN SATURATION: 98 % | HEART RATE: 89 BPM | RESPIRATION RATE: 16 BRPM | HEIGHT: 70 IN | TEMPERATURE: 97 F | DIASTOLIC BLOOD PRESSURE: 50 MMHG | SYSTOLIC BLOOD PRESSURE: 126 MMHG

## 2020-11-27 DIAGNOSIS — N18.32 TYPE 1 DIABETES MELLITUS WITH STAGE 3B CHRONIC KIDNEY DISEASE: ICD-10-CM

## 2020-11-27 DIAGNOSIS — F43.20 ADULT SITUATIONAL STRESS DISORDER: Chronic | ICD-10-CM

## 2020-11-27 DIAGNOSIS — E03.9 HYPOTHYROIDISM, UNSPECIFIED TYPE: ICD-10-CM

## 2020-11-27 DIAGNOSIS — N18.31 STAGE 3A CHRONIC KIDNEY DISEASE: Primary | Chronic | ICD-10-CM

## 2020-11-27 DIAGNOSIS — Z23 NEED FOR ZOSTER VACCINE: ICD-10-CM

## 2020-11-27 DIAGNOSIS — F41.1 GAD (GENERALIZED ANXIETY DISORDER): ICD-10-CM

## 2020-11-27 DIAGNOSIS — Z23 NEED FOR PNEUMOCOCCAL VACCINATION: ICD-10-CM

## 2020-11-27 DIAGNOSIS — E10.22 TYPE 1 DIABETES MELLITUS WITH STAGE 3B CHRONIC KIDNEY DISEASE: ICD-10-CM

## 2020-11-27 PROCEDURE — G0009 ADMIN PNEUMOCOCCAL VACCINE: HCPCS | Mod: PBBFAC,PO

## 2020-11-27 PROCEDURE — 99999 PR PBB SHADOW E&M-EST. PATIENT-LVL V: CPT | Mod: PBBFAC,,, | Performed by: FAMILY MEDICINE

## 2020-11-27 PROCEDURE — 99213 OFFICE O/P EST LOW 20 MIN: CPT | Mod: S$PBB,,, | Performed by: FAMILY MEDICINE

## 2020-11-27 PROCEDURE — 99213 PR OFFICE/OUTPT VISIT, EST, LEVL III, 20-29 MIN: ICD-10-PCS | Mod: S$PBB,,, | Performed by: FAMILY MEDICINE

## 2020-11-27 PROCEDURE — 99999 PR PBB SHADOW E&M-EST. PATIENT-LVL V: ICD-10-PCS | Mod: PBBFAC,,, | Performed by: FAMILY MEDICINE

## 2020-11-27 PROCEDURE — 99215 OFFICE O/P EST HI 40 MIN: CPT | Mod: PBBFAC,PO,25 | Performed by: FAMILY MEDICINE

## 2020-11-27 RX ORDER — IBUPROFEN 100 MG/5ML
2000 SUSPENSION, ORAL (FINAL DOSE FORM) ORAL DAILY
COMMUNITY
End: 2022-05-02 | Stop reason: SDUPTHER

## 2020-11-27 RX ORDER — ESCITALOPRAM OXALATE 10 MG/1
10 TABLET ORAL DAILY
Qty: 90 TABLET | Refills: 3 | Status: SHIPPED | OUTPATIENT
Start: 2020-11-27 | End: 2021-12-02

## 2020-11-27 RX ORDER — ZOSTER VACCINE RECOMBINANT, ADJUVANTED 50 MCG/0.5
0.5 KIT INTRAMUSCULAR ONCE
Qty: 1 EACH | Refills: 1 | Status: SHIPPED | OUTPATIENT
Start: 2020-11-27 | End: 2020-11-27

## 2020-11-27 NOTE — PROGRESS NOTES
Subjective:       Patient ID: Narciso Becerra is a 65 y.o. male.    Chief Complaint: Diabetes    Diabetes  He presents for his follow-up diabetic visit. He has type 2 diabetes mellitus. Onset time: several. His disease course has been improving. Hypoglycemia symptoms include nervousness/anxiousness. Pertinent negatives for hypoglycemia include no dizziness or headaches. Associated symptoms include weakness. Pertinent negatives for diabetes include no blurred vision, no chest pain, no fatigue, no foot paresthesias, no foot ulcerations, no polydipsia, no polyphagia and no visual change. Symptoms are improving.     Review of Systems   Constitutional: Negative for fatigue and unexpected weight change.   Eyes: Negative for blurred vision.   Respiratory: Negative for chest tightness and shortness of breath.    Cardiovascular: Negative for chest pain, palpitations and leg swelling.   Gastrointestinal: Negative for abdominal pain.   Endocrine: Negative for polydipsia and polyphagia.   Musculoskeletal: Positive for back pain and myalgias. Negative for arthralgias.   Neurological: Positive for weakness and numbness. Negative for dizziness, syncope, light-headedness and headaches.   Psychiatric/Behavioral: The patient is nervous/anxious.        Patient Active Problem List   Diagnosis    CKD (chronic kidney disease) stage 3, GFR 30-59 ml/min    Type 1 diabetes mellitus with diabetic polyneuropathy    Peripheral neuropathy    Insulin pump status    Hypertension associated with diabetes    Hyperlipidemia due to type 1 diabetes mellitus    Angina of effort    Encounter for long-term (current) use of insulin    Adult situational stress disorder    Type 1 diabetes mellitus with retinopathy    Type 1 diabetes mellitus with stage 3 chronic kidney disease    Subclinical hypothyroidism    Left retinal detachment    Coronary artery disease, h/o CABG + stents    Claudication of both lower extremities    Erectile  dysfunction due to arterial insufficiency    BPH with urinary obstruction       Objective:      Physical Exam  Vitals signs and nursing note reviewed.   Constitutional:       Appearance: He is well-developed.   Cardiovascular:      Rate and Rhythm: Normal rate and regular rhythm.      Pulses:           Dorsalis pedis pulses are 3+ on the right side and 3+ on the left side.        Posterior tibial pulses are 3+ on the right side and 3+ on the left side.      Heart sounds: Normal heart sounds.   Pulmonary:      Effort: Pulmonary effort is normal.      Breath sounds: Normal breath sounds.   Musculoskeletal:      Right foot: Normal range of motion. No deformity.   Feet:      Right foot:      Protective Sensation: 6 sites tested. 6 sites sensed.      Skin integrity: No ulcer.      Left foot:      Protective Sensation: 6 sites tested. 6 sites sensed.      Skin integrity: No ulcer or blister.   Skin:     General: Skin is warm and dry.   Neurological:      Mental Status: He is alert and oriented to person, place, and time.         Lab Results   Component Value Date    WBC 5.47 08/17/2020    HGB 12.1 (L) 08/17/2020    HCT 35.7 (L) 08/17/2020     08/17/2020    CHOL 139 11/07/2019    TRIG 100 11/07/2019    HDL 59 11/07/2019    ALT 23 05/07/2020    AST 25 05/07/2020     (L) 08/17/2020    K 4.6 08/17/2020     08/17/2020    CREATININE 1.3 08/17/2020    BUN 15 08/17/2020    CO2 22 (L) 08/17/2020    TSH 3.579 08/03/2020    PSA 0.99 05/07/2020    HGBA1C 7.2 (H) 08/03/2020     The ASCVD Risk score (Yocasta JOSHUA Jr., et al., 2013) failed to calculate for the following reasons:    The patient has a prior MI or stroke diagnosis    Assessment:       1. Stage 3a chronic kidney disease    2. Hypothyroidism, unspecified type    3. Need for zoster vaccine    4. Need for pneumococcal vaccination    5. Adult situational stress disorder    6. ALEX (generalized anxiety disorder)        Plan:       Stage 3a chronic kidney  disease    Hypothyroidism, unspecified type  -     TSH; Future; Expected date: 11/27/2020  -     T3; Future; Expected date: 11/27/2020    Need for zoster vaccine  -     varicella-zoster [id number], PF, (SHINGRIX, PF,) 50 mcg/0.5 mL injection; Inject 0.5 mLs into the muscle once. for 1 dose  Dispense: 1 each; Refill: 1    Need for pneumococcal vaccination  -     (In Office Administered) Pneumococcal Polysaccharide Vaccine (23 Valent) (SQ/IM)    Adult situational stress disorder  -     escitalopram oxalate (LEXAPRO) 10 MG tablet; Take 1 tablet (10 mg total) by mouth once daily.  Dispense: 90 tablet; Refill: 3    ALEX (generalized anxiety disorder)  -     escitalopram oxalate (LEXAPRO) 10 MG tablet; Take 1 tablet (10 mg total) by mouth once daily.  Dispense: 90 tablet; Refill: 3    Type 1 diabetes mellitus with stage 3b chronic kidney disease      Patient education given on DM diet, exercises, portions, meals, and the patient expresses understanding and acceptance of instructions. Jefe Coronado 12/20/2020 11:12 PM      30-minute visit. 15 minutes spent counseling patient on diet, exercise, and weight loss.  This has been fully explained to the patient, who indicates understanding.    Discussed health maintenance guidelines appropriate for age.

## 2020-11-27 NOTE — PATIENT INSTRUCTIONS
Diabetes (General Information)  Diabetes is a long-term health problem. It means your body does not make enough insulin. Or it may mean that your body cannot use the insulin it makes. Insulin is a hormone in your body. It lets blood sugar (glucose) reach the cells in your body. All of your cells need glucose for fuel.  When you have diabetes, the glucose in your blood builds up because it cannot get into the cells. This buildup is called high blood sugar (hyperglycemia).  Your blood sugar level depends on several things. It depends on what kind of food you eat and how much of it you eat. It also depends on how much exercise you get, and how much insulin you have in your body. Eating too much of the wrong kinds of food or not taking diabetes medicine on time can cause high blood sugar. Infections can cause high blood sugar even if you are taking medicines correctly.  These things can also cause low blood sugar:  · Missing meals  · Not eating enough food  · Taking too much diabetes medicine  Diabetes can cause serious problems over time if you do not get treated. These problems include heart disease, stroke, kidney failure, and blindness. They also include nerve pain or loss of feeling in your legs and feet, and gangrene of the feet. By keeping your blood sugar under control you can prevent or delay these problems.  Normal blood sugar levels are 80 to 100 before a meal and less than 180 in the 1 to 2 hours after a meal.  Home care  Follow these guidelines when caring for yourself at home:  · Follow the diet your healthcare provider gives you. Take insulin or other diabetes medicine exactly as told to.  · Watch your blood sugar as you are told to. Keep a log of your results. This will help your provider change your medicines to keep your blood sugar under control.  · Try to reach your ideal weight. You may be able to cut back on or not have to take diabetes medicine if you eat the right foods and get exercise.  · Do  not smoke. Smoking worsens the effects of diabetes on your circulation. You are much more likely to have a heart attack if you have diabetes and you smoke.  · Take good care of your feet. If you have lost feeling in your feet, you may not see an injury or infection. Check your feet and between your toes at least once a week.  · Wear a medical alert bracelet or necklace, or carry a card in your wallet that says you have diabetes. This will help healthcare providers give you the right care if you get very ill and cannot tell them that you have diabetes.  Sick day plan  If you get a cold, the flu, or a bacterial or viral infection, take these steps:  · Look at your diabetes sick plan and call your healthcare provider as you were told to. You may need to call your provider right away if:  ¨ Your blood sugar is above 240 while taking your diabetes medicine  ¨ Your urine ketone levels are above normal or high  ¨ You have been vomiting more than 6 hours  ¨ You have trouble breathing or your breath ha s a fruity smell  ¨ You have a high fever  ¨ You have a fever for several days and you are not getting better  ¨ You get light-headed and are sleepier than usual  · Keep taking your diabetes pills (oral medicine) even if you have been vomiting and are feeling sick. Call your provider right away because you may need insulin to lower your blood sugar until you recover from your illness.  · Keep taking your insulin even if you have been vomiting and are feeling sick. Call your provider right away to ask if you need to change your insulin dose. This will depend on your blood sugar results.  · Check your blood sugar every 2 to 4 hours, or at least 4 times a day.  · Check your ketones often. If you are vomiting and having diarrhea, watch them more often.  · Do not skip meals. Try to eat small meals on a regular schedule. Do this even if you do not feel like eating.  · Drink water or other liquids that do not have caffeine or  calories. This will keep you from getting dehydrated. If you are nauseated or vomiting, takes small sips every 5 minutes. To prevent dehydration try to drink a cup (8 ounces) of fluids every hour while you are awake.  General care  Always bring a source of fast-acting sugar with you in case you have symptoms of low blood sugar (below 70). At the first sign of low blood sugar, eat or drink 15 to 20 grams of fast-acting sugar to raise your blood sugar. Examples are:  · 3 to 4 glucose tablets. You can buy these at most [employer].  · 4 ounces (1/2 cup) of regular (not diet) soft drinks  · 4 ounces (1/2 cup) of any fruit juice  · 8 ounces (1 cup) of milk  · 5 to 6 pieces of hard candy  · 1 tablespoon of honey  Check your blood sugar 15 minutes after treating yourself. If it is still below 70, take 15 to 20 more grams of fast-acting sugar. Test again in 15 minutes. If it returns to normal (70 or above), eat a snack or meal to keep your blood sugar in a safe range. If it stays low, call your doctor or go to an emergency room.  Follow-up care  Follow-up with your healthcare provider, or as advised. For more information about diabetes, visit the American Diabetes Association website at www.diabetes.org or call 906-486-3913.  When to seek medical advice  Call your healthcare provider right away if you have any of these symptoms of high blood sugar:  · Frequent urination  · Dizziness  · Drowsiness  · Thirst  · Headache  · Nausea or vomiting  · Abdominal pain  · Eyesight changes  · Fast breathing  · Confusion or loss of consciousness  Also call your provider right away if you have any of these signs of low blood sugar:  · Fatigue  · Headache  · Shakes  · Excess sweating  · Hunger  · Feeling anxious or restless  · Eyesight changes  · Drowsiness  · Weakness  · Confusion or loss of consciousness  Call 911  Call for emergency help right away if any of these occur:  · Chest pain or shortness of breath  · Dizziness or  fainting  · Weakness of an arm or leg or one side of the face  · Trouble speaking or seeing   Date Last Reviewed: 6/1/2016  © 2371-3944 [employer]. [address] [place] [address], [place] [address]. All rights reserved. This information is not intended as a substitute for professional medical care. Always follow your healthcare professional's instructions.

## 2020-11-28 ENCOUNTER — NURSE TRIAGE (OUTPATIENT)
Dept: ADMINISTRATIVE | Facility: CLINIC | Age: 65
End: 2020-11-28

## 2020-11-28 NOTE — TELEPHONE ENCOUNTER
Reason for Disposition   [1] Fever > 101 F (38.3 C) AND [2] age > 60    Additional Information   Negative: [1] Difficulty with breathing or swallowing AND [2] starts within 2 hours after injection   Negative: Difficult to awaken or acting confused (e.g., disoriented, slurred speech)   Negative: Unresponsive, passed out, or very weak   Negative: Sounds like a life-threatening emergency to the triager   Negative: Fever > 104 F (40 C)   Negative: Severe difficulty breathing (e.g., struggling for each breath, speaks in single words)   Negative: Difficult to awaken or acting confused (e.g., disoriented, slurred speech)   Negative: Bluish (or gray) lips or face now   Negative: Shock suspected (e.g., cold/pale/clammy skin, too weak to stand, low BP, rapid pulse)   Negative: Sounds like a life-threatening emergency to the triager   Negative: SEVERE or constant chest pain (Exception: mild central chest pain, present only when coughing)   Negative: MODERATE difficulty breathing (e.g., speaks in phrases, SOB even at rest, pulse 100-120)   Commented on: [1] Fever > 101 F (38.3 C) AND [2] age > 60     Directed patient to go to ED now for evaluation of TEMP 101.4 24 hrs following pneumonia vaccine 23.  Assured he will be evaluated for Covid 19 as well.    Protocols used: IMMUNIZATION [id number], CORONAVIRUS (COVID-19) - DIAGNOSED OR [id number]    Narciso states he has chills, a temp of 101.4, age 65, hx of DM 1 on pump, CKD, HTN.  He saw Dr Coronado yesterday and states he did have pneumococcal polysaccaride - 23 valent vaccination yesterday.  Wife Radha says this is his 2nd, and he has also had Prevnar 13.   Shingrix was ordered by Dr Coronado but not given yet, per Keven. Per [facility] triage protocol, recommend ED now for evaluation. Of note, aRdha is also concerned regarding possibility of Covid 19 infection.  Assured her that evaluation for same will be done in ED.  [facility] ED is where they will go.  Message to Dr Cliff MD pcp.  Please contact caller directly with any additional care advice.

## 2020-11-29 ENCOUNTER — PATIENT MESSAGE (OUTPATIENT)
Dept: FAMILY MEDICINE | Facility: CLINIC | Age: 65
End: 2020-11-29

## 2020-12-01 ENCOUNTER — PATIENT MESSAGE (OUTPATIENT)
Dept: FAMILY MEDICINE | Facility: CLINIC | Age: 65
End: 2020-12-01

## 2020-12-02 ENCOUNTER — LAB VISIT (OUTPATIENT)
Dept: LAB | Facility: HOSPITAL | Age: 65
End: 2020-12-02
Attending: NURSE PRACTITIONER
Payer: MEDICARE

## 2020-12-02 DIAGNOSIS — E10.42 TYPE 1 DIABETES MELLITUS WITH DIABETIC POLYNEUROPATHY: ICD-10-CM

## 2020-12-02 DIAGNOSIS — E03.9 HYPOTHYROIDISM, UNSPECIFIED TYPE: ICD-10-CM

## 2020-12-02 LAB
ALBUMIN SERPL BCP-MCNC: 3.8 G/DL (ref 3.5–5.2)
ALP SERPL-CCNC: 82 U/L (ref 55–135)
ALT SERPL W/O P-5'-P-CCNC: 18 U/L (ref 10–44)
ANION GAP SERPL CALC-SCNC: 12 MMOL/L (ref 8–16)
AST SERPL-CCNC: 22 U/L (ref 10–40)
BILIRUB SERPL-MCNC: 0.7 MG/DL (ref 0.1–1)
BUN SERPL-MCNC: 15 MG/DL (ref 8–23)
CALCIUM SERPL-MCNC: 9.7 MG/DL (ref 8.7–10.5)
CHLORIDE SERPL-SCNC: 101 MMOL/L (ref 95–110)
CHOLEST SERPL-MCNC: 152 MG/DL (ref 120–199)
CHOLEST/HDLC SERPL: 2.9 {RATIO} (ref 2–5)
CO2 SERPL-SCNC: 26 MMOL/L (ref 23–29)
CREAT SERPL-MCNC: 1.4 MG/DL (ref 0.5–1.4)
EST. GFR  (AFRICAN AMERICAN): 59.7 ML/MIN/1.73 M^2
EST. GFR  (NON AFRICAN AMERICAN): 51.6 ML/MIN/1.73 M^2
ESTIMATED AVG GLUCOSE: 174 MG/DL (ref 68–131)
GLUCOSE SERPL-MCNC: 156 MG/DL (ref 70–110)
HBA1C MFR BLD HPLC: 7.7 % (ref 4–5.6)
HDLC SERPL-MCNC: 52 MG/DL (ref 40–75)
HDLC SERPL: 34.2 % (ref 20–50)
LDLC SERPL CALC-MCNC: 82.2 MG/DL (ref 63–159)
NONHDLC SERPL-MCNC: 100 MG/DL
POTASSIUM SERPL-SCNC: 3.9 MMOL/L (ref 3.5–5.1)
PROT SERPL-MCNC: 7.4 G/DL (ref 6–8.4)
SODIUM SERPL-SCNC: 139 MMOL/L (ref 136–145)
T3 SERPL-MCNC: 84 NG/DL (ref 60–180)
TRIGL SERPL-MCNC: 89 MG/DL (ref 30–150)
TSH SERPL DL<=0.005 MIU/L-ACNC: 3.28 UIU/ML (ref 0.4–4)

## 2020-12-02 PROCEDURE — 36415 COLL VENOUS BLD VENIPUNCTURE: CPT | Mod: PO

## 2020-12-02 PROCEDURE — 80053 COMPREHEN METABOLIC PANEL: CPT

## 2020-12-02 PROCEDURE — 83036 HEMOGLOBIN GLYCOSYLATED A1C: CPT

## 2020-12-02 PROCEDURE — 80061 LIPID PANEL: CPT

## 2020-12-02 PROCEDURE — 84443 ASSAY THYROID STIM HORMONE: CPT

## 2020-12-02 PROCEDURE — 84480 ASSAY TRIIODOTHYRONINE (T3): CPT

## 2020-12-07 ENCOUNTER — OFFICE VISIT (OUTPATIENT)
Dept: ENDOCRINOLOGY | Facility: CLINIC | Age: 65
End: 2020-12-07
Payer: MEDICARE

## 2020-12-07 VITALS
DIASTOLIC BLOOD PRESSURE: 68 MMHG | HEIGHT: 70 IN | HEART RATE: 80 BPM | BODY MASS INDEX: 25.96 KG/M2 | SYSTOLIC BLOOD PRESSURE: 138 MMHG | WEIGHT: 181.31 LBS

## 2020-12-07 DIAGNOSIS — E10.42 TYPE 1 DIABETES MELLITUS WITH DIABETIC POLYNEUROPATHY: Primary | ICD-10-CM

## 2020-12-07 PROCEDURE — 95251 PR GLUCOSE MONITOR, 72 HOUR, PHYS INTERP: ICD-10-PCS | Mod: ,,, | Performed by: NURSE PRACTITIONER

## 2020-12-07 PROCEDURE — 95251 CONT GLUC MNTR ANALYSIS I&R: CPT | Mod: ,,, | Performed by: NURSE PRACTITIONER

## 2020-12-07 PROCEDURE — 99214 PR OFFICE/OUTPT VISIT, EST, LEVL IV, 30-39 MIN: ICD-10-PCS | Mod: S$PBB,,, | Performed by: NURSE PRACTITIONER

## 2020-12-07 PROCEDURE — 99999 PR PBB SHADOW E&M-EST. PATIENT-LVL V: CPT | Mod: PBBFAC,,, | Performed by: NURSE PRACTITIONER

## 2020-12-07 PROCEDURE — 99999 PR PBB SHADOW E&M-EST. PATIENT-LVL V: ICD-10-PCS | Mod: PBBFAC,,, | Performed by: NURSE PRACTITIONER

## 2020-12-07 PROCEDURE — 99214 OFFICE O/P EST MOD 30 MIN: CPT | Mod: S$PBB,,, | Performed by: NURSE PRACTITIONER

## 2020-12-07 PROCEDURE — 99215 OFFICE O/P EST HI 40 MIN: CPT | Mod: PBBFAC,PO | Performed by: NURSE PRACTITIONER

## 2020-12-07 NOTE — PROGRESS NOTES
CC: This 65 y.o. male presents for management of diabetes  and chronic conditions pending review including HTN, HLP, CKD 3, DM DR, hypothyroidism, CAD    HPI: He was diagnosed with T1DM in  at age 18 after viral illness. Denies FH of DM. Denies hospitalizations due to DM.   Wears Dexcom G5.  He started insulin pump therapy with Omnipod in June 2015.  Has CAD, is s/p CABG in 2012 and follows with cardiology (Dr. Grider).  Follows with Dr. Estrada every 3-6 months for CKD.        Patient of A Grisel, NP  Arrives today on Dexcom G6 and Omni Pod- See downloads in Media tab  Since his last viist he did have back surgery, steroid injections, etc  Readings were higher at that time  CGMS Interpretation :  Time in range 55%  hypoglycemia 3%  Has prandial excursions without pattern  Does have more hypoglycemia overnight with correction  Hypoglycemic Symptoms: dizziness, jitteriness and sweating   Hypoglycemia Treatment: juice     Exercise: Nothing currently, plans to start walking once clear from NS     Dietary Habits: Eats 2-3 meals/day. Snacking- pretzels   May skip breakfast     He is compliant with taking levothyroxine.      CURRENT DIABETIC MEDS: Humalog in Omnipod insulin pump  Glucometer type: Freestyle strips  Insulin back up plan: Lantus 17 units daily, Humalog per carb counting     Name of Device or Devices used by the patient: Omnipod  When did you start the current therapy you are on: 6/2015  Frequency of changing site/infusion set/tubing/cartridges: 3 days  Frequency of changing CGM: 10 days  Using bolus wizard: yes  Taking bolus with each meal: yes     PUMP SETTINGS:  Basal:  0000 - 1 u/hr  0400 - 0.9 u/hr  0800 - 1 u/hr  1200 - 0.5 u/hr     I:C ratio:  0000 - 1:7     ISF:    0000 - 35  0800 - 40  2000 - 35     Target:   0000 - 100-140     Active insulin time: 3 hours     Last Eye Exam: 11/2020 + DR. Atif Smith. Past L retina detachment x 3- follows w Dr Wu. H/o cataract sx.     ROS:   Gen: Appetite  good, no weight gain or loss, denies fatigue and weakness.  Skin: Skin is intact and heals well, no rashes, no hair changes  Eyes: Denies visual disturbances  Resp: no SOB or PALENCIA, no cough  Cardiac: No palpitations, chest pain, no edema   GI: No nausea or vomiting, diarrhea, constipation, or abdominal pain.  /GYN: No nocturia, burning or pain.   MS/Neuro: Denies numbness/ tingling in BLE; Gait steady, speech clear  Psych: Denies drug/ETOH abuse, no hx of depression.  Other systems: negative.    PE:  GENERAL: Well developed, well nourished.  PSYCH: AAOx3, appropriate mood and affect, pleasant expression, conversant, appears relaxed, well groomed.   EYES: Conjunctiva, corneas clear  NECK: Supple, trachea midline   VASCULAR: DP pulses +2/4 bilaterally, no edema.  NEURO: Gait steady  SKIN: Skin warm and dry no acanthosis nigracans.  FOOT EXAMINATION: 7/2020.      Lab Results   Component Value Date    MICALBCREAT 21.6 08/03/2020       Hemoglobin A1C   Date Value Ref Range Status   12/02/2020 7.7 (H) 4.0 - 5.6 % Final     Comment:     ADA Screening Guidelines:  5.7-6.4%  Consistent with prediabetes  >or=6.5%  Consistent with diabetes  High levels of fetal hemoglobin interfere with the HbA1C  assay. Heterozygous hemoglobin variants (HbS, HgC, etc)do  not significantly interfere with this assay.   However, presence of multiple variants may affect accuracy.     08/03/2020 7.2 (H) 4.0 - 5.6 % Final     Comment:     ADA Screening Guidelines:  5.7-6.4%  Consistent with prediabetes  >or=6.5%  Consistent with diabetes  High levels of fetal hemoglobin interfere with the HbA1C  assay. Heterozygous hemoglobin variants (HbS, HgC, etc)do  not significantly interfere with this assay.   However, presence of multiple variants may affect accuracy.     05/07/2020 7.2 (H) 4.0 - 5.6 % Final     Comment:     ADA Screening Guidelines:  5.7-6.4%  Consistent with prediabetes  >or=6.5%  Consistent with diabetes  High levels of fetal hemoglobin  interfere with the HbA1C  assay. Heterozygous hemoglobin variants (HbS, HgC, etc)do  not significantly interfere with this assay.   However, presence of multiple variants may affect accuracy.          ASSESSMENT and PLAN:      1.    Type 1 diabetes mellitus with diabetic polyneuropathy           -- A1c up slightly -- change pump as follows:  Target 100-120  MN ISF 45   -- continue Dexcom G6    -- takes aspirin, statin   2. Type 1 diabetes mellitus with retinopathy, macular edema presence unspecified, unspecified laterality, unspecified retinopathy severity  -- keep follow ups   3. Type 1 diabetes mellitus with stage 3 chronic kidney disease  -- avoid hypoglycemia  -- follows with nephrology   4. Coronary artery disease due to calcified coronary lesion  -- avoid hypoglycemia  -- follows with cardiology    5. Hypertension associated with diabetes  -- controlled  -- defer med mgt to cardiology or nephrology   6. Hyperlipidemia due to type 1 diabetes mellitus  -- controlled  -- continue statin   7. Insulin pump adjustment -- download reviewed    8. Subclinical hypothyroidism -- now controlled since resuming levothyroxine 25 mcg daily   9. Hypoglycemia associated with Type 1 DM  -- see A/P #1  -- continue Dexcom G6          Follow-up: in 3 months with lab prior

## 2020-12-07 NOTE — PATIENT INSTRUCTIONS
PUMP SETTINGS:  Basal:  0000 - 1 u/hr  0400 - 0.9 u/hr  0800 - 1 u/hr  1200 - 0.5 u/hr     I:C ratio:  0000 - 1:7     ISF:    0000 - 45  0800 - 40  2000 - 35     Target:   0000 - 100-120     Active insulin time: 3 hours

## 2020-12-11 ENCOUNTER — PATIENT MESSAGE (OUTPATIENT)
Dept: OTHER | Facility: OTHER | Age: 65
End: 2020-12-11

## 2020-12-24 ENCOUNTER — TELEPHONE (OUTPATIENT)
Dept: ENDOCRINOLOGY | Facility: CLINIC | Age: 65
End: 2020-12-24

## 2021-01-04 ENCOUNTER — PATIENT MESSAGE (OUTPATIENT)
Dept: ADMINISTRATIVE | Facility: HOSPITAL | Age: 66
End: 2021-01-04

## 2021-01-08 ENCOUNTER — PATIENT OUTREACH (OUTPATIENT)
Dept: ADMINISTRATIVE | Facility: OTHER | Age: 66
End: 2021-01-08

## 2021-01-11 ENCOUNTER — TELEPHONE (OUTPATIENT)
Dept: UROLOGY | Facility: CLINIC | Age: 66
End: 2021-01-11

## 2021-01-11 ENCOUNTER — OFFICE VISIT (OUTPATIENT)
Dept: UROLOGY | Facility: CLINIC | Age: 66
End: 2021-01-11
Payer: MEDICARE

## 2021-01-11 DIAGNOSIS — E10.42 TYPE 1 DIABETES MELLITUS WITH DIABETIC POLYNEUROPATHY: ICD-10-CM

## 2021-01-11 DIAGNOSIS — N52.01 ERECTILE DYSFUNCTION DUE TO ARTERIAL INSUFFICIENCY: Primary | ICD-10-CM

## 2021-01-11 DIAGNOSIS — E11.59 HYPERTENSION ASSOCIATED WITH DIABETES: ICD-10-CM

## 2021-01-11 DIAGNOSIS — N52.9 ERECTILE DYSFUNCTION: ICD-10-CM

## 2021-01-11 DIAGNOSIS — I25.10 CORONARY ARTERY DISEASE DUE TO CALCIFIED CORONARY LESION: ICD-10-CM

## 2021-01-11 DIAGNOSIS — I15.2 HYPERTENSION ASSOCIATED WITH DIABETES: ICD-10-CM

## 2021-01-11 DIAGNOSIS — E10.69 HYPERLIPIDEMIA DUE TO TYPE 1 DIABETES MELLITUS: ICD-10-CM

## 2021-01-11 DIAGNOSIS — E78.5 HYPERLIPIDEMIA DUE TO TYPE 1 DIABETES MELLITUS: ICD-10-CM

## 2021-01-11 DIAGNOSIS — I25.84 CORONARY ARTERY DISEASE DUE TO CALCIFIED CORONARY LESION: ICD-10-CM

## 2021-01-11 DIAGNOSIS — N40.1 BPH WITH URINARY OBSTRUCTION: ICD-10-CM

## 2021-01-11 DIAGNOSIS — N13.8 BPH WITH URINARY OBSTRUCTION: ICD-10-CM

## 2021-01-11 PROCEDURE — 99214 PR OFFICE/OUTPT VISIT, EST, LEVL IV, 30-39 MIN: ICD-10-PCS | Mod: S$PBB,,, | Performed by: UROLOGY

## 2021-01-11 PROCEDURE — 99999 PR PBB SHADOW E&M-EST. PATIENT-LVL III: CPT | Mod: PBBFAC,,, | Performed by: UROLOGY

## 2021-01-11 PROCEDURE — 99213 OFFICE O/P EST LOW 20 MIN: CPT | Mod: PBBFAC | Performed by: UROLOGY

## 2021-01-11 PROCEDURE — 99214 OFFICE O/P EST MOD 30 MIN: CPT | Mod: S$PBB,,, | Performed by: UROLOGY

## 2021-01-11 PROCEDURE — 99999 PR PBB SHADOW E&M-EST. PATIENT-LVL III: ICD-10-PCS | Mod: PBBFAC,,, | Performed by: UROLOGY

## 2021-01-11 RX ORDER — B-COMPLEX WITH VITAMIN C
1 TABLET ORAL DAILY
COMMUNITY

## 2021-01-12 RX ORDER — INSULIN ASPART 100 [IU]/ML
INJECTION, SOLUTION INTRAVENOUS; SUBCUTANEOUS
Qty: 50 ML | Refills: 3 | Status: SHIPPED | OUTPATIENT
Start: 2021-01-12 | End: 2021-07-26

## 2021-01-27 ENCOUNTER — PATIENT MESSAGE (OUTPATIENT)
Dept: SURGERY | Facility: HOSPITAL | Age: 66
End: 2021-01-27

## 2021-02-04 LAB
LEFT EYE DM RETINOPATHY: NEGATIVE
RIGHT EYE DM RETINOPATHY: NEGATIVE

## 2021-02-13 ENCOUNTER — LAB VISIT (OUTPATIENT)
Dept: LAB | Facility: HOSPITAL | Age: 66
End: 2021-02-13
Attending: INTERNAL MEDICINE
Payer: MEDICARE

## 2021-02-13 DIAGNOSIS — N18.2 CHRONIC KIDNEY DISEASE, STAGE II (MILD): Primary | ICD-10-CM

## 2021-02-13 DIAGNOSIS — E87.1 HYPOSMOLALITY SYNDROME: ICD-10-CM

## 2021-02-13 DIAGNOSIS — N18.9 CHRONIC KIDNEY DISEASE, UNSPECIFIED: ICD-10-CM

## 2021-02-13 LAB
ALBUMIN SERPL BCP-MCNC: 4.1 G/DL (ref 3.5–5.2)
ANION GAP SERPL CALC-SCNC: 12 MMOL/L (ref 8–16)
BASOPHILS # BLD AUTO: 0.03 K/UL (ref 0–0.2)
BASOPHILS NFR BLD: 0.5 % (ref 0–1.9)
BUN SERPL-MCNC: 12 MG/DL (ref 8–23)
CALCIUM SERPL-MCNC: 8.6 MG/DL (ref 8.7–10.5)
CHLORIDE SERPL-SCNC: 93 MMOL/L (ref 95–110)
CO2 SERPL-SCNC: 25 MMOL/L (ref 23–29)
CREAT SERPL-MCNC: 1.1 MG/DL (ref 0.5–1.4)
CREAT UR-MCNC: 52 MG/DL (ref 23–375)
DIFFERENTIAL METHOD: ABNORMAL
EOSINOPHIL # BLD AUTO: 0.2 K/UL (ref 0–0.5)
EOSINOPHIL NFR BLD: 3.3 % (ref 0–8)
ERYTHROCYTE [DISTWIDTH] IN BLOOD BY AUTOMATED COUNT: 13 % (ref 11.5–14.5)
EST. GFR  (AFRICAN AMERICAN): >60 ML/MIN/1.73 M^2
EST. GFR  (NON AFRICAN AMERICAN): >60 ML/MIN/1.73 M^2
GLUCOSE SERPL-MCNC: 150 MG/DL (ref 70–110)
HCT VFR BLD AUTO: 36.1 % (ref 40–54)
HGB BLD-MCNC: 12.3 G/DL (ref 14–18)
IMM GRANULOCYTES # BLD AUTO: 0.02 K/UL (ref 0–0.04)
IMM GRANULOCYTES NFR BLD AUTO: 0.3 % (ref 0–0.5)
LYMPHOCYTES # BLD AUTO: 1.8 K/UL (ref 1–4.8)
LYMPHOCYTES NFR BLD: 27.9 % (ref 18–48)
MCH RBC QN AUTO: 32.2 PG (ref 27–31)
MCHC RBC AUTO-ENTMCNC: 34.1 G/DL (ref 32–36)
MCV RBC AUTO: 95 FL (ref 82–98)
MONOCYTES # BLD AUTO: 0.6 K/UL (ref 0.3–1)
MONOCYTES NFR BLD: 9.6 % (ref 4–15)
NEUTROPHILS # BLD AUTO: 3.7 K/UL (ref 1.8–7.7)
NEUTROPHILS NFR BLD: 58.4 % (ref 38–73)
NRBC BLD-RTO: 0 /100 WBC
PHOSPHATE SERPL-MCNC: 3.2 MG/DL (ref 2.7–4.5)
PLATELET # BLD AUTO: 260 K/UL (ref 150–350)
PMV BLD AUTO: 9.8 FL (ref 9.2–12.9)
POTASSIUM SERPL-SCNC: 3.9 MMOL/L (ref 3.5–5.1)
PROT UR-MCNC: <7 MG/DL (ref 0–15)
PROT/CREAT UR: NORMAL MG/G{CREAT} (ref 0–0.2)
RBC # BLD AUTO: 3.82 M/UL (ref 4.6–6.2)
SODIUM SERPL-SCNC: 130 MMOL/L (ref 136–145)
WBC # BLD AUTO: 6.27 K/UL (ref 3.9–12.7)

## 2021-02-13 PROCEDURE — 80069 RENAL FUNCTION PANEL: CPT

## 2021-02-13 PROCEDURE — 36415 COLL VENOUS BLD VENIPUNCTURE: CPT | Mod: PO

## 2021-02-13 PROCEDURE — 85025 COMPLETE CBC W/AUTO DIFF WBC: CPT

## 2021-02-13 PROCEDURE — 84156 ASSAY OF PROTEIN URINE: CPT

## 2021-02-19 ENCOUNTER — PATIENT OUTREACH (OUTPATIENT)
Dept: ADMINISTRATIVE | Facility: HOSPITAL | Age: 66
End: 2021-02-19

## 2021-02-22 ENCOUNTER — PATIENT OUTREACH (OUTPATIENT)
Dept: ADMINISTRATIVE | Facility: HOSPITAL | Age: 66
End: 2021-02-22

## 2021-03-01 ENCOUNTER — TELEPHONE (OUTPATIENT)
Dept: ENDOCRINOLOGY | Facility: CLINIC | Age: 66
End: 2021-03-01

## 2021-03-01 DIAGNOSIS — E10.319 TYPE 1 DIABETES MELLITUS WITH RETINOPATHY, MACULAR EDEMA PRESENCE UNSPECIFIED, UNSPECIFIED LATERALITY, UNSPECIFIED RETINOPATHY SEVERITY: Primary | ICD-10-CM

## 2021-03-01 RX ORDER — INSULIN PUMP CONTROLLER
1 EACH MISCELLANEOUS
Qty: 30 EACH | Refills: 3 | Status: SHIPPED | OUTPATIENT
Start: 2021-03-01 | End: 2021-03-15

## 2021-03-04 ENCOUNTER — PATIENT OUTREACH (OUTPATIENT)
Dept: ADMINISTRATIVE | Facility: HOSPITAL | Age: 66
End: 2021-03-04

## 2021-03-04 DIAGNOSIS — E10.42 TYPE 1 DIABETES MELLITUS WITH DIABETIC POLYNEUROPATHY: ICD-10-CM

## 2021-03-04 RX ORDER — INSULIN LISPRO 100 [IU]/ML
INJECTION, SOLUTION INTRAVENOUS; SUBCUTANEOUS
Qty: 50 ML | Refills: 3 | Status: SHIPPED | OUTPATIENT
Start: 2021-03-04 | End: 2021-03-09 | Stop reason: SDUPTHER

## 2021-03-08 ENCOUNTER — PATIENT MESSAGE (OUTPATIENT)
Dept: ENDOCRINOLOGY | Facility: CLINIC | Age: 66
End: 2021-03-08

## 2021-03-08 RX ORDER — GLUCAGON 1 MG
1 VIAL (EA) INJECTION
Qty: 1 EACH | Refills: 3 | Status: SHIPPED | OUTPATIENT
Start: 2021-03-08 | End: 2022-03-08

## 2021-03-09 DIAGNOSIS — E10.42 TYPE 1 DIABETES MELLITUS WITH DIABETIC POLYNEUROPATHY: ICD-10-CM

## 2021-03-09 RX ORDER — INSULIN LISPRO 100 [IU]/ML
INJECTION, SOLUTION INTRAVENOUS; SUBCUTANEOUS
Qty: 50 ML | Refills: 1 | Status: SHIPPED | OUTPATIENT
Start: 2021-03-09 | End: 2021-03-10 | Stop reason: SDUPTHER

## 2021-03-10 ENCOUNTER — TELEPHONE (OUTPATIENT)
Dept: ENDOCRINOLOGY | Facility: CLINIC | Age: 66
End: 2021-03-10

## 2021-03-10 DIAGNOSIS — E10.42 TYPE 1 DIABETES MELLITUS WITH DIABETIC POLYNEUROPATHY: ICD-10-CM

## 2021-03-10 RX ORDER — INSULIN LISPRO 100 [IU]/ML
INJECTION, SOLUTION INTRAVENOUS; SUBCUTANEOUS
Qty: 50 ML | Refills: 1 | Status: SHIPPED | OUTPATIENT
Start: 2021-03-10 | End: 2021-08-20 | Stop reason: SDUPTHER

## 2021-03-15 ENCOUNTER — TELEPHONE (OUTPATIENT)
Dept: ENDOCRINOLOGY | Facility: CLINIC | Age: 66
End: 2021-03-15

## 2021-03-15 DIAGNOSIS — E10.42 TYPE 1 DIABETES MELLITUS WITH DIABETIC POLYNEUROPATHY: Primary | ICD-10-CM

## 2021-03-15 RX ORDER — INSULIN PUMP CART,CONT INF,RF
CARTRIDGE (EA) SUBCUTANEOUS
Qty: 30 EACH | Refills: 3 | Status: SHIPPED | OUTPATIENT
Start: 2021-03-15 | End: 2021-04-05

## 2021-03-29 ENCOUNTER — LAB VISIT (OUTPATIENT)
Dept: LAB | Facility: HOSPITAL | Age: 66
End: 2021-03-29
Attending: NURSE PRACTITIONER
Payer: MEDICARE

## 2021-03-29 DIAGNOSIS — E10.42 TYPE 1 DIABETES MELLITUS WITH DIABETIC POLYNEUROPATHY: ICD-10-CM

## 2021-03-29 LAB
ESTIMATED AVG GLUCOSE: 166 MG/DL (ref 68–131)
HBA1C MFR BLD: 7.4 % (ref 4–5.6)

## 2021-03-29 PROCEDURE — 83036 HEMOGLOBIN GLYCOSYLATED A1C: CPT | Performed by: NURSE PRACTITIONER

## 2021-03-29 PROCEDURE — 36415 COLL VENOUS BLD VENIPUNCTURE: CPT | Mod: PO | Performed by: NURSE PRACTITIONER

## 2021-04-05 ENCOUNTER — OFFICE VISIT (OUTPATIENT)
Dept: ENDOCRINOLOGY | Facility: CLINIC | Age: 66
End: 2021-04-05
Payer: MEDICARE

## 2021-04-05 VITALS
HEIGHT: 70 IN | HEART RATE: 72 BPM | WEIGHT: 178.81 LBS | SYSTOLIC BLOOD PRESSURE: 100 MMHG | BODY MASS INDEX: 25.6 KG/M2 | DIASTOLIC BLOOD PRESSURE: 60 MMHG

## 2021-04-05 DIAGNOSIS — E03.8 SUBCLINICAL HYPOTHYROIDISM: ICD-10-CM

## 2021-04-05 DIAGNOSIS — I25.10 CORONARY ARTERY DISEASE DUE TO CALCIFIED CORONARY LESION: ICD-10-CM

## 2021-04-05 DIAGNOSIS — I15.2 HYPERTENSION ASSOCIATED WITH DIABETES: ICD-10-CM

## 2021-04-05 DIAGNOSIS — E11.59 HYPERTENSION ASSOCIATED WITH DIABETES: ICD-10-CM

## 2021-04-05 DIAGNOSIS — Z46.81 INSULIN PUMP FITTING OR ADJUSTMENT: ICD-10-CM

## 2021-04-05 DIAGNOSIS — E10.42 TYPE 1 DIABETES MELLITUS WITH DIABETIC POLYNEUROPATHY: Primary | ICD-10-CM

## 2021-04-05 DIAGNOSIS — E10.649 HYPOGLYCEMIA DUE TO TYPE 1 DIABETES MELLITUS: ICD-10-CM

## 2021-04-05 DIAGNOSIS — E78.5 HYPERLIPIDEMIA DUE TO TYPE 1 DIABETES MELLITUS: ICD-10-CM

## 2021-04-05 DIAGNOSIS — E10.69 HYPERLIPIDEMIA DUE TO TYPE 1 DIABETES MELLITUS: ICD-10-CM

## 2021-04-05 DIAGNOSIS — N18.32 TYPE 1 DIABETES MELLITUS WITH STAGE 3B CHRONIC KIDNEY DISEASE: ICD-10-CM

## 2021-04-05 DIAGNOSIS — I25.84 CORONARY ARTERY DISEASE DUE TO CALCIFIED CORONARY LESION: ICD-10-CM

## 2021-04-05 DIAGNOSIS — E10.22 TYPE 1 DIABETES MELLITUS WITH STAGE 3B CHRONIC KIDNEY DISEASE: ICD-10-CM

## 2021-04-05 DIAGNOSIS — E10.319 TYPE 1 DIABETES MELLITUS WITH RETINOPATHY, MACULAR EDEMA PRESENCE UNSPECIFIED, UNSPECIFIED LATERALITY, UNSPECIFIED RETINOPATHY SEVERITY: ICD-10-CM

## 2021-04-05 PROCEDURE — 95251 PR GLUCOSE MONITOR, 72 HOUR, PHYS INTERP: ICD-10-PCS | Mod: ,,, | Performed by: NURSE PRACTITIONER

## 2021-04-05 PROCEDURE — 99999 PR PBB SHADOW E&M-EST. PATIENT-LVL V: CPT | Mod: PBBFAC,,, | Performed by: NURSE PRACTITIONER

## 2021-04-05 PROCEDURE — 99999 PR PBB SHADOW E&M-EST. PATIENT-LVL V: ICD-10-PCS | Mod: PBBFAC,,, | Performed by: NURSE PRACTITIONER

## 2021-04-05 PROCEDURE — 99215 OFFICE O/P EST HI 40 MIN: CPT | Mod: S$PBB,,, | Performed by: NURSE PRACTITIONER

## 2021-04-05 PROCEDURE — 95251 CONT GLUC MNTR ANALYSIS I&R: CPT | Mod: ,,, | Performed by: NURSE PRACTITIONER

## 2021-04-05 PROCEDURE — 99215 PR OFFICE/OUTPT VISIT, EST, LEVL V, 40-54 MIN: ICD-10-PCS | Mod: S$PBB,,, | Performed by: NURSE PRACTITIONER

## 2021-04-05 PROCEDURE — 99215 OFFICE O/P EST HI 40 MIN: CPT | Mod: PBBFAC,PO | Performed by: NURSE PRACTITIONER

## 2021-04-05 RX ORDER — INSULIN PUMP CONTROLLER
1 EACH MISCELLANEOUS CONTINUOUS
Qty: 1 EACH | Refills: 0 | Status: SHIPPED | OUTPATIENT
Start: 2021-04-05

## 2021-04-05 RX ORDER — INSULIN PUMP CONTROLLER
1 EACH MISCELLANEOUS CONTINUOUS
Qty: 30 EACH | Refills: 3 | Status: SHIPPED | OUTPATIENT
Start: 2021-04-05 | End: 2022-04-20

## 2021-04-06 ENCOUNTER — TELEPHONE (OUTPATIENT)
Dept: ADMINISTRATIVE | Facility: HOSPITAL | Age: 66
End: 2021-04-06

## 2021-04-14 ENCOUNTER — PATIENT MESSAGE (OUTPATIENT)
Dept: ENDOCRINOLOGY | Facility: CLINIC | Age: 66
End: 2021-04-14

## 2021-04-30 ENCOUNTER — PATIENT MESSAGE (OUTPATIENT)
Dept: SURGERY | Facility: HOSPITAL | Age: 66
End: 2021-04-30

## 2021-04-30 ENCOUNTER — LAB VISIT (OUTPATIENT)
Dept: LAB | Facility: HOSPITAL | Age: 66
End: 2021-04-30
Attending: OTOLARYNGOLOGY
Payer: MEDICARE

## 2021-04-30 DIAGNOSIS — Z01.818 PREOP EXAMINATION: Primary | ICD-10-CM

## 2021-04-30 LAB
BUN SERPL-MCNC: 11 MG/DL (ref 8–23)
CREAT SERPL-MCNC: 1.1 MG/DL (ref 0.5–1.4)
EST. GFR  (AFRICAN AMERICAN): >60 ML/MIN/1.73 M^2
EST. GFR  (NON AFRICAN AMERICAN): >60 ML/MIN/1.73 M^2

## 2021-04-30 PROCEDURE — 82565 ASSAY OF CREATININE: CPT | Performed by: OTOLARYNGOLOGY

## 2021-04-30 PROCEDURE — 84520 ASSAY OF UREA NITROGEN: CPT | Performed by: OTOLARYNGOLOGY

## 2021-04-30 PROCEDURE — 36415 COLL VENOUS BLD VENIPUNCTURE: CPT | Mod: PO | Performed by: OTOLARYNGOLOGY

## 2021-05-06 DIAGNOSIS — G90.3 NEUROGENIC ORTHOSTATIC HYPOTENSION: ICD-10-CM

## 2021-05-06 RX ORDER — FOLIC ACID 1 MG/1
TABLET ORAL
Qty: 90 TABLET | Refills: 3 | Status: SHIPPED | OUTPATIENT
Start: 2021-05-06 | End: 2022-05-02

## 2021-05-11 ENCOUNTER — PATIENT MESSAGE (OUTPATIENT)
Dept: UROLOGY | Facility: CLINIC | Age: 66
End: 2021-05-11

## 2021-05-12 ENCOUNTER — HOSPITAL ENCOUNTER (OUTPATIENT)
Dept: RADIOLOGY | Facility: HOSPITAL | Age: 66
Discharge: HOME | End: 2021-05-12
Attending: OTOLARYNGOLOGY
Payer: MEDICARE

## 2021-05-12 DIAGNOSIS — H91.21 HEARING LOSS, SUDDEN, RIGHT: ICD-10-CM

## 2021-05-12 PROCEDURE — 25500020 PHARM REV CODE 255: Mod: PO | Performed by: OTOLARYNGOLOGY

## 2021-05-12 PROCEDURE — 70553 MRI IAC/TEMPORAL BONES W W/O CONTRAST: ICD-10-PCS | Mod: 26,,, | Performed by: RADIOLOGY

## 2021-05-12 PROCEDURE — 70553 MRI BRAIN STEM W/O & W/DYE: CPT | Mod: 26,,, | Performed by: RADIOLOGY

## 2021-05-12 PROCEDURE — A9585 GADOBUTROL INJECTION: HCPCS | Mod: PO | Performed by: OTOLARYNGOLOGY

## 2021-05-12 PROCEDURE — 70553 MRI BRAIN STEM W/O & W/DYE: CPT | Mod: TC,PO

## 2021-05-12 RX ORDER — GADOBUTROL 604.72 MG/ML
7 INJECTION INTRAVENOUS
Status: COMPLETED | OUTPATIENT
Start: 2021-05-12 | End: 2021-05-12

## 2021-05-12 RX ADMIN — GADOBUTROL 7 ML: 604.72 INJECTION INTRAVENOUS at 10:05

## 2021-05-25 ENCOUNTER — LAB VISIT (OUTPATIENT)
Dept: LAB | Facility: HOSPITAL | Age: 66
End: 2021-05-25
Attending: FAMILY MEDICINE
Payer: MEDICARE

## 2021-05-25 ENCOUNTER — OFFICE VISIT (OUTPATIENT)
Dept: FAMILY MEDICINE | Facility: CLINIC | Age: 66
End: 2021-05-25
Payer: MEDICARE

## 2021-05-25 ENCOUNTER — PATIENT MESSAGE (OUTPATIENT)
Dept: SURGERY | Facility: HOSPITAL | Age: 66
End: 2021-05-25

## 2021-05-25 VITALS
SYSTOLIC BLOOD PRESSURE: 134 MMHG | DIASTOLIC BLOOD PRESSURE: 66 MMHG | HEART RATE: 84 BPM | HEIGHT: 70 IN | TEMPERATURE: 98 F | RESPIRATION RATE: 16 BRPM | WEIGHT: 181 LBS | OXYGEN SATURATION: 98 % | BODY MASS INDEX: 25.91 KG/M2

## 2021-05-25 DIAGNOSIS — I63.89 OTHER CEREBRAL INFARCTION: ICD-10-CM

## 2021-05-25 DIAGNOSIS — N18.32 TYPE 1 DIABETES MELLITUS WITH STAGE 3B CHRONIC KIDNEY DISEASE: Primary | ICD-10-CM

## 2021-05-25 DIAGNOSIS — E10.22 TYPE 1 DIABETES MELLITUS WITH STAGE 3B CHRONIC KIDNEY DISEASE: Primary | ICD-10-CM

## 2021-05-25 DIAGNOSIS — N52.01 ERECTILE DYSFUNCTION DUE TO ARTERIAL INSUFFICIENCY: ICD-10-CM

## 2021-05-25 DIAGNOSIS — I25.10 CORONARY ARTERY DISEASE DUE TO CALCIFIED CORONARY LESION: ICD-10-CM

## 2021-05-25 DIAGNOSIS — E11.59 HYPERTENSION ASSOCIATED WITH DIABETES: ICD-10-CM

## 2021-05-25 DIAGNOSIS — I25.84 CORONARY ARTERY DISEASE DUE TO CALCIFIED CORONARY LESION: ICD-10-CM

## 2021-05-25 DIAGNOSIS — R29.818 OTHER SYMPTOMS AND SIGNS INVOLVING THE NERVOUS SYSTEM: ICD-10-CM

## 2021-05-25 DIAGNOSIS — N18.31 STAGE 3A CHRONIC KIDNEY DISEASE: ICD-10-CM

## 2021-05-25 DIAGNOSIS — I15.2 HYPERTENSION ASSOCIATED WITH DIABETES: ICD-10-CM

## 2021-05-25 DIAGNOSIS — E03.9 HYPOTHYROIDISM, UNSPECIFIED TYPE: ICD-10-CM

## 2021-05-25 PROCEDURE — 82565 ASSAY OF CREATININE: CPT | Performed by: FAMILY MEDICINE

## 2021-05-25 PROCEDURE — 99214 PR OFFICE/OUTPT VISIT, EST, LEVL IV, 30-39 MIN: ICD-10-PCS | Mod: S$PBB,,, | Performed by: FAMILY MEDICINE

## 2021-05-25 PROCEDURE — 99999 PR PBB SHADOW E&M-EST. PATIENT-LVL V: ICD-10-PCS | Mod: PBBFAC,,, | Performed by: FAMILY MEDICINE

## 2021-05-25 PROCEDURE — 99999 PR PBB SHADOW E&M-EST. PATIENT-LVL V: CPT | Mod: PBBFAC,,, | Performed by: FAMILY MEDICINE

## 2021-05-25 PROCEDURE — 99214 OFFICE O/P EST MOD 30 MIN: CPT | Mod: S$PBB,,, | Performed by: FAMILY MEDICINE

## 2021-05-25 PROCEDURE — 36415 COLL VENOUS BLD VENIPUNCTURE: CPT | Mod: PO | Performed by: FAMILY MEDICINE

## 2021-05-25 PROCEDURE — 99215 OFFICE O/P EST HI 40 MIN: CPT | Mod: PBBFAC,PO | Performed by: FAMILY MEDICINE

## 2021-05-26 ENCOUNTER — PATIENT MESSAGE (OUTPATIENT)
Dept: DIABETES | Facility: CLINIC | Age: 66
End: 2021-05-26

## 2021-05-26 ENCOUNTER — PATIENT MESSAGE (OUTPATIENT)
Dept: SURGERY | Facility: HOSPITAL | Age: 66
End: 2021-05-26

## 2021-05-26 LAB
CREAT SERPL-MCNC: 1.3 MG/DL (ref 0.5–1.4)
EST. GFR  (AFRICAN AMERICAN): >60 ML/MIN/1.73 M^2
EST. GFR  (NON AFRICAN AMERICAN): 56.5 ML/MIN/1.73 M^2

## 2021-05-27 ENCOUNTER — HOSPITAL ENCOUNTER (OUTPATIENT)
Dept: RADIOLOGY | Facility: HOSPITAL | Age: 66
Discharge: HOME | End: 2021-05-27
Attending: FAMILY MEDICINE
Payer: MEDICARE

## 2021-05-27 ENCOUNTER — PATIENT MESSAGE (OUTPATIENT)
Dept: FAMILY MEDICINE | Facility: CLINIC | Age: 66
End: 2021-05-27

## 2021-05-27 DIAGNOSIS — R29.818 OTHER SYMPTOMS AND SIGNS INVOLVING THE NERVOUS SYSTEM: ICD-10-CM

## 2021-05-27 DIAGNOSIS — I63.89 OTHER CEREBRAL INFARCTION: ICD-10-CM

## 2021-05-27 PROCEDURE — 70544 MR ANGIOGRAPHY HEAD W/O DYE: CPT | Mod: 26,,, | Performed by: RADIOLOGY

## 2021-05-27 PROCEDURE — 25500020 PHARM REV CODE 255: Performed by: FAMILY MEDICINE

## 2021-05-27 PROCEDURE — A9585 GADOBUTROL INJECTION: HCPCS | Performed by: FAMILY MEDICINE

## 2021-05-27 PROCEDURE — 70544 MR ANGIOGRAPHY HEAD W/O DYE: CPT | Mod: TC

## 2021-05-27 PROCEDURE — 70549 MR ANGIOGRAPH NECK W/O&W/DYE: CPT | Mod: 26,,, | Performed by: RADIOLOGY

## 2021-05-27 PROCEDURE — 70549 MR ANGIOGRAPH NECK W/O&W/DYE: CPT | Mod: TC

## 2021-05-27 PROCEDURE — 70544 MRA BRAIN WITHOUT CONTRAST: ICD-10-PCS | Mod: 26,,, | Performed by: RADIOLOGY

## 2021-05-27 PROCEDURE — 70549 MRA NECK WITH AND WITHOUT: ICD-10-PCS | Mod: 26,,, | Performed by: RADIOLOGY

## 2021-05-27 RX ORDER — GADOBUTROL 604.72 MG/ML
8 INJECTION INTRAVENOUS
Status: COMPLETED | OUTPATIENT
Start: 2021-05-27 | End: 2021-05-27

## 2021-05-27 RX ADMIN — GADOBUTROL 8 ML: 604.72 INJECTION INTRAVENOUS at 06:05

## 2021-06-03 ENCOUNTER — HOSPITAL ENCOUNTER (OUTPATIENT)
Dept: RADIOLOGY | Facility: HOSPITAL | Age: 66
Discharge: HOME | End: 2021-06-03
Attending: PHYSICIAN ASSISTANT
Payer: MEDICARE

## 2021-06-03 DIAGNOSIS — R29.2 ABNORMAL REFLEX: ICD-10-CM

## 2021-06-03 DIAGNOSIS — R26.89 BALANCE PROBLEM: ICD-10-CM

## 2021-06-03 DIAGNOSIS — M54.50 LOW BACK PAIN: ICD-10-CM

## 2021-06-03 DIAGNOSIS — R26.9 ABNORMALITY OF GAIT: ICD-10-CM

## 2021-06-03 PROCEDURE — 72146 MRI CHEST SPINE W/O DYE: CPT | Mod: 26,,, | Performed by: RADIOLOGY

## 2021-06-03 PROCEDURE — 72146 MRI CHEST SPINE W/O DYE: CPT | Mod: TC,PO

## 2021-06-03 PROCEDURE — 72148 MRI LUMBAR SPINE W/O DYE: CPT | Mod: TC,PO

## 2021-06-03 PROCEDURE — 72148 MRI LUMBAR SPINE W/O DYE: CPT | Mod: 26,,, | Performed by: RADIOLOGY

## 2021-06-03 PROCEDURE — 72146 MRI THORACIC SPINE WITHOUT CONTRAST: ICD-10-PCS | Mod: 26,,, | Performed by: RADIOLOGY

## 2021-06-03 PROCEDURE — 72148 MRI LUMBAR SPINE WITHOUT CONTRAST: ICD-10-PCS | Mod: 26,,, | Performed by: RADIOLOGY

## 2021-06-08 ENCOUNTER — CLINICAL SUPPORT (OUTPATIENT)
Dept: DIABETES | Facility: CLINIC | Age: 66
End: 2021-06-08
Payer: MEDICARE

## 2021-06-08 DIAGNOSIS — Z46.81 INSULIN PUMP TRAINING: ICD-10-CM

## 2021-06-08 DIAGNOSIS — E10.42 TYPE 1 DIABETES MELLITUS WITH DIABETIC POLYNEUROPATHY: ICD-10-CM

## 2021-06-08 PROCEDURE — 99999 PR PBB SHADOW E&M-EST. PATIENT-LVL I: ICD-10-PCS | Mod: PBBFAC,,,

## 2021-06-08 PROCEDURE — 99999 PR PBB SHADOW E&M-EST. PATIENT-LVL I: CPT | Mod: PBBFAC,,,

## 2021-06-08 PROCEDURE — G0108 DIAB MANAGE TRN  PER INDIV: HCPCS | Mod: PBBFAC,PO | Performed by: DIETITIAN, REGISTERED

## 2021-06-08 PROCEDURE — 99211 OFF/OP EST MAY X REQ PHY/QHP: CPT | Mod: PBBFAC,PO | Performed by: DIETITIAN, REGISTERED

## 2021-07-01 ENCOUNTER — TELEPHONE (OUTPATIENT)
Dept: ENDOCRINOLOGY | Facility: CLINIC | Age: 66
End: 2021-07-01

## 2021-07-06 ENCOUNTER — OFFICE VISIT (OUTPATIENT)
Dept: FAMILY MEDICINE | Facility: CLINIC | Age: 66
End: 2021-07-06
Payer: MEDICARE

## 2021-07-06 VITALS
RESPIRATION RATE: 16 BRPM | DIASTOLIC BLOOD PRESSURE: 62 MMHG | HEIGHT: 70 IN | OXYGEN SATURATION: 99 % | WEIGHT: 183 LBS | TEMPERATURE: 98 F | HEART RATE: 73 BPM | BODY MASS INDEX: 26.2 KG/M2 | SYSTOLIC BLOOD PRESSURE: 142 MMHG

## 2021-07-06 DIAGNOSIS — I63.89 OTHER CEREBRAL INFARCTION: Primary | ICD-10-CM

## 2021-07-06 DIAGNOSIS — G11.9 CEREBELLAR ATAXIA: ICD-10-CM

## 2021-07-06 DIAGNOSIS — N18.30 STAGE 3 CHRONIC KIDNEY DISEASE, UNSPECIFIED WHETHER STAGE 3A OR 3B CKD: ICD-10-CM

## 2021-07-06 DIAGNOSIS — I73.9 PERIPHERAL VASCULAR DISEASE, UNSPECIFIED: ICD-10-CM

## 2021-07-06 DIAGNOSIS — G60.3 IDIOPATHIC PROGRESSIVE NEUROPATHY: ICD-10-CM

## 2021-07-06 PROCEDURE — 99215 OFFICE O/P EST HI 40 MIN: CPT | Mod: PBBFAC,PO | Performed by: FAMILY MEDICINE

## 2021-07-06 PROCEDURE — 99999 PR PBB SHADOW E&M-EST. PATIENT-LVL V: ICD-10-PCS | Mod: PBBFAC,,, | Performed by: FAMILY MEDICINE

## 2021-07-06 PROCEDURE — 99213 PR OFFICE/OUTPT VISIT, EST, LEVL III, 20-29 MIN: ICD-10-PCS | Mod: S$PBB,,, | Performed by: FAMILY MEDICINE

## 2021-07-06 PROCEDURE — 99999 PR PBB SHADOW E&M-EST. PATIENT-LVL V: CPT | Mod: PBBFAC,,, | Performed by: FAMILY MEDICINE

## 2021-07-06 PROCEDURE — 99213 OFFICE O/P EST LOW 20 MIN: CPT | Mod: S$PBB,,, | Performed by: FAMILY MEDICINE

## 2021-07-13 ENCOUNTER — TELEPHONE (OUTPATIENT)
Dept: FAMILY MEDICINE | Facility: CLINIC | Age: 66
End: 2021-07-13

## 2021-07-20 ENCOUNTER — LAB VISIT (OUTPATIENT)
Dept: LAB | Facility: HOSPITAL | Age: 66
End: 2021-07-20
Payer: MEDICARE

## 2021-07-20 DIAGNOSIS — E10.42 TYPE 1 DIABETES MELLITUS WITH DIABETIC POLYNEUROPATHY: ICD-10-CM

## 2021-07-20 LAB
ESTIMATED AVG GLUCOSE: 171 MG/DL (ref 68–131)
HBA1C MFR BLD: 7.6 % (ref 4–5.6)

## 2021-07-20 PROCEDURE — 83036 HEMOGLOBIN GLYCOSYLATED A1C: CPT | Performed by: NURSE PRACTITIONER

## 2021-07-20 PROCEDURE — 80053 COMPREHEN METABOLIC PANEL: CPT | Performed by: NURSE PRACTITIONER

## 2021-07-20 PROCEDURE — 36415 COLL VENOUS BLD VENIPUNCTURE: CPT | Mod: PO | Performed by: NURSE PRACTITIONER

## 2021-07-21 ENCOUNTER — PATIENT OUTREACH (OUTPATIENT)
Dept: ADMINISTRATIVE | Facility: OTHER | Age: 66
End: 2021-07-21

## 2021-07-21 LAB
ALBUMIN SERPL BCP-MCNC: 4 G/DL (ref 3.5–5.2)
ALP SERPL-CCNC: 72 U/L (ref 55–135)
ALT SERPL W/O P-5'-P-CCNC: 22 U/L (ref 10–44)
ANION GAP SERPL CALC-SCNC: 8 MMOL/L (ref 8–16)
AST SERPL-CCNC: 25 U/L (ref 10–40)
BILIRUB SERPL-MCNC: 0.6 MG/DL (ref 0.1–1)
BUN SERPL-MCNC: 13 MG/DL (ref 8–23)
CALCIUM SERPL-MCNC: 9.7 MG/DL (ref 8.7–10.5)
CHLORIDE SERPL-SCNC: 104 MMOL/L (ref 95–110)
CO2 SERPL-SCNC: 27 MMOL/L (ref 23–29)
CREAT SERPL-MCNC: 1.1 MG/DL (ref 0.5–1.4)
EST. GFR  (AFRICAN AMERICAN): >60 ML/MIN/1.73 M^2
EST. GFR  (NON AFRICAN AMERICAN): >60 ML/MIN/1.73 M^2
GLUCOSE SERPL-MCNC: 57 MG/DL (ref 70–110)
POTASSIUM SERPL-SCNC: 5.3 MMOL/L (ref 3.5–5.1)
PROT SERPL-MCNC: 7.1 G/DL (ref 6–8.4)
SODIUM SERPL-SCNC: 139 MMOL/L (ref 136–145)

## 2021-07-26 ENCOUNTER — OFFICE VISIT (OUTPATIENT)
Dept: ENDOCRINOLOGY | Facility: CLINIC | Age: 66
End: 2021-07-26
Payer: MEDICARE

## 2021-07-26 VITALS
HEART RATE: 77 BPM | SYSTOLIC BLOOD PRESSURE: 110 MMHG | HEIGHT: 70 IN | DIASTOLIC BLOOD PRESSURE: 60 MMHG | BODY MASS INDEX: 26.43 KG/M2 | WEIGHT: 184.63 LBS

## 2021-07-26 DIAGNOSIS — Z46.81 INSULIN PUMP FITTING OR ADJUSTMENT: ICD-10-CM

## 2021-07-26 DIAGNOSIS — I25.84 CORONARY ARTERY DISEASE DUE TO CALCIFIED CORONARY LESION: ICD-10-CM

## 2021-07-26 DIAGNOSIS — E10.649 HYPOGLYCEMIA DUE TO TYPE 1 DIABETES MELLITUS: ICD-10-CM

## 2021-07-26 DIAGNOSIS — N18.32 TYPE 1 DIABETES MELLITUS WITH STAGE 3B CHRONIC KIDNEY DISEASE: ICD-10-CM

## 2021-07-26 DIAGNOSIS — I15.2 HYPERTENSION ASSOCIATED WITH DIABETES: ICD-10-CM

## 2021-07-26 DIAGNOSIS — E78.5 HYPERLIPIDEMIA DUE TO TYPE 1 DIABETES MELLITUS: ICD-10-CM

## 2021-07-26 DIAGNOSIS — E10.69 HYPERLIPIDEMIA DUE TO TYPE 1 DIABETES MELLITUS: ICD-10-CM

## 2021-07-26 DIAGNOSIS — E11.59 HYPERTENSION ASSOCIATED WITH DIABETES: ICD-10-CM

## 2021-07-26 DIAGNOSIS — E03.8 SUBCLINICAL HYPOTHYROIDISM: ICD-10-CM

## 2021-07-26 DIAGNOSIS — E10.42 TYPE 1 DIABETES MELLITUS WITH DIABETIC POLYNEUROPATHY: Primary | ICD-10-CM

## 2021-07-26 DIAGNOSIS — E10.22 TYPE 1 DIABETES MELLITUS WITH STAGE 3B CHRONIC KIDNEY DISEASE: ICD-10-CM

## 2021-07-26 DIAGNOSIS — E10.319 TYPE 1 DIABETES MELLITUS WITH RETINOPATHY, MACULAR EDEMA PRESENCE UNSPECIFIED, UNSPECIFIED LATERALITY, UNSPECIFIED RETINOPATHY SEVERITY: ICD-10-CM

## 2021-07-26 DIAGNOSIS — I25.10 CORONARY ARTERY DISEASE DUE TO CALCIFIED CORONARY LESION: ICD-10-CM

## 2021-07-26 PROCEDURE — 95251 CONT GLUC MNTR ANALYSIS I&R: CPT | Mod: ,,, | Performed by: NURSE PRACTITIONER

## 2021-07-26 PROCEDURE — 95251 PR GLUCOSE MONITOR, 72 HOUR, PHYS INTERP: ICD-10-PCS | Mod: ,,, | Performed by: NURSE PRACTITIONER

## 2021-07-26 PROCEDURE — 99213 OFFICE O/P EST LOW 20 MIN: CPT | Mod: PBBFAC,PO | Performed by: NURSE PRACTITIONER

## 2021-07-26 PROCEDURE — 99999 PR PBB SHADOW E&M-EST. PATIENT-LVL III: CPT | Mod: PBBFAC,,, | Performed by: NURSE PRACTITIONER

## 2021-07-26 PROCEDURE — 99999 PR PBB SHADOW E&M-EST. PATIENT-LVL III: ICD-10-PCS | Mod: PBBFAC,,, | Performed by: NURSE PRACTITIONER

## 2021-07-26 PROCEDURE — 99215 PR OFFICE/OUTPT VISIT, EST, LEVL V, 40-54 MIN: ICD-10-PCS | Mod: S$PBB,,, | Performed by: NURSE PRACTITIONER

## 2021-07-26 PROCEDURE — 99215 OFFICE O/P EST HI 40 MIN: CPT | Mod: S$PBB,,, | Performed by: NURSE PRACTITIONER

## 2021-07-26 RX ORDER — CHOLECALCIFEROL (VITAMIN D3) 50 MCG
TABLET ORAL
COMMUNITY
End: 2022-05-02 | Stop reason: SDUPTHER

## 2021-07-26 RX ORDER — BRIMONIDINE TARTRATE, TIMOLOL MALEATE 2; 5 MG/ML; MG/ML
1 SOLUTION/ DROPS OPHTHALMIC 2 TIMES DAILY
COMMUNITY
Start: 2021-07-25

## 2021-07-26 RX ORDER — KETOROLAC TROMETHAMINE 5 MG/ML
SOLUTION OPHTHALMIC
COMMUNITY
Start: 2021-07-23 | End: 2022-02-09 | Stop reason: ALTCHOICE

## 2021-08-19 ENCOUNTER — LAB VISIT (OUTPATIENT)
Dept: LAB | Facility: HOSPITAL | Age: 66
End: 2021-08-19
Payer: MEDICARE

## 2021-08-19 DIAGNOSIS — D63.1 ANEMIA IN CHRONIC KIDNEY DISEASE (CKD): ICD-10-CM

## 2021-08-19 DIAGNOSIS — N18.2 CHRONIC KIDNEY DISEASE, STAGE II (MILD): Primary | ICD-10-CM

## 2021-08-19 DIAGNOSIS — N18.9 ANEMIA IN CHRONIC KIDNEY DISEASE (CKD): ICD-10-CM

## 2021-08-19 DIAGNOSIS — E87.1 HYPONATREMIA SYNDROME: ICD-10-CM

## 2021-08-19 LAB
ALBUMIN SERPL BCP-MCNC: 4.1 G/DL (ref 3.5–5.2)
ANION GAP SERPL CALC-SCNC: 7 MMOL/L (ref 8–16)
BASOPHILS # BLD AUTO: 0.03 K/UL (ref 0–0.2)
BASOPHILS NFR BLD: 0.6 % (ref 0–1.9)
BUN SERPL-MCNC: 8 MG/DL (ref 8–23)
CALCIUM SERPL-MCNC: 8.9 MG/DL (ref 8.7–10.5)
CHLORIDE SERPL-SCNC: 96 MMOL/L (ref 95–110)
CO2 SERPL-SCNC: 26 MMOL/L (ref 23–29)
CREAT SERPL-MCNC: 1.1 MG/DL (ref 0.5–1.4)
CREAT UR-MCNC: 26 MG/DL (ref 23–375)
DIFFERENTIAL METHOD: ABNORMAL
EOSINOPHIL # BLD AUTO: 0.2 K/UL (ref 0–0.5)
EOSINOPHIL NFR BLD: 4.6 % (ref 0–8)
ERYTHROCYTE [DISTWIDTH] IN BLOOD BY AUTOMATED COUNT: 12.5 % (ref 11.5–14.5)
EST. GFR  (AFRICAN AMERICAN): >60 ML/MIN/1.73 M^2
EST. GFR  (NON AFRICAN AMERICAN): >60 ML/MIN/1.73 M^2
GLUCOSE SERPL-MCNC: 203 MG/DL (ref 70–110)
HCT VFR BLD AUTO: 33.9 % (ref 40–54)
HGB BLD-MCNC: 11.6 G/DL (ref 14–18)
IMM GRANULOCYTES # BLD AUTO: 0.02 K/UL (ref 0–0.04)
IMM GRANULOCYTES NFR BLD AUTO: 0.4 % (ref 0–0.5)
LYMPHOCYTES # BLD AUTO: 1.7 K/UL (ref 1–4.8)
LYMPHOCYTES NFR BLD: 31.9 % (ref 18–48)
MCH RBC QN AUTO: 32.4 PG (ref 27–31)
MCHC RBC AUTO-ENTMCNC: 34.2 G/DL (ref 32–36)
MCV RBC AUTO: 95 FL (ref 82–98)
MONOCYTES # BLD AUTO: 0.5 K/UL (ref 0.3–1)
MONOCYTES NFR BLD: 10.2 % (ref 4–15)
NEUTROPHILS # BLD AUTO: 2.7 K/UL (ref 1.8–7.7)
NEUTROPHILS NFR BLD: 52.3 % (ref 38–73)
NRBC BLD-RTO: 0 /100 WBC
PHOSPHATE SERPL-MCNC: 3.5 MG/DL (ref 2.7–4.5)
PLATELET # BLD AUTO: 246 K/UL (ref 150–450)
PMV BLD AUTO: 10.1 FL (ref 9.2–12.9)
POTASSIUM SERPL-SCNC: 5.3 MMOL/L (ref 3.5–5.1)
PROT UR-MCNC: <7 MG/DL (ref 0–15)
PROT/CREAT UR: NORMAL MG/G{CREAT} (ref 0–0.2)
RBC # BLD AUTO: 3.58 M/UL (ref 4.6–6.2)
SODIUM SERPL-SCNC: 129 MMOL/L (ref 136–145)
WBC # BLD AUTO: 5.18 K/UL (ref 3.9–12.7)

## 2021-08-19 PROCEDURE — 85025 COMPLETE CBC W/AUTO DIFF WBC: CPT | Performed by: INTERNAL MEDICINE

## 2021-08-19 PROCEDURE — 82570 ASSAY OF URINE CREATININE: CPT | Performed by: INTERNAL MEDICINE

## 2021-08-19 PROCEDURE — 36415 COLL VENOUS BLD VENIPUNCTURE: CPT | Mod: PO | Performed by: INTERNAL MEDICINE

## 2021-08-19 PROCEDURE — 80069 RENAL FUNCTION PANEL: CPT | Performed by: INTERNAL MEDICINE

## 2021-08-20 DIAGNOSIS — E10.42 TYPE 1 DIABETES MELLITUS WITH DIABETIC POLYNEUROPATHY: ICD-10-CM

## 2021-08-20 RX ORDER — INSULIN LISPRO 100 [IU]/ML
INJECTION, SOLUTION INTRAVENOUS; SUBCUTANEOUS
Qty: 50 ML | Refills: 3 | Status: SHIPPED | OUTPATIENT
Start: 2021-08-20 | End: 2021-10-25 | Stop reason: SDUPTHER

## 2021-10-07 ENCOUNTER — PATIENT MESSAGE (OUTPATIENT)
Dept: ADMINISTRATIVE | Facility: HOSPITAL | Age: 66
End: 2021-10-07

## 2021-10-18 ENCOUNTER — LAB VISIT (OUTPATIENT)
Dept: LAB | Facility: HOSPITAL | Age: 66
End: 2021-10-18
Attending: FAMILY MEDICINE
Payer: MEDICARE

## 2021-10-18 DIAGNOSIS — E10.42 TYPE 1 DIABETES MELLITUS WITH DIABETIC POLYNEUROPATHY: ICD-10-CM

## 2021-10-18 DIAGNOSIS — E03.8 SUBCLINICAL HYPOTHYROIDISM: ICD-10-CM

## 2021-10-18 LAB
ALBUMIN SERPL BCP-MCNC: 4 G/DL (ref 3.5–5.2)
ALP SERPL-CCNC: 82 U/L (ref 55–135)
ALT SERPL W/O P-5'-P-CCNC: 25 U/L (ref 10–44)
ANION GAP SERPL CALC-SCNC: 11 MMOL/L (ref 8–16)
AST SERPL-CCNC: 27 U/L (ref 10–40)
BILIRUB SERPL-MCNC: 0.7 MG/DL (ref 0.1–1)
BUN SERPL-MCNC: 11 MG/DL (ref 8–23)
CALCIUM SERPL-MCNC: 9.3 MG/DL (ref 8.7–10.5)
CHLORIDE SERPL-SCNC: 101 MMOL/L (ref 95–110)
CHOLEST SERPL-MCNC: 149 MG/DL (ref 120–199)
CHOLEST/HDLC SERPL: 2.3 {RATIO} (ref 2–5)
CO2 SERPL-SCNC: 25 MMOL/L (ref 23–29)
CREAT SERPL-MCNC: 1.4 MG/DL (ref 0.5–1.4)
EST. GFR  (AFRICAN AMERICAN): 59.3 ML/MIN/1.73 M^2
EST. GFR  (NON AFRICAN AMERICAN): 51.3 ML/MIN/1.73 M^2
ESTIMATED AVG GLUCOSE: 169 MG/DL (ref 68–131)
GLUCOSE SERPL-MCNC: 154 MG/DL (ref 70–110)
HBA1C MFR BLD: 7.5 % (ref 4–5.6)
HDLC SERPL-MCNC: 64 MG/DL (ref 40–75)
HDLC SERPL: 43 % (ref 20–50)
LDLC SERPL CALC-MCNC: 70.6 MG/DL (ref 63–159)
NONHDLC SERPL-MCNC: 85 MG/DL
POTASSIUM SERPL-SCNC: 4.4 MMOL/L (ref 3.5–5.1)
PROT SERPL-MCNC: 7.2 G/DL (ref 6–8.4)
SODIUM SERPL-SCNC: 137 MMOL/L (ref 136–145)
T4 FREE SERPL-MCNC: 0.81 NG/DL (ref 0.71–1.51)
TRIGL SERPL-MCNC: 72 MG/DL (ref 30–150)
TSH SERPL DL<=0.005 MIU/L-ACNC: 4.42 UIU/ML (ref 0.4–4)

## 2021-10-18 PROCEDURE — 36415 COLL VENOUS BLD VENIPUNCTURE: CPT | Mod: PO | Performed by: NURSE PRACTITIONER

## 2021-10-18 PROCEDURE — 80061 LIPID PANEL: CPT | Performed by: NURSE PRACTITIONER

## 2021-10-18 PROCEDURE — 84443 ASSAY THYROID STIM HORMONE: CPT | Performed by: NURSE PRACTITIONER

## 2021-10-18 PROCEDURE — 83036 HEMOGLOBIN GLYCOSYLATED A1C: CPT | Performed by: NURSE PRACTITIONER

## 2021-10-18 PROCEDURE — 80053 COMPREHEN METABOLIC PANEL: CPT | Performed by: NURSE PRACTITIONER

## 2021-10-18 PROCEDURE — 84439 ASSAY OF FREE THYROXINE: CPT | Performed by: NURSE PRACTITIONER

## 2021-10-25 ENCOUNTER — OFFICE VISIT (OUTPATIENT)
Dept: ENDOCRINOLOGY | Facility: CLINIC | Age: 66
End: 2021-10-25
Payer: MEDICARE

## 2021-10-25 ENCOUNTER — TELEPHONE (OUTPATIENT)
Dept: ENDOCRINOLOGY | Facility: CLINIC | Age: 66
End: 2021-10-25

## 2021-10-25 VITALS
HEART RATE: 78 BPM | SYSTOLIC BLOOD PRESSURE: 122 MMHG | WEIGHT: 177 LBS | DIASTOLIC BLOOD PRESSURE: 60 MMHG | BODY MASS INDEX: 25.34 KG/M2 | HEIGHT: 70 IN

## 2021-10-25 DIAGNOSIS — I15.2 HYPERTENSION ASSOCIATED WITH DIABETES: ICD-10-CM

## 2021-10-25 DIAGNOSIS — E11.59 HYPERTENSION ASSOCIATED WITH DIABETES: ICD-10-CM

## 2021-10-25 DIAGNOSIS — E10.22 TYPE 1 DIABETES MELLITUS WITH STAGE 3B CHRONIC KIDNEY DISEASE: ICD-10-CM

## 2021-10-25 DIAGNOSIS — E03.8 SUBCLINICAL HYPOTHYROIDISM: ICD-10-CM

## 2021-10-25 DIAGNOSIS — Z46.81 INSULIN PUMP FITTING OR ADJUSTMENT: ICD-10-CM

## 2021-10-25 DIAGNOSIS — E10.319 TYPE 1 DIABETES MELLITUS WITH RETINOPATHY, MACULAR EDEMA PRESENCE UNSPECIFIED, UNSPECIFIED LATERALITY, UNSPECIFIED RETINOPATHY SEVERITY: ICD-10-CM

## 2021-10-25 DIAGNOSIS — E10.69 HYPERLIPIDEMIA DUE TO TYPE 1 DIABETES MELLITUS: ICD-10-CM

## 2021-10-25 DIAGNOSIS — N18.32 TYPE 1 DIABETES MELLITUS WITH STAGE 3B CHRONIC KIDNEY DISEASE: ICD-10-CM

## 2021-10-25 DIAGNOSIS — E10.42 TYPE 1 DIABETES MELLITUS WITH DIABETIC POLYNEUROPATHY: Primary | ICD-10-CM

## 2021-10-25 DIAGNOSIS — E78.5 HYPERLIPIDEMIA DUE TO TYPE 1 DIABETES MELLITUS: ICD-10-CM

## 2021-10-25 DIAGNOSIS — I25.10 CORONARY ARTERY DISEASE DUE TO CALCIFIED CORONARY LESION: ICD-10-CM

## 2021-10-25 DIAGNOSIS — I25.84 CORONARY ARTERY DISEASE DUE TO CALCIFIED CORONARY LESION: ICD-10-CM

## 2021-10-25 PROCEDURE — 99215 PR OFFICE/OUTPT VISIT, EST, LEVL V, 40-54 MIN: ICD-10-PCS | Mod: S$PBB,,, | Performed by: NURSE PRACTITIONER

## 2021-10-25 PROCEDURE — 99999 PR PBB SHADOW E&M-EST. PATIENT-LVL V: CPT | Mod: PBBFAC,,, | Performed by: NURSE PRACTITIONER

## 2021-10-25 PROCEDURE — 95251 CONT GLUC MNTR ANALYSIS I&R: CPT | Mod: ,,, | Performed by: NURSE PRACTITIONER

## 2021-10-25 PROCEDURE — 95251 PR GLUCOSE MONITOR, 72 HOUR, PHYS INTERP: ICD-10-PCS | Mod: ,,, | Performed by: NURSE PRACTITIONER

## 2021-10-25 PROCEDURE — 99215 OFFICE O/P EST HI 40 MIN: CPT | Mod: PBBFAC,PO | Performed by: NURSE PRACTITIONER

## 2021-10-25 PROCEDURE — 99999 PR PBB SHADOW E&M-EST. PATIENT-LVL V: ICD-10-PCS | Mod: PBBFAC,,, | Performed by: NURSE PRACTITIONER

## 2021-10-25 PROCEDURE — 99215 OFFICE O/P EST HI 40 MIN: CPT | Mod: S$PBB,,, | Performed by: NURSE PRACTITIONER

## 2021-10-25 RX ORDER — INSULIN LISPRO 100 [IU]/ML
INJECTION, SOLUTION INTRAVENOUS; SUBCUTANEOUS
Qty: 15 EACH | Refills: 6 | Status: SHIPPED | OUTPATIENT
Start: 2021-10-25

## 2021-10-25 RX ORDER — PREDNISOLONE ACETATE 10 MG/ML
SUSPENSION/ DROPS OPHTHALMIC
COMMUNITY
Start: 2021-07-23

## 2021-10-25 RX ORDER — INSULIN GLARGINE 100 [IU]/ML
INJECTION, SOLUTION SUBCUTANEOUS
COMMUNITY
End: 2021-10-25 | Stop reason: SDUPTHER

## 2021-10-25 RX ORDER — PEN NEEDLE, DIABETIC 30 GX3/16"
NEEDLE, DISPOSABLE MISCELLANEOUS
Qty: 200 EACH | Refills: 3 | Status: SHIPPED | OUTPATIENT
Start: 2021-10-25

## 2021-10-25 RX ORDER — INSULIN GLARGINE 100 [IU]/ML
16 INJECTION, SOLUTION SUBCUTANEOUS DAILY PRN
Qty: 15 EACH | Refills: 6 | Status: SHIPPED | OUTPATIENT
Start: 2021-10-25

## 2021-10-25 RX ORDER — INSULIN LISPRO 100 [IU]/ML
INJECTION, SOLUTION INTRAVENOUS; SUBCUTANEOUS
Qty: 60 ML | Refills: 3 | Status: SHIPPED | OUTPATIENT
Start: 2021-10-25

## 2022-01-18 ENCOUNTER — LAB VISIT (OUTPATIENT)
Dept: LAB | Facility: HOSPITAL | Age: 67
End: 2022-01-18
Attending: NURSE PRACTITIONER
Payer: MEDICARE

## 2022-01-18 DIAGNOSIS — E10.42 TYPE 1 DIABETES MELLITUS WITH DIABETIC POLYNEUROPATHY: ICD-10-CM

## 2022-01-18 LAB
ALBUMIN SERPL BCP-MCNC: 4 G/DL (ref 3.5–5.2)
ALP SERPL-CCNC: 78 U/L (ref 55–135)
ALT SERPL W/O P-5'-P-CCNC: 25 U/L (ref 10–44)
ANION GAP SERPL CALC-SCNC: 11 MMOL/L (ref 8–16)
AST SERPL-CCNC: 26 U/L (ref 10–40)
BILIRUB SERPL-MCNC: 0.6 MG/DL (ref 0.1–1)
BUN SERPL-MCNC: 12 MG/DL (ref 8–23)
CALCIUM SERPL-MCNC: 9.5 MG/DL (ref 8.7–10.5)
CHLORIDE SERPL-SCNC: 101 MMOL/L (ref 95–110)
CO2 SERPL-SCNC: 22 MMOL/L (ref 23–29)
CREAT SERPL-MCNC: 1.1 MG/DL (ref 0.5–1.4)
EST. GFR  (AFRICAN AMERICAN): >60 ML/MIN/1.73 M^2
EST. GFR  (NON AFRICAN AMERICAN): >60 ML/MIN/1.73 M^2
ESTIMATED AVG GLUCOSE: 186 MG/DL (ref 68–131)
GLUCOSE SERPL-MCNC: 128 MG/DL (ref 70–110)
HBA1C MFR BLD: 8.1 % (ref 4–5.6)
POTASSIUM SERPL-SCNC: 4.9 MMOL/L (ref 3.5–5.1)
PROT SERPL-MCNC: 7 G/DL (ref 6–8.4)
SODIUM SERPL-SCNC: 134 MMOL/L (ref 136–145)

## 2022-01-18 PROCEDURE — 36415 COLL VENOUS BLD VENIPUNCTURE: CPT | Mod: PO | Performed by: NURSE PRACTITIONER

## 2022-01-18 PROCEDURE — 83036 HEMOGLOBIN GLYCOSYLATED A1C: CPT | Performed by: NURSE PRACTITIONER

## 2022-01-18 PROCEDURE — 80053 COMPREHEN METABOLIC PANEL: CPT | Performed by: NURSE PRACTITIONER

## 2022-01-20 ENCOUNTER — PATIENT OUTREACH (OUTPATIENT)
Dept: ADMINISTRATIVE | Facility: OTHER | Age: 67
End: 2022-01-20
Payer: MEDICARE

## 2022-01-20 NOTE — PROGRESS NOTES
Health Maintenance Due   Topic Date Due    Shingles Vaccine (1 of 2) Never done    Influenza Vaccine (1) 09/01/2021    Foot Exam  11/27/2021    Eye Exam  02/04/2022     Updates were requested from care everywhere.  Chart was reviewed for overdue [facility]active [facility] Encounters ([facility]) topics (CRS, Breast Cancer Screening, Eye exam)  Health Maintenance has been updated.  LINKS immunization registry triggered.  Immunizations were reconciled.

## 2022-01-24 ENCOUNTER — OFFICE VISIT (OUTPATIENT)
Dept: ENDOCRINOLOGY | Facility: CLINIC | Age: 67
End: 2022-01-24
Payer: MEDICARE

## 2022-01-24 VITALS
HEIGHT: 70 IN | DIASTOLIC BLOOD PRESSURE: 62 MMHG | SYSTOLIC BLOOD PRESSURE: 118 MMHG | BODY MASS INDEX: 26.2 KG/M2 | HEART RATE: 80 BPM | OXYGEN SATURATION: 99 % | WEIGHT: 183 LBS

## 2022-01-24 DIAGNOSIS — E03.8 SUBCLINICAL HYPOTHYROIDISM: ICD-10-CM

## 2022-01-24 DIAGNOSIS — I15.2 HYPERTENSION ASSOCIATED WITH DIABETES: ICD-10-CM

## 2022-01-24 DIAGNOSIS — I25.84 CORONARY ARTERY DISEASE DUE TO CALCIFIED CORONARY LESION: ICD-10-CM

## 2022-01-24 DIAGNOSIS — I25.10 CORONARY ARTERY DISEASE DUE TO CALCIFIED CORONARY LESION: ICD-10-CM

## 2022-01-24 DIAGNOSIS — Z46.81 INSULIN PUMP FITTING OR ADJUSTMENT: ICD-10-CM

## 2022-01-24 DIAGNOSIS — E10.69 HYPERLIPIDEMIA DUE TO TYPE 1 DIABETES MELLITUS: ICD-10-CM

## 2022-01-24 DIAGNOSIS — E11.59 HYPERTENSION ASSOCIATED WITH DIABETES: ICD-10-CM

## 2022-01-24 DIAGNOSIS — E10.22 TYPE 1 DIABETES MELLITUS WITH STAGE 3B CHRONIC KIDNEY DISEASE: ICD-10-CM

## 2022-01-24 DIAGNOSIS — N18.32 TYPE 1 DIABETES MELLITUS WITH STAGE 3B CHRONIC KIDNEY DISEASE: ICD-10-CM

## 2022-01-24 DIAGNOSIS — E10.319 TYPE 1 DIABETES MELLITUS WITH RETINOPATHY, MACULAR EDEMA PRESENCE UNSPECIFIED, UNSPECIFIED LATERALITY, UNSPECIFIED RETINOPATHY SEVERITY: ICD-10-CM

## 2022-01-24 DIAGNOSIS — E78.5 HYPERLIPIDEMIA DUE TO TYPE 1 DIABETES MELLITUS: ICD-10-CM

## 2022-01-24 DIAGNOSIS — E10.42 TYPE 1 DIABETES MELLITUS WITH DIABETIC POLYNEUROPATHY: Primary | ICD-10-CM

## 2022-01-24 PROCEDURE — 99999 PR PBB SHADOW E&M-EST. PATIENT-LVL V: CPT | Mod: PBBFAC,,, | Performed by: NURSE PRACTITIONER

## 2022-01-24 PROCEDURE — 95251 PR GLUCOSE MONITOR, 72 HOUR, PHYS INTERP: ICD-10-PCS | Mod: ,,, | Performed by: NURSE PRACTITIONER

## 2022-01-24 PROCEDURE — 99215 OFFICE O/P EST HI 40 MIN: CPT | Mod: PBBFAC,PO | Performed by: NURSE PRACTITIONER

## 2022-01-24 PROCEDURE — 95251 CONT GLUC MNTR ANALYSIS I&R: CPT | Mod: ,,, | Performed by: NURSE PRACTITIONER

## 2022-01-24 PROCEDURE — 99214 OFFICE O/P EST MOD 30 MIN: CPT | Mod: S$PBB,,, | Performed by: NURSE PRACTITIONER

## 2022-01-24 PROCEDURE — 99214 PR OFFICE/OUTPT VISIT, EST, LEVL IV, 30-39 MIN: ICD-10-PCS | Mod: S$PBB,,, | Performed by: NURSE PRACTITIONER

## 2022-01-24 PROCEDURE — 99999 PR PBB SHADOW E&M-EST. PATIENT-LVL V: ICD-10-PCS | Mod: PBBFAC,,, | Performed by: NURSE PRACTITIONER

## 2022-01-24 NOTE — PATIENT INSTRUCTIONS
Basal:  0000 - 0.9 u/hr  0600 - 0.4 u/hr  1200 - 0.6 u/hr    I:C ratio:  0000 - 1:6    ISF:    0000 - 30    Target:   0000 - 100-120    Active insulin time: 3 hours

## 2022-01-24 NOTE — PROGRESS NOTES
CC: Mr. Narciso Becerra arrives today for management of Type 1  DM and review of chronic medical conditions, as listed in the visit diagnosis section of this encounter.     HPI: Mr. Narciso Becerra was diagnosed with Type 1  DM at age 18 after viral illness. Denies FH of DM. Denies hospitalizations due to DM.   Wears Dexcom G5.  He started insulin pump therapy with Omnipod in June 2015.  Has CAD, is s/p CABG in 2012 and follows with cardiology (Dr. Cedeño).  Follows with Dr. Estrada every 3-6 months for CKD.     Patient was last seen by me in October. At this time, basal rates and I:C ratio were adjusted.     Today, he states that he has been increasing his correction bolus amount in order to receive larger bolus.     BG monitoring per Dexcom G6.     Hypoglycemia: Rare - no pattern  Hypoglycemic Symptoms: dizziness, jitteriness and sweating   Hypoglycemia Treatment: juice    Exercise: No formal.     Dietary Habits: Eats 2 meals/day. Eats lunch (3PM) and dinner (9PM). Occasional snacking in morning or mid-day (takes bolus if contains carbs). Avoids sugary beverages.     Last DM education appointment: 6/8/2021 - [email or web address] training     Regarding hypothyroidism, he is compliant with taking levothyroxine 25 mcg at least 30 min before eating or other pills.        CURRENT DIABETIC MEDS: Humalog in Omnipod DASH  Glucometer type: Freestyle strips  Insulin back up plan: Lantus 17 units daily, Humalog per carb counting    Name of Device or Devices used by the patient: Omnipod  When did you start the current therapy you are on: 6/2015  Frequency of changing site/infusion set/tubing/cartridges: 3 days  Frequency of changing CGM: 10 days  Using bolus wizard: yes  Taking bolus with each meal: yes    PUMP SETTINGS:  Basal:  0000 - 0.9 u/hr  0600 - 0.4 u/hr  1200 - 0.5 u/hr    I:C ratio:  0000 - 1:6    ISF:    0000 - 35    Target:   0000 - 100-120    Active insulin time: 3 hours      Last Eye Exam: 12/2021 + DR. Srivastava  "Dr. [place]on. Past L retina detachment x 3. H/o cataract sx.   Last Podiatry Exam: n/a    REVIEW OF SYSTEMS  Constitutional: no c/o weakness, fatigue, weight loss. + 6# weight gain  Eyes: c/o chronic visual disturbances to L eye that he attributes to retina surgery. Reports fair vision in R eye.  Cardiac: no palpitations or chest pain.  Respiratory: no cough or dyspnea.  GI: no c/o abdominal pain or nausea.  Skin: no lesions or rashes.  Neuro: + mild numbness, tingling to BLE.   Endocrine: denies polyphagia, polydipsia, polyuria      Personally reviewed Past Medical, Surgical, Social History.    Vital Signs  /62 (BP Method: Large (Manual))   Pulse 80   Ht 5' 10" (1.778 m)   Wt 83 kg (183 lb)   SpO2 99%   BMI 26.26 kg/m²     Personally reviewed the below labs:      Hemoglobin A1C   Date Value Ref Range Status   01/18/2022 8.1 (H) 4.0 - 5.6 % Final     Comment:     ADA Screening Guidelines:  5.7-6.4%  Consistent with prediabetes  >or=6.5%  Consistent with diabetes    High levels of fetal hemoglobin interfere with the HbA1C  assay. Heterozygous hemoglobin variants (HbS, HgC, etc)do  not significantly interfere with this assay.   However, presence of multiple variants may affect accuracy.     10/18/2021 7.5 (H) 4.0 - 5.6 % Final     Comment:     ADA Screening Guidelines:  5.7-6.4%  Consistent with prediabetes  >or=6.5%  Consistent with diabetes    High levels of fetal hemoglobin interfere with the HbA1C  assay. Heterozygous hemoglobin variants (HbS, HgC, etc)do  not significantly interfere with this assay.   However, presence of multiple variants may affect accuracy.     07/20/2021 7.6 (H) 4.0 - 5.6 % Final     Comment:     ADA Screening Guidelines:  5.7-6.4%  Consistent with prediabetes  >or=6.5%  Consistent with diabetes    High levels of fetal hemoglobin interfere with the HbA1C  assay. Heterozygous hemoglobin variants (HbS, HgC, etc)do  not significantly interfere with this assay.   However, presence of " multiple variants may affect accuracy.         Chemistry        Component Value Date/Time     (L) 01/18/2022 1153    K 4.9 01/18/2022 1153     01/18/2022 1153    CO2 22 (L) 01/18/2022 1153    BUN 12 01/18/2022 1153    CREATININE 1.1 01/18/2022 1153     (H) 01/18/2022 1153        Component Value Date/Time    CALCIUM 9.5 01/18/2022 1153    ALKPHOS 78 01/18/2022 1153    AST 26 01/18/2022 1153    ALT 25 01/18/2022 1153    BILITOT 0.6 01/18/2022 1153          Lab Results   Component Value Date    CHOL 149 10/18/2021    CHOL 152 12/02/2020    CHOL 139 11/07/2019     Lab Results   Component Value Date    HDL 64 10/18/2021    HDL 52 12/02/2020    HDL 59 11/07/2019     Lab Results   Component Value Date    LDLCALC 70.6 10/18/2021    LDLCALC 82.2 12/02/2020    LDLCALC 60.0 (L) 11/07/2019     Lab Results   Component Value Date    TRIG 72 10/18/2021    TRIG 89 12/02/2020    TRIG 100 11/07/2019     Lab Results   Component Value Date    CHOLHDL 43.0 10/18/2021    CHOLHDL 34.2 12/02/2020    CHOLHDL 42.4 11/07/2019       Lab Results   Component Value Date    MICALBCREAT Unable to calculate 10/18/2021     Lab Results   Component Value Date    TSH 4.418 (H) 10/18/2021       CrCl cannot be calculated (Unknown ideal weight.).    No results found for: [id number]      PHYSICAL EXAMINATION  Constitutional: Appears well, no distress  Neck: Supple, trachea midline  Respiratory: CTA, even and unlabored.  Cardiovascular: RRR, no murmurs, no carotid bruits.   GI: active bowel sounds, no hernia  Skin: warm and dry.  Neuro: oriented to person, place, time   Feet: appropriate footwear.   Protective Sensation (w/ 10 gram monofilament):  Right: Decreased - 1 site not sensed  Left: Decreased - 1 site not sensed    Visual Inspection:  Normal -  Bilateral    Pedal Pulses:   Right: Present  Left: Present    Posterior tibialis:   Right:Present  Left: Present          PUMP/SENSOR DOWNLOAD: See media file for details. Fasting glucoses  are often within goal. However, sometimes evening prandial excursions extend into the next morning. Sporadic prandial excursions. Infrequent hypoglycemia.   Average TDD: 33.9 u  Basal: 37% (14.2 u)  Bolus: 63%       Average glucose: 163  Above 250 mg/dL: 10 %  181-250 mg/dL: 25 %   mg/dL: 64 %  54-69 mg/dL: <1 %  Below 54 mg/dL: <1 %          Goals      HEMOGLOBIN A1C < 7.5             Assessment/Plan  1. Type 1 diabetes mellitus with diabetic polyneuropathy        -- Recent download shows improvement. Sporadic prandial excursions noted but not with enough consistency to change carb ratio at this time.   -- change basal rates as follows:  0000 - 0.9 u/hr  0600 - 0.4 u/hr  1200 - 0.6 u/hr  -- change ISF to 30  -- continue Dexcom G6     -- Discussed diagnosis of DM, A1c goals, progression of disease, long term complications and tx options.  Advised patient to check BG before activities, such as driving or exercise.  -- Reviewed hypoglycemia management: treat with 1/2 glass of juice, 1/2 can regular coke, or 4 glucose tablets. Monitor and repeat treatment every 15 minutes until BG is >70 Then have a snack, which includes a complex carbohydrate and protein.    -- takes aspirin, statin   2. Type 1 diabetes mellitus with retinopathy, macular edema presence unspecified, unspecified laterality, unspecified retinopathy severity  -- keep follow ups   3. Type 1 diabetes mellitus with stage 3 chronic kidney disease  -- avoid hypoglycemia  -- follows with nephrology   4. Coronary artery disease due to calcified coronary lesion  -- avoid hypoglycemia  -- follows with cardiology    5. Hypertension associated with diabetes  -- controlled  -- defer med mgt to cardiology or nephrology   6. Hyperlipidemia due to type 1 diabetes mellitus  -- controlled  -- continue statin   7. Insulin pump adjustment  -- download reviewed and settings changed   8. Subclinical hypothyroidism  -- marginally elevated  -- repeat with RTC  --  continue current levothyroxine dose          FOLLOW UP  Follow up in about 3 months (around 4/24/2022).   Patient instructed to bring BG logs to each follow up   Patient encouraged to call for any BG/medication issues, concerns, or questions.    Orders Placed This Encounter   Procedures    TSH    Hemoglobin A1C

## 2022-01-28 LAB
LEFT EYE DM RETINOPATHY: POSITIVE
RIGHT EYE DM RETINOPATHY: NEGATIVE

## 2022-02-02 ENCOUNTER — PATIENT MESSAGE (OUTPATIENT)
Dept: FAMILY MEDICINE | Facility: CLINIC | Age: 67
End: 2022-02-02

## 2022-02-08 ENCOUNTER — TELEPHONE (OUTPATIENT)
Dept: FAMILY MEDICINE | Facility: CLINIC | Age: 67
End: 2022-02-08
Payer: MEDICARE

## 2022-02-08 ENCOUNTER — LAB VISIT (OUTPATIENT)
Dept: LAB | Facility: HOSPITAL | Age: 67
End: 2022-02-08
Attending: FAMILY MEDICINE
Payer: MEDICARE

## 2022-02-08 DIAGNOSIS — N18.2 CHRONIC KIDNEY DISEASE, STAGE II (MILD): Primary | ICD-10-CM

## 2022-02-08 LAB
ALBUMIN SERPL BCP-MCNC: 4 G/DL (ref 3.5–5.2)
ANION GAP SERPL CALC-SCNC: 6 MMOL/L (ref 8–16)
BACTERIA #/AREA URNS HPF: NORMAL /HPF
BASOPHILS # BLD AUTO: 0.05 K/UL (ref 0–0.2)
BASOPHILS NFR BLD: 1 % (ref 0–1.9)
BILIRUB UR QL STRIP: NEGATIVE
BUN SERPL-MCNC: 9 MG/DL (ref 8–23)
CALCIUM SERPL-MCNC: 9.4 MG/DL (ref 8.7–10.5)
CHLORIDE SERPL-SCNC: 101 MMOL/L (ref 95–110)
CLARITY UR: CLEAR
CO2 SERPL-SCNC: 26 MMOL/L (ref 23–29)
COLOR UR: YELLOW
CREAT SERPL-MCNC: 1.1 MG/DL (ref 0.5–1.4)
CREAT UR-MCNC: 46 MG/DL (ref 23–375)
DIFFERENTIAL METHOD: ABNORMAL
EOSINOPHIL # BLD AUTO: 0.2 K/UL (ref 0–0.5)
EOSINOPHIL NFR BLD: 3.5 % (ref 0–8)
ERYTHROCYTE [DISTWIDTH] IN BLOOD BY AUTOMATED COUNT: 13.2 % (ref 11.5–14.5)
EST. GFR  (AFRICAN AMERICAN): >60 ML/MIN/1.73 M^2
EST. GFR  (NON AFRICAN AMERICAN): >60 ML/MIN/1.73 M^2
GLUCOSE SERPL-MCNC: 140 MG/DL (ref 70–110)
GLUCOSE UR QL STRIP: NEGATIVE
HCT VFR BLD AUTO: 34.7 % (ref 40–54)
HGB BLD-MCNC: 11.6 G/DL (ref 14–18)
HGB UR QL STRIP: NEGATIVE
IMM GRANULOCYTES # BLD AUTO: 0.01 K/UL (ref 0–0.04)
IMM GRANULOCYTES NFR BLD AUTO: 0.2 % (ref 0–0.5)
KETONES UR QL STRIP: NEGATIVE
LEUKOCYTE ESTERASE UR QL STRIP: ABNORMAL
LYMPHOCYTES # BLD AUTO: 1.6 K/UL (ref 1–4.8)
LYMPHOCYTES NFR BLD: 30.1 % (ref 18–48)
MCH RBC QN AUTO: 33 PG (ref 27–31)
MCHC RBC AUTO-ENTMCNC: 33.4 G/DL (ref 32–36)
MCV RBC AUTO: 99 FL (ref 82–98)
MICROSCOPIC COMMENT: NORMAL
MONOCYTES # BLD AUTO: 0.5 K/UL (ref 0.3–1)
MONOCYTES NFR BLD: 8.9 % (ref 4–15)
NEUTROPHILS # BLD AUTO: 2.9 K/UL (ref 1.8–7.7)
NEUTROPHILS NFR BLD: 56.3 % (ref 38–73)
NITRITE UR QL STRIP: NEGATIVE
NRBC BLD-RTO: 0 /100 WBC
PH UR STRIP: 6 [PH] (ref 5–8)
PHOSPHATE SERPL-MCNC: 2.7 MG/DL (ref 2.7–4.5)
PLATELET # BLD AUTO: 254 K/UL (ref 150–450)
PMV BLD AUTO: 10.3 FL (ref 9.2–12.9)
POTASSIUM SERPL-SCNC: 5.1 MMOL/L (ref 3.5–5.1)
PROT UR QL STRIP: NEGATIVE
PROT UR-MCNC: <7 MG/DL (ref 0–15)
PROT/CREAT UR: NORMAL MG/G{CREAT} (ref 0–0.2)
RBC # BLD AUTO: 3.52 M/UL (ref 4.6–6.2)
SODIUM SERPL-SCNC: 133 MMOL/L (ref 136–145)
SP GR UR STRIP: 1.01 (ref 1–1.03)
URN SPEC COLLECT METH UR: ABNORMAL
WBC # BLD AUTO: 5.18 K/UL (ref 3.9–12.7)
WBC #/AREA URNS HPF: 1 /HPF (ref 0–5)

## 2022-02-08 PROCEDURE — 80069 RENAL FUNCTION PANEL: CPT | Performed by: INTERNAL MEDICINE

## 2022-02-08 PROCEDURE — 81000 URINALYSIS NONAUTO W/SCOPE: CPT | Mod: PO | Performed by: INTERNAL MEDICINE

## 2022-02-08 PROCEDURE — 82570 ASSAY OF URINE CREATININE: CPT | Performed by: INTERNAL MEDICINE

## 2022-02-08 PROCEDURE — 36415 COLL VENOUS BLD VENIPUNCTURE: CPT | Mod: PO | Performed by: INTERNAL MEDICINE

## 2022-02-08 PROCEDURE — 85025 COMPLETE CBC W/AUTO DIFF WBC: CPT | Performed by: INTERNAL MEDICINE

## 2022-02-09 ENCOUNTER — OFFICE VISIT (OUTPATIENT)
Dept: FAMILY MEDICINE | Facility: CLINIC | Age: 67
End: 2022-02-09
Payer: MEDICARE

## 2022-02-09 VITALS
HEART RATE: 82 BPM | DIASTOLIC BLOOD PRESSURE: 66 MMHG | SYSTOLIC BLOOD PRESSURE: 130 MMHG | OXYGEN SATURATION: 99 % | WEIGHT: 182.13 LBS | HEIGHT: 70 IN | BODY MASS INDEX: 26.07 KG/M2

## 2022-02-09 DIAGNOSIS — E78.5 HYPERLIPIDEMIA DUE TO TYPE 1 DIABETES MELLITUS: ICD-10-CM

## 2022-02-09 DIAGNOSIS — N18.30 STAGE 3 CHRONIC KIDNEY DISEASE, UNSPECIFIED WHETHER STAGE 3A OR 3B CKD: ICD-10-CM

## 2022-02-09 DIAGNOSIS — F41.1 GAD (GENERALIZED ANXIETY DISORDER): ICD-10-CM

## 2022-02-09 DIAGNOSIS — I25.10 CORONARY ARTERY DISEASE DUE TO CALCIFIED CORONARY LESION: ICD-10-CM

## 2022-02-09 DIAGNOSIS — I25.84 CORONARY ARTERY DISEASE DUE TO CALCIFIED CORONARY LESION: ICD-10-CM

## 2022-02-09 DIAGNOSIS — I15.2 HYPERTENSION ASSOCIATED WITH DIABETES: Primary | ICD-10-CM

## 2022-02-09 DIAGNOSIS — E03.9 HYPOTHYROIDISM, UNSPECIFIED TYPE: ICD-10-CM

## 2022-02-09 DIAGNOSIS — N52.01 ERECTILE DYSFUNCTION DUE TO ARTERIAL INSUFFICIENCY: ICD-10-CM

## 2022-02-09 DIAGNOSIS — I63.89 OTHER CEREBRAL INFARCTION: ICD-10-CM

## 2022-02-09 DIAGNOSIS — G11.9 CEREBELLAR ATAXIA: ICD-10-CM

## 2022-02-09 DIAGNOSIS — E11.59 HYPERTENSION ASSOCIATED WITH DIABETES: Primary | ICD-10-CM

## 2022-02-09 DIAGNOSIS — I73.9 PERIPHERAL VASCULAR DISEASE, UNSPECIFIED: ICD-10-CM

## 2022-02-09 DIAGNOSIS — E10.69 HYPERLIPIDEMIA DUE TO TYPE 1 DIABETES MELLITUS: ICD-10-CM

## 2022-02-09 PROCEDURE — 99214 OFFICE O/P EST MOD 30 MIN: CPT | Mod: S$PBB,,, | Performed by: NURSE PRACTITIONER

## 2022-02-09 PROCEDURE — 99215 OFFICE O/P EST HI 40 MIN: CPT | Mod: PBBFAC,PO | Performed by: NURSE PRACTITIONER

## 2022-02-09 PROCEDURE — 99214 PR OFFICE/OUTPT VISIT, EST, LEVL IV, 30-39 MIN: ICD-10-PCS | Mod: S$PBB,,, | Performed by: NURSE PRACTITIONER

## 2022-02-09 PROCEDURE — 99999 PR PBB SHADOW E&M-EST. PATIENT-LVL V: CPT | Mod: PBBFAC,,, | Performed by: NURSE PRACTITIONER

## 2022-02-09 PROCEDURE — 99999 PR PBB SHADOW E&M-EST. PATIENT-LVL V: ICD-10-PCS | Mod: PBBFAC,,, | Performed by: NURSE PRACTITIONER

## 2022-02-09 RX ORDER — PENICILLIN V POTASSIUM 500 MG/1
TABLET, FILM COATED ORAL
COMMUNITY
Start: 2021-12-08 | End: 2022-02-09 | Stop reason: ALTCHOICE

## 2022-02-09 RX ORDER — MULTIVITAMIN
TABLET ORAL
COMMUNITY

## 2022-02-09 RX ORDER — FOLIC ACID 1 MG/1
TABLET ORAL
COMMUNITY
End: 2022-02-09 | Stop reason: SDUPTHER

## 2022-02-09 RX ORDER — LEVOTHYROXINE SODIUM 25 UG/1
TABLET ORAL
COMMUNITY
End: 2022-07-05

## 2022-02-09 RX ORDER — ASPIRIN 81 MG/1
TABLET ORAL
COMMUNITY
End: 2022-02-09 | Stop reason: SDUPTHER

## 2022-02-09 RX ORDER — ATORVASTATIN CALCIUM 40 MG/1
TABLET, FILM COATED ORAL
COMMUNITY
End: 2022-02-09 | Stop reason: SDUPTHER

## 2022-02-09 RX ORDER — CLOPIDOGREL BISULFATE 75 MG/1
TABLET ORAL
COMMUNITY
End: 2022-08-05

## 2022-02-09 RX ORDER — CHOLECALCIFEROL (VITAMIN D3) 50 MCG
TABLET ORAL
COMMUNITY
End: 2022-05-02 | Stop reason: SDUPTHER

## 2022-02-09 RX ORDER — AMOXICILLIN 875 MG/1
TABLET, FILM COATED ORAL
COMMUNITY
Start: 2021-12-14 | End: 2022-02-09 | Stop reason: ALTCHOICE

## 2022-02-09 RX ORDER — IBUPROFEN 100 MG/5ML
SUSPENSION, ORAL (FINAL DOSE FORM) ORAL
COMMUNITY

## 2022-02-09 RX ORDER — ESCITALOPRAM OXALATE 10 MG/1
TABLET ORAL
COMMUNITY
End: 2022-02-09 | Stop reason: SDUPTHER

## 2022-02-09 NOTE — PROGRESS NOTES
Subjective:       Patient ID: Narciso Becerra is a 66 y.o. male.    Chief Complaint: Follow-up    66-year-old male presents to the clinic today for follow-up.  His last hemoglobin A1c was 8.1.  He is a type 1 diabetic followed by endocrinology. Hid insulin was adjusted at his last visit and he is due for follow-up soon.  I did discuss his labs with him today.  He denies any cardiac chest pain, heart palpitations, shortness breath, or swelling to lower extremities. He denies any headaches or dizziness. 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 He is [facility]. His blood sugar presently is 108. He is still thinking about whether he is going to have a penile implant.  He did have a CABG on 07/07/2021.  Also had spinal surgery on 11/20/2021.      Past Medical History:   Diagnosis Date    Anticoagulant long-term use     Anxiety     CKD (chronic kidney disease) stage 3, GFR 30-59 ml/min 4/8/2014    COPD (chronic obstructive pulmonary disease)     GERD (gastroesophageal reflux disease)     Peripheral neuropathy 6/23/2015    Retinopathy of both eyes     Subclinical hypothyroidism 8/16/2018     Past Surgical History:   Procedure Laterality Date    blood transfution      BREAST MASS EXCISION      CARDIAC CATHETERIZATION      CARDIAC SURGERY      march 2012, 5 vessels    CERVICAL FUSION  2018    CORONARY ANGIOGRAPHY N/A 7/7/2020    Procedure: ANGIOGRAM, CORONARY ARTERY;  Surgeon: Chai Grider MD;  Location: [facility] CATH;  Service: Cardiology;  Laterality: N/A;    CORONARY ANGIOGRAPHY INCLUDING BYPASS GRAFTS WITH CATHETERIZATION OF LEFT HEART N/A 7/7/2020    Procedure: ANGIOGRAM, CORONARY, INCLUDING BYPASS GRAFT, WITH LEFT HEART CATHETERIZATION;  Surgeon: Chai Grider MD;  Location: [facility] CATH;  Service: Cardiology;  Laterality: N/A;    CORONARY ANGIOPLASTY      CORONARY ARTERY BYPASS GRAFT      EYE SURGERY      laser    RETINAL DETACHMENT SURGERY Left  06/01/2020    SPINE SURGERY  11/02/2020    lumbar lamaectomy Dr Leida Alexander / [facility]      reports that he quit smoking about 27 years ago. His smoking use included cigars. He started smoking about 44 years ago. He has never used smokeless tobacco. He reports current alcohol use. He reports that he does not use drugs.  Review of Systems   Eyes: Positive for visual disturbance.   Respiratory: Negative for cough, shortness of breath and wheezing.    Cardiovascular: Negative for chest pain, palpitations and leg swelling.   Gastrointestinal: Negative for abdominal pain, constipation, diarrhea and nausea.   Neurological: Negative for dizziness and light-headedness.       Objective:      Physical Exam  Vitals reviewed.   Constitutional:       General: He is not in acute distress.     Appearance: Normal appearance. He is not ill-appearing, toxic-appearing or diaphoretic.   Cardiovascular:      Rate and Rhythm: Normal rate and regular rhythm.      Heart sounds: Normal heart sounds. No murmur heard.      Pulmonary:      Effort: Pulmonary effort is normal.      Breath sounds: Normal breath sounds. No wheezing or rhonchi.   Abdominal:      General: Bowel sounds are normal.      Palpations: Abdomen is soft.      Tenderness: There is no abdominal tenderness.   Musculoskeletal:         General: No swelling. Normal range of motion.   Skin:     General: Skin is warm and dry.   Neurological:      Mental Status: He is alert and oriented to person, place, and time.   Psychiatric:         Mood and Affect: Mood normal.         Behavior: Behavior normal.         Thought Content: Thought content normal.         Judgment: Judgment normal.         Assessment:       1. Hypertension associated with diabetes    2. Hyperlipidemia due to type 1 diabetes mellitus    3. Other cerebral infarction    4. Peripheral vascular disease, unspecified    5. Stage 3 chronic kidney disease, unspecified whether stage 3a or 3b  CKD    6. Cerebellar ataxia    7. Hypothyroidism, unspecified type    8. Coronary artery disease, h/o CABG + stents    9. Erectile dysfunction due to arterial insufficiency    10. ALEX (generalized anxiety disorder)        Plan:         Hypertension associated with diabetes  - controlled   - diabetes followed by Endocrine    Hyperlipidemia due to type 1 diabetes mellitus  - The current medical regimen is effective;  continue present plan and medications.    Other cerebral infarction  - on Plavix     Peripheral vascular disease, unspecified  - on Plavix stable    Stage 3 chronic kidney disease, unspecified whether stage 3a or 3b CKD  - avoid all anti-inflammatories and stay well hydrated  - followed by nephrology    Cerebellar ataxia  - Stable     Hypothyroidism, unspecified type  -  The current medical regimen is effective;  continue present plan and medications.    Coronary artery disease, h/o CABG + stents  - followed by Cardiology    Erectile dysfunction due to arterial insufficiency  - patient is still thinking about whether he is going to have penile implant    ALEX (generalized anxiety disorder)  - The current medical regimen is effective;  continue present plan and medications.

## 2022-02-09 NOTE — PATIENT INSTRUCTIONS
If you are due for any health screening(s) below please notify me so we can arrange them to be ordered and scheduled to maintain your health.     Health Maintenance   Topic Date Due    Eye Exam  02/04/2022    TETANUS VACCINE  05/09/2022    Hemoglobin A1c  07/18/2022    Lipid Panel  10/18/2022    Foot Exam  01/24/2023    High Dose Statin  02/09/2023    Hepatitis C Screening  Completed    Abdominal Aortic Aneurysm Screening  Completed                  Diabetic Retinal Eye Exam    Diabetes is the #1 cause of blindness in the [place] - early detection before signs or symptoms develop can prevent debilitating blindness.    Once-a-year screening is recommended for all diabetic patients. This exam can prevent and treat diabetes complications in the eye before developing symptoms. This can be done with a special camera is used to take photographs of the back of your eye without having to dilate them, or you can see an eye doctor for a full dilated exam.    Although you are still overdue for this important screening, due to the COVID-19 pandemic, we recommend scheduling it in the near future.       Refuses flu shot today   Will try to get [facility]   Follow up with all specialists as scheduled    Follow up with Dr. Coronado in 6 as scheduled    Discuss eye vitamins with eye doctor

## 2022-03-09 ENCOUNTER — OFFICE VISIT (OUTPATIENT)
Dept: FAMILY MEDICINE | Facility: CLINIC | Age: 67
End: 2022-03-09
Payer: MEDICARE

## 2022-03-09 VITALS
OXYGEN SATURATION: 99 % | BODY MASS INDEX: 26.8 KG/M2 | HEIGHT: 70 IN | DIASTOLIC BLOOD PRESSURE: 64 MMHG | WEIGHT: 187.19 LBS | SYSTOLIC BLOOD PRESSURE: 122 MMHG | HEART RATE: 84 BPM

## 2022-03-09 DIAGNOSIS — B02.9 HERPES ZOSTER WITHOUT COMPLICATION: Primary | ICD-10-CM

## 2022-03-09 PROCEDURE — 99213 OFFICE O/P EST LOW 20 MIN: CPT | Mod: PBBFAC,PO | Performed by: NURSE PRACTITIONER

## 2022-03-09 PROCEDURE — 99999 PR PBB SHADOW E&M-EST. PATIENT-LVL III: ICD-10-PCS | Mod: PBBFAC,,, | Performed by: NURSE PRACTITIONER

## 2022-03-09 PROCEDURE — 99214 OFFICE O/P EST MOD 30 MIN: CPT | Mod: S$PBB,,, | Performed by: NURSE PRACTITIONER

## 2022-03-09 PROCEDURE — 99214 PR OFFICE/OUTPT VISIT, EST, LEVL IV, 30-39 MIN: ICD-10-PCS | Mod: S$PBB,,, | Performed by: NURSE PRACTITIONER

## 2022-03-09 PROCEDURE — 99999 PR PBB SHADOW E&M-EST. PATIENT-LVL III: CPT | Mod: PBBFAC,,, | Performed by: NURSE PRACTITIONER

## 2022-03-09 RX ORDER — VALACYCLOVIR HYDROCHLORIDE 1 G/1
1000 TABLET, FILM COATED ORAL 3 TIMES DAILY
Qty: 21 TABLET | Refills: 0 | Status: SHIPPED | OUTPATIENT
Start: 2022-03-09 | End: 2022-07-20

## 2022-03-09 RX ORDER — GABAPENTIN 100 MG/1
100 CAPSULE ORAL 3 TIMES DAILY
Qty: 90 CAPSULE | Refills: 0 | Status: SHIPPED | OUTPATIENT
Start: 2022-03-09 | End: 2022-07-01

## 2022-03-09 NOTE — PATIENT INSTRUCTIONS
If you are due for any health screening(s) below please notify me so we can arrange them to be ordered and scheduled to maintain your health.     Health Maintenance   Topic Date Due    Hemoglobin A1c  04/18/2022    TETANUS VACCINE  05/09/2022    Lipid Panel  10/18/2022    Eye Exam  12/16/2022    Foot Exam  01/24/2023    High Dose Statin  03/09/2023    Hepatitis C Screening  Completed    Abdominal Aortic Aneurysm Screening  Completed     Start Valtrex as prescribed   Gabapentin 100 mg one three times a day

## 2022-03-09 NOTE — PROGRESS NOTES
Subjective:       Patient ID: Narciso Becerra is a 66 y.o. male.    Chief Complaint: Herpes Zoster (face)    66-year-old male presents to the clinic today with complaint of burning to the right side of his face and right side of his scalp since February 28, 2022. He has seen Ophthalmology and Dermatology.  His prescribed Kenalog cream without any relief to the lesions on his face.  His ophthalmologist said he had no lesions in his eye.  He describes the pain on the right side of his face and right scalp as a burning sensation.  Last Saturday he fell and had a shelf and has 7 stitches above his left eyebrow which is also causes bruising to his left orbit area. He denied any loss of consciousness.  He has read up on shingles and feels like he is having a shingle outbreak.    Past Medical History:   Diagnosis Date    Anticoagulant long-term use     Anxiety     CKD (chronic kidney disease) stage 3, GFR 30-59 ml/min 4/8/2014    COPD (chronic obstructive pulmonary disease)     GERD (gastroesophageal reflux disease)     Peripheral neuropathy 6/23/2015    Retinopathy of both eyes     Subclinical hypothyroidism 8/16/2018     Past Surgical History:   Procedure Laterality Date    blood transfution      BREAST MASS EXCISION      CARDIAC CATHETERIZATION      CARDIAC SURGERY      march 2012, 5 vessels    CERVICAL FUSION  2018    CORONARY ANGIOGRAPHY N/A 7/7/2020    Procedure: ANGIOGRAM, CORONARY ARTERY;  Surgeon: Chai Grider MD;  Location: [facility] CATH;  Service: Cardiology;  Laterality: N/A;    CORONARY ANGIOGRAPHY INCLUDING BYPASS GRAFTS WITH CATHETERIZATION OF LEFT HEART N/A 7/7/2020    Procedure: ANGIOGRAM, CORONARY, INCLUDING BYPASS GRAFT, WITH LEFT HEART CATHETERIZATION;  Surgeon: Chai Grider MD;  Location: [facility] CATH;  Service: Cardiology;  Laterality: N/A;    CORONARY ANGIOPLASTY      CORONARY ARTERY BYPASS GRAFT      EYE SURGERY      laser    RETINAL DETACHMENT SURGERY Left 06/01/2020    SPINE  SURGERY  11/02/2020    lumbar lamaectomy Dr Leida Alexander / [facility]      reports that he quit smoking about 27 years ago. His smoking use included cigars. He started smoking about 44 years ago. He has never used smokeless tobacco. He reports current alcohol use. He reports that he does not use drugs.  Review of Systems   Skin: Positive for rash and wound.       Objective:      Physical Exam  Constitutional:       General: He is not in acute distress.     Appearance: Normal appearance. He is normal weight. He is not ill-appearing, toxic-appearing or diaphoretic.   HENT:      Ears:      Comments: No redness or drainage noted either.  There is bruising to the left orbit.  There is slight upper lid swelling of the right eye.  Eyes:      General: No scleral icterus.        Right eye: No discharge.         Left eye: No discharge.      Extraocular Movements: Extraocular movements intact.      Conjunctiva/sclera: Conjunctivae normal.      Pupils: Pupils are equal, round, and reactive to light.      Comments: There is bruising noted to the left orbit.  This bruising is most likely secondary to laceration repair that has above his left eyebrow.  He has no redness or drainage noted to either eye.  He has decreased vision in his left eye due to detached retina in the past.  He has no difficulty with vision of the right eye.  He has several abrasions and small breaks in the skin noted around his right eye and right cheek area consistent with possibly shingles.   Skin:     Findings: Rash present.      Comments: He has 7 stitches above his left eyebrow that are intact.  There is no redness or drainage noted.  There is no signs of infection noted to the incision area.  Please see photos.   Neurological:      Mental Status: He is alert and oriented to person, place, and time.   Psychiatric:         Mood and Affect: Mood normal.         Behavior: Behavior normal.         Thought Content: Thought content  normal.         Judgment: Judgment normal.           Assessment:       1. Herpes zoster without complication        Plan:         Herpes zoster without complication  -     valACYclovir (VALTREX) 1000 MG tablet; Take 1 tablet (1,000 mg total) by mouth 3 (three) times daily. for 7 days  Dispense: 21 tablet; Refill: 0  -     gabapentin (NEURONTIN) 100 MG capsule; Take 1 capsule (100 mg total) by mouth 3 (three) times daily.  Dispense: 90 capsule; Refill: 0    -He was also examined  by Dr. Atkinson the agrees that he could possibly have shingles and recommends Valtrex and gabapentin.    No follow-ups on file.

## 2022-03-14 ENCOUNTER — PATIENT OUTREACH (OUTPATIENT)
Dept: ADMINISTRATIVE | Facility: HOSPITAL | Age: 67
End: 2022-03-14
Payer: MEDICARE

## 2022-03-14 ENCOUNTER — OFFICE VISIT (OUTPATIENT)
Dept: FAMILY MEDICINE | Facility: CLINIC | Age: 67
End: 2022-03-14
Payer: MEDICARE

## 2022-03-14 ENCOUNTER — LAB VISIT (OUTPATIENT)
Dept: LAB | Facility: HOSPITAL | Age: 67
End: 2022-03-14
Attending: PHYSICIAN ASSISTANT
Payer: MEDICARE

## 2022-03-14 VITALS
HEART RATE: 80 BPM | HEIGHT: 70 IN | OXYGEN SATURATION: 99 % | DIASTOLIC BLOOD PRESSURE: 62 MMHG | WEIGHT: 184.5 LBS | SYSTOLIC BLOOD PRESSURE: 128 MMHG | BODY MASS INDEX: 26.41 KG/M2

## 2022-03-14 DIAGNOSIS — R53.83 FATIGUE, UNSPECIFIED TYPE: ICD-10-CM

## 2022-03-14 DIAGNOSIS — Z48.02 ENCOUNTER FOR REMOVAL OF SUTURES: Primary | ICD-10-CM

## 2022-03-14 DIAGNOSIS — B02.9 HERPES ZOSTER WITHOUT COMPLICATION: ICD-10-CM

## 2022-03-14 LAB
BASOPHILS # BLD AUTO: 0.05 K/UL (ref 0–0.2)
BASOPHILS NFR BLD: 0.7 % (ref 0–1.9)
DIFFERENTIAL METHOD: ABNORMAL
EOSINOPHIL # BLD AUTO: 0.3 K/UL (ref 0–0.5)
EOSINOPHIL NFR BLD: 4.1 % (ref 0–8)
ERYTHROCYTE [DISTWIDTH] IN BLOOD BY AUTOMATED COUNT: 12.4 % (ref 11.5–14.5)
HCT VFR BLD AUTO: 34.5 % (ref 40–54)
HGB BLD-MCNC: 11.8 G/DL (ref 14–18)
IMM GRANULOCYTES # BLD AUTO: 0.03 K/UL (ref 0–0.04)
IMM GRANULOCYTES NFR BLD AUTO: 0.4 % (ref 0–0.5)
LYMPHOCYTES # BLD AUTO: 2.7 K/UL (ref 1–4.8)
LYMPHOCYTES NFR BLD: 36.8 % (ref 18–48)
MCH RBC QN AUTO: 33 PG (ref 27–31)
MCHC RBC AUTO-ENTMCNC: 34.2 G/DL (ref 32–36)
MCV RBC AUTO: 96 FL (ref 82–98)
MONOCYTES # BLD AUTO: 0.8 K/UL (ref 0.3–1)
MONOCYTES NFR BLD: 10.4 % (ref 4–15)
NEUTROPHILS # BLD AUTO: 3.5 K/UL (ref 1.8–7.7)
NEUTROPHILS NFR BLD: 47.6 % (ref 38–73)
NRBC BLD-RTO: 0 /100 WBC
PLATELET # BLD AUTO: 274 K/UL (ref 150–450)
PMV BLD AUTO: 10 FL (ref 9.2–12.9)
RBC # BLD AUTO: 3.58 M/UL (ref 4.6–6.2)
WBC # BLD AUTO: 7.34 K/UL (ref 3.9–12.7)

## 2022-03-14 PROCEDURE — 99215 OFFICE O/P EST HI 40 MIN: CPT | Mod: PBBFAC,PO | Performed by: PHYSICIAN ASSISTANT

## 2022-03-14 PROCEDURE — 36415 COLL VENOUS BLD VENIPUNCTURE: CPT | Mod: PO | Performed by: PHYSICIAN ASSISTANT

## 2022-03-14 PROCEDURE — 85025 COMPLETE CBC W/AUTO DIFF WBC: CPT | Performed by: PHYSICIAN ASSISTANT

## 2022-03-14 PROCEDURE — 99214 PR OFFICE/OUTPT VISIT, EST, LEVL IV, 30-39 MIN: ICD-10-PCS | Mod: S$PBB,,, | Performed by: PHYSICIAN ASSISTANT

## 2022-03-14 PROCEDURE — 99214 OFFICE O/P EST MOD 30 MIN: CPT | Mod: S$PBB,,, | Performed by: PHYSICIAN ASSISTANT

## 2022-03-14 PROCEDURE — 99999 PR PBB SHADOW E&M-EST. PATIENT-LVL V: CPT | Mod: PBBFAC,,, | Performed by: PHYSICIAN ASSISTANT

## 2022-03-14 PROCEDURE — 99999 PR PBB SHADOW E&M-EST. PATIENT-LVL V: ICD-10-PCS | Mod: PBBFAC,,, | Performed by: PHYSICIAN ASSISTANT

## 2022-03-14 NOTE — PROGRESS NOTES
"Subjective:      Patient ID: Narciso Becerra is a 66 y.o. male.    Chief Complaint: Suture / Staple Removal (Above left eye brow; 9th day)  Patient is new to me.    HPI   Patient has PMH of peripheral neuropathy, Type 1 DM, HTN, HLD, CAD with CABG and stents, CKD 3, ED, BPH, subclinical hypothyroidism.    Patient went to [facility] ED after hitting face on door at night. Had facial laceration on 3/5/2022 and 7 sutures placed above left eyebrow.    Patient has had shingles outbreak on right eye and scalp for two weeks.  Healing well.  Patient reports extreme fatigue lately.  Patient denies fever, chest pain, or shortness of breath.    Review of Systems   Constitutional: Positive for fatigue. Negative for appetite change, chills and fever.   Respiratory: Negative for shortness of breath.    Cardiovascular: Negative for chest pain.   Skin: Positive for wound (left eye laceration).       Objective:   /62   Pulse 80   Ht 5' 10" (1.778 m)   Wt 83.7 kg (184 lb 8.4 oz)   SpO2 99%   BMI 26.48 kg/m²      Physical Exam  Vitals reviewed.   Constitutional:       General: He is not in acute distress.     Appearance: Normal appearance. He is well-developed and well-groomed.   HENT:      Head: Normocephalic. Laceration present.        Right Ear: Hearing and external ear normal.      Left Ear: Hearing and external ear normal.   Eyes:      General: Lids are normal.      Conjunctiva/sclera: Conjunctivae normal.   Neck:      Trachea: Phonation normal.   Cardiovascular:      Rate and Rhythm: Normal rate and regular rhythm.      Heart sounds: Normal heart sounds. No murmur heard.    No friction rub. No gallop.   Pulmonary:      Effort: Pulmonary effort is normal. No respiratory distress.      Breath sounds: Normal breath sounds. No decreased breath sounds, wheezing, rhonchi or rales.   Musculoskeletal:         General: Normal range of motion.      Cervical back: Normal range of motion.   Skin:     General: Skin is warm and dry. "      Findings: Rash (Right eye and scalp still has some healing erythematous vesicular lesions.) present.   Neurological:      General: No focal deficit present.      Mental Status: He is alert and oriented to person, place, and time.   Psychiatric:         Attention and Perception: Attention normal.         Mood and Affect: Mood normal.         Speech: Speech normal.         Behavior: Behavior normal. Behavior is cooperative.         Judgment: Judgment normal.        Assessment:      1. Encounter for removal of sutures    2. Fatigue, unspecified type    3. Herpes zoster without complication       Plan:   1. Encounter for removal of sutures  6 sutures were removed from above left eyebrow.  Patient tolerated well.  No purulence or TTP noted.    2. Fatigue, unspecified type  - CBC Auto Differential; Future    3. Herpes zoster without complication  Improving.    Follow up as needed.  Patient agreed with plan and expressed understanding.    Thank you for allowing me to serve you,

## 2022-04-11 ENCOUNTER — HOSPITAL ENCOUNTER (OUTPATIENT)
Dept: RADIOLOGY | Facility: HOSPITAL | Age: 67
Discharge: HOME | End: 2022-04-11
Attending: FAMILY MEDICINE
Payer: MEDICARE

## 2022-04-11 ENCOUNTER — TELEPHONE (OUTPATIENT)
Dept: FAMILY MEDICINE | Facility: CLINIC | Age: 67
End: 2022-04-11
Payer: MEDICARE

## 2022-04-11 ENCOUNTER — TELEPHONE (OUTPATIENT)
Dept: ENDOCRINOLOGY | Facility: CLINIC | Age: 67
End: 2022-04-11
Payer: MEDICARE

## 2022-04-11 ENCOUNTER — OFFICE VISIT (OUTPATIENT)
Dept: FAMILY MEDICINE | Facility: CLINIC | Age: 67
End: 2022-04-11
Payer: MEDICARE

## 2022-04-11 ENCOUNTER — PATIENT MESSAGE (OUTPATIENT)
Dept: ENDOCRINOLOGY | Facility: CLINIC | Age: 67
End: 2022-04-11
Payer: MEDICARE

## 2022-04-11 ENCOUNTER — PATIENT MESSAGE (OUTPATIENT)
Dept: FAMILY MEDICINE | Facility: CLINIC | Age: 67
End: 2022-04-11
Payer: MEDICARE

## 2022-04-11 ENCOUNTER — LAB VISIT (OUTPATIENT)
Dept: LAB | Facility: HOSPITAL | Age: 67
End: 2022-04-11
Attending: FAMILY MEDICINE
Payer: MEDICARE

## 2022-04-11 VITALS
WEIGHT: 182.56 LBS | BODY MASS INDEX: 26.14 KG/M2 | SYSTOLIC BLOOD PRESSURE: 130 MMHG | TEMPERATURE: 98 F | OXYGEN SATURATION: 97 % | HEIGHT: 70 IN | RESPIRATION RATE: 16 BRPM | DIASTOLIC BLOOD PRESSURE: 62 MMHG | HEART RATE: 81 BPM

## 2022-04-11 DIAGNOSIS — S86.112A GASTROCNEMIUS MUSCLE TEAR, LEFT, INITIAL ENCOUNTER: ICD-10-CM

## 2022-04-11 DIAGNOSIS — Z91.041 PERSONAL HISTORY OF ALLERGY TO CONTRAST MEDIA (USED FOR DIAGNOSTIC X-RAYS): ICD-10-CM

## 2022-04-11 DIAGNOSIS — R22.42 LOCALIZED SWELLING, MASS, OR LUMP OF LEFT LOWER EXTREMITY: ICD-10-CM

## 2022-04-11 DIAGNOSIS — N62 SUBAREOLAR GYNECOMASTIA IN MALE: ICD-10-CM

## 2022-04-11 DIAGNOSIS — N18.30 STAGE 3 CHRONIC KIDNEY DISEASE, UNSPECIFIED WHETHER STAGE 3A OR 3B CKD: ICD-10-CM

## 2022-04-11 DIAGNOSIS — N62 SUBAREOLAR GYNECOMASTIA IN MALE: Primary | ICD-10-CM

## 2022-04-11 PROCEDURE — 82668 ASSAY OF ERYTHROPOIETIN: CPT | Performed by: FAMILY MEDICINE

## 2022-04-11 PROCEDURE — 85045 AUTOMATED RETICULOCYTE COUNT: CPT | Performed by: FAMILY MEDICINE

## 2022-04-11 PROCEDURE — 82040 ASSAY OF SERUM ALBUMIN: CPT | Performed by: FAMILY MEDICINE

## 2022-04-11 PROCEDURE — 82670 ASSAY OF TOTAL ESTRADIOL: CPT | Performed by: FAMILY MEDICINE

## 2022-04-11 PROCEDURE — 93971 EXTREMITY STUDY: CPT | Mod: 26,LT,, | Performed by: RADIOLOGY

## 2022-04-11 PROCEDURE — 84146 ASSAY OF PROLACTIN: CPT | Performed by: FAMILY MEDICINE

## 2022-04-11 PROCEDURE — 83001 ASSAY OF GONADOTROPIN (FSH): CPT | Performed by: FAMILY MEDICINE

## 2022-04-11 PROCEDURE — 73590 X-RAY EXAM OF LOWER LEG: CPT | Mod: 26,LT,, | Performed by: RADIOLOGY

## 2022-04-11 PROCEDURE — 93971 EXTREMITY STUDY: CPT | Mod: TC,LT

## 2022-04-11 PROCEDURE — 93971 US LOWER EXTREMITY VEINS LEFT: ICD-10-PCS | Mod: 26,LT,, | Performed by: RADIOLOGY

## 2022-04-11 PROCEDURE — 36415 COLL VENOUS BLD VENIPUNCTURE: CPT | Mod: PO | Performed by: FAMILY MEDICINE

## 2022-04-11 PROCEDURE — 99999 PR PBB SHADOW E&M-EST. PATIENT-LVL V: ICD-10-PCS | Mod: PBBFAC,,, | Performed by: FAMILY MEDICINE

## 2022-04-11 PROCEDURE — 99215 PR OFFICE/OUTPT VISIT, EST, LEVL V, 40-54 MIN: ICD-10-PCS | Mod: S$PBB,,, | Performed by: FAMILY MEDICINE

## 2022-04-11 PROCEDURE — 73590 X-RAY EXAM OF LOWER LEG: CPT | Mod: TC,FY,LT

## 2022-04-11 PROCEDURE — 99999 PR PBB SHADOW E&M-EST. PATIENT-LVL V: CPT | Mod: PBBFAC,,, | Performed by: FAMILY MEDICINE

## 2022-04-11 PROCEDURE — 99215 OFFICE O/P EST HI 40 MIN: CPT | Mod: PBBFAC,25,PO | Performed by: FAMILY MEDICINE

## 2022-04-11 PROCEDURE — 73590 XR TIBIA FIBULA 2 VIEW LEFT: ICD-10-PCS | Mod: 26,LT,, | Performed by: RADIOLOGY

## 2022-04-11 PROCEDURE — 99215 OFFICE O/P EST HI 40 MIN: CPT | Mod: S$PBB,,, | Performed by: FAMILY MEDICINE

## 2022-04-11 RX ORDER — DIPHENHYDRAMINE HCL 50 MG
50 CAPSULE ORAL EVERY 6 HOURS PRN
Qty: 5 CAPSULE | Refills: 0 | Status: SHIPPED | OUTPATIENT
Start: 2022-04-11 | End: 2022-04-11 | Stop reason: SDUPTHER

## 2022-04-11 RX ORDER — DIPHENHYDRAMINE HCL 50 MG
CAPSULE ORAL
Qty: 5 CAPSULE | Refills: 0 | Status: SHIPPED | OUTPATIENT
Start: 2022-04-11

## 2022-04-11 RX ORDER — PREDNISONE 20 MG/1
TABLET ORAL
Qty: 10 TABLET | Refills: 0 | Status: SHIPPED | OUTPATIENT
Start: 2022-04-11 | End: 2022-07-20

## 2022-04-11 NOTE — TELEPHONE ENCOUNTER
Spoken w/ patient. RX sent. Instruction regarding directions of medications and future teo at [place].This has been fully explained to the patient, who indicates understanding.

## 2022-04-11 NOTE — TELEPHONE ENCOUNTER
----- Message from Trevor Benito sent at 4/11/2022 12:56 PM CDT -----  Contact: Pt  Type: Needs Medical Advice    Who Called:Pt  Best Call Back Number:[phone]    Additional Information Requesting a call back regarding Pt was calling in regards to CT Dr ordered pt stated they are allergic to the dye radiology wanted to see if Dr could put and order in for US of lower left leg instead please call Thank you  Please Advise-Thank you

## 2022-04-11 NOTE — TELEPHONE ENCOUNTER
Left voicemail. Received secure text today from PCP. Sounds like he will need to be premedicated with prednisone for imaging in the future. This is not yet in med list.     In my message, I asked pt to message me with prednisone dose and when he will be taking this. At that point, will provider temporary pump settings adjustments.

## 2022-04-11 NOTE — TELEPHONE ENCOUNTER
Spoken with patient regarding results of X rays,and US. He was instructed regarding Prednisone and Benadryl directions. Also clarify regarding CT AM at [place].This has been fully explained to the patient, who indicates understanding.

## 2022-04-11 NOTE — PROGRESS NOTES
Subjective:       Patient ID: Narciso Becerra is a 66 y.o. male.    Chief Complaint: Follow-up    Enlarged breast  Breast mass for the last 2 months. Previous one 20 years ago rt breast bx.    Left leg trauma, over strech left leg. Swollen calf .  Leg Pain   The incident occurred 2 days ago. The incident occurred at home. The injury mechanism is unknown (over extension). The pain is present in the left leg (left calf swollen, and tender, not red.). The quality of the pain is described as cramping and shooting. The pain is at a severity of 8/10. The pain is severe. The pain has been constant since onset. Associated symptoms include an inability to bear weight and muscle weakness. He reports no foreign bodies present. The symptoms are aggravated by movement, palpation and weight bearing. He has tried ice and immobilization for the symptoms. The treatment provided mild relief.   Chest Pain   This is a new (bilateral breast mass and tenderness) problem. Episode onset: last 2 months. last episodes 20 yrs ago rt breast, biopsy benign. The onset quality is gradual. The problem occurs daily. The problem has been unchanged. The pain is at a severity of 3/10. The pain is moderate. Radiates to: RT and left breast tenderness. Associated symptoms include leg pain. Pertinent negatives include no abdominal pain, dizziness, headaches, palpitations or shortness of breath. The pain is aggravated by nothing.   His past medical history is significant for CAD, diabetes, hyperlipidemia, hypertension and thyroid problem.   His family medical history is significant for diabetes, heart disease and hyperlipidemia.     Review of Systems   Constitutional: Negative for fatigue and unexpected weight change.   Respiratory: Negative for chest tightness and shortness of breath.    Cardiovascular: Positive for chest pain. Negative for palpitations and leg swelling.   Gastrointestinal: Negative for abdominal pain.   Musculoskeletal: Negative for  arthralgias.   Neurological: Negative for dizziness, syncope, light-headedness and headaches.       Patient Active Problem List   Diagnosis    CKD (chronic kidney disease) stage 3, GFR 30-59 ml/min    Type 1 diabetes mellitus with diabetic polyneuropathy    Peripheral neuropathy    Insulin pump status    Hypertension associated with diabetes    Hyperlipidemia due to type 1 diabetes mellitus    Encounter for long-term (current) use of insulin    Adult situational stress disorder    Type 1 diabetes mellitus with retinopathy    Type 1 diabetes mellitus with stage 3 chronic kidney disease    Subclinical hypothyroidism    Left retinal detachment    Coronary artery disease, h/o CABG + stents    Claudication of both lower extremities    Erectile dysfunction due to arterial insufficiency    BPH with urinary obstruction       Objective:      Physical Exam  Vitals reviewed.   Constitutional:       General: He is not in acute distress.     Appearance: He is well-developed. He is not diaphoretic.   HENT:      Head: Normocephalic and atraumatic.      Right Ear: External ear normal.      Left Ear: External ear normal.      Nose: Nose normal.      Mouth/Throat:      Pharynx: No oropharyngeal exudate.   Eyes:      General: No scleral icterus.        Right eye: No discharge.         Left eye: No discharge.      Conjunctiva/sclera: Conjunctivae normal.      Pupils: Pupils are equal, round, and reactive to light.   Neck:      Thyroid: No thyromegaly.      Vascular: No JVD.      Trachea: No tracheal deviation.   Cardiovascular:      Rate and Rhythm: Normal rate.      Heart sounds: Normal heart sounds. No murmur heard.  Pulmonary:      Effort: Pulmonary effort is normal. No respiratory distress.      Breath sounds: Normal breath sounds. No wheezing or rales.   Chest:      Chest wall: Swelling and tenderness present.   Breasts:      Right: Swelling and tenderness present. No inverted nipple, nipple discharge, axillary  adenopathy or supraclavicular adenopathy.      Left: Swelling and tenderness present. No nipple discharge, skin change, axillary adenopathy or supraclavicular adenopathy.            Comments: 1.RT breast  7 O clock lesion 20-30 mm non mobile  2.Left breast sub areolar 2.5 cm mobile tender lesion.  Breast exam: breasts appear normal, no suspicious masses, no skin or nipple changes or axillary nodes, surgical scars noted rt breast.No axillary, no clavicular nor breast retractions.    Abdominal:      General: Bowel sounds are normal. There is no distension.      Palpations: Abdomen is soft. There is no mass.      Tenderness: There is no abdominal tenderness. There is no guarding or rebound.   Musculoskeletal:      Cervical back: Normal range of motion and neck supple.      Right foot: Normal. No swelling, deformity or bunion.      Left foot: Swelling, deformity and tenderness present.        Legs:       Comments: 3x 2 soft tissue mass, no red. Dorsiflexion tenderness.        Lymphadenopathy:      Cervical: No cervical adenopathy.      Upper Body:      Right upper body: No supraclavicular or axillary adenopathy.      Left upper body: No supraclavicular or axillary adenopathy.   Skin:     General: Skin is warm and dry.      Coloration: Skin is not pale.      Findings: No erythema or rash.   Neurological:      Mental Status: He is alert and oriented to person, place, and time.      Cranial Nerves: No cranial nerve deficit.      Coordination: Coordination normal.   Psychiatric:         Behavior: Behavior normal.         Thought Content: Thought content normal.         Judgment: Judgment normal.         Lab Results   Component Value Date    WBC 7.34 03/14/2022    HGB 11.8 (L) 03/14/2022    HCT 34.5 (L) 03/14/2022     03/14/2022    CHOL 149 10/18/2021    TRIG 72 10/18/2021    HDL 64 10/18/2021    ALT 25 01/18/2022    AST 26 01/18/2022     (L) 02/08/2022    K 5.1 02/08/2022     02/08/2022    CREATININE 1.1  02/08/2022    BUN 9 02/08/2022    CO2 26 02/08/2022    TSH 4.418 (H) 10/18/2021    PSA 0.99 05/07/2020    HGBA1C 8.1 (H) 01/18/2022     The 10-year ASCVD risk score (Yocasta LEWIS Jr., et al., 2013) is: 25.4%    Values used to calculate the score:      Age: 66 years      Sex: Male      Is Non- : No      Diabetic: Yes      Tobacco smoker: No      Systolic Blood Pressure: 130 mmHg      Is BP treated: Yes      HDL Cholesterol: 64 mg/dL      Total Cholesterol: 149 mg/dL    Assessment:       1. Subareolar gynecomastia in male    2. Stage 3 chronic kidney disease, unspecified whether stage 3a or 3b CKD    3. Localized swelling, mass, or lump of left lower extremity        Plan:       Subareolar gynecomastia in male  -     Mammo Digital Diagnostic Bilat; Future; Expected date: 04/11/2022  -     US Breast Bilateral Complete; Future; Expected date: 04/11/2022  -     TESTOSTERONE PANEL; Future; Expected date: 04/11/2022  -     Prolactin; Future; Expected date: 04/11/2022  -     Estradiol; Future; Expected date: 04/11/2022  -     Follicle Stimulating Hormone; Future; Expected date: 04/11/2022    Stage 3 chronic kidney disease, unspecified whether stage 3a or 3b CKD  -     ERYTHROPOIETIN; Future; Expected date: 04/11/2022  -     Reticulocytes; Future; Expected date: 04/11/2022  -     Creatinine, serum; Future; Expected date: 04/11/2022    Localized swelling, mass, or lump of left lower extremity  -     Ambulatory referral/consult to Orthopedics; Future; Expected date: 04/18/2022  -     CT Leg (Tibia-Fibula) Wtih Contrast Left; Future; Expected date: 04/11/2022  -     X-Ray Tibia Fibula 2 View Left; Future; Expected date: 04/11/2022  -     predniSONE (DELTASONE) 20 MG tablet; Prednisone 50 mg by mouth 13, 7 and 1 before the CT contrast  Dispense: 10 tablet; Refill: 0  -     diphenhydrAMINE (BENADRYL) 50 MG capsule; Take 1 capsule (50 mg total) by mouth every 6 (six) hours as needed for Itching or Allergies  (take night before and 1 hr before the procedure and bedtime.).  Dispense: 5 capsule; Refill: 0  -     US Lower Extremity Veins Left; Future; Expected date: 04/11/2022    Personal history of allergy to contrast media (used for diagnostic x-rays)  -     predniSONE (DELTASONE) 20 MG tablet; Prednisone 50 mg by mouth 13, 7 and 1 before the CT contrast  Dispense: 10 tablet; Refill: 0  -     diphenhydrAMINE (BENADRYL) 50 MG capsule; Take 1 capsule (50 mg total) by mouth every 6 (six) hours as needed for Itching or Allergies (take night before and 1 hr before the procedure and bedtime.).  Dispense: 5 capsule; Refill: 0    Apply a compressive ACE bandage. Rest and elevate the affected painful area.  Apply cold compresses intermittently as needed.  As pain recedes, begin normal activities slowly as tolerated.  No weight bearing for a week. Needs ortho and when the edema goes done consider MRI.Use a cane.Call if symptoms persist.  [place] a 10% insulin sliding scale as follows: on a QID schedule, subtract 200 from glucose value, and administer 10% of that number as additional units of regular insulin. Call physician if glucose is over 400. (eg: glucose= 280, subtract 200 giving 80, take 10% of 80 giving 8 Units additional regular insulin).  This has been fully explained to the patient, who indicates understanding.  Advised to call back directly if there are further questions, or if these symptoms fail to improve as anticipated or worsen.    Patient notify of neg US for DVt . Also informed of CT report of partial tear of gastrocnemius muscle.  Pending Mammogram.

## 2022-04-12 ENCOUNTER — TELEPHONE (OUTPATIENT)
Dept: RADIOLOGY | Facility: HOSPITAL | Age: 67
End: 2022-04-12
Payer: MEDICARE

## 2022-04-12 ENCOUNTER — HOSPITAL ENCOUNTER (OUTPATIENT)
Dept: RADIOLOGY | Facility: HOSPITAL | Age: 67
Discharge: HOME | End: 2022-04-12
Attending: FAMILY MEDICINE
Payer: MEDICARE

## 2022-04-12 LAB
ESTRADIOL SERPL-MCNC: 21 PG/ML (ref 11–44)
FSH SERPL-ACNC: 5.35 MIU/ML (ref 0.95–11.95)
PROLACTIN SERPL IA-MCNC: 16.1 NG/ML (ref 3.5–19.4)
RETICS/RBC NFR AUTO: 1.9 % (ref 0.4–2)

## 2022-04-12 PROCEDURE — 73701 CT LEG (TIBIA-FIBULA) WITH CONTRAST LEFT: ICD-10-PCS | Mod: 26,LT,, | Performed by: RADIOLOGY

## 2022-04-12 PROCEDURE — 73701 CT LOWER EXTREMITY W/DYE: CPT | Mod: 26,LT,, | Performed by: RADIOLOGY

## 2022-04-12 PROCEDURE — 25500020 PHARM REV CODE 255: Mod: PO | Performed by: FAMILY MEDICINE

## 2022-04-12 PROCEDURE — 73701 CT LOWER EXTREMITY W/DYE: CPT | Mod: TC,PO,LT

## 2022-04-12 RX ADMIN — IOHEXOL 75 ML: 350 INJECTION, SOLUTION INTRAVENOUS at 12:04

## 2022-04-12 NOTE — TELEPHONE ENCOUNTER
Received call from patient stating he needs to schedule mammo and us ordered by Dr. Coronado. Scheduled bilat diag mammo and bilat us for left breast mass. Patient states he had right breast mass 21 years ago at [facility], has film, no report but it was negative.     Scheduled on 4/28/22 at 830 am at [address] [facility] [address] radiology    Dr. Coronado notified via this note

## 2022-04-12 NOTE — TELEPHONE ENCOUNTER
Patient's rash is improved.  Blood sugars stable less than 170. CT scan results with positive partial tear of gastrocnemius muscle.  Appointment with Dr. Derrek Loyd arrange for Thursday.  Patient agrees and understands.

## 2022-04-13 ENCOUNTER — PATIENT OUTREACH (OUTPATIENT)
Dept: ADMINISTRATIVE | Facility: OTHER | Age: 67
End: 2022-04-13
Payer: MEDICARE

## 2022-04-13 NOTE — PROGRESS NOTES
Chart was reviewed for overdue [facility]active [facility] Encounters ([facility])  topics  Updates were requested from care everywhere  Health Maintenance has been updated  LINKS immunization registry triggered

## 2022-04-14 ENCOUNTER — OFFICE VISIT (OUTPATIENT)
Dept: ORTHOPEDICS | Facility: CLINIC | Age: 67
End: 2022-04-14
Payer: MEDICARE

## 2022-04-14 VITALS — BODY MASS INDEX: 26.14 KG/M2 | WEIGHT: 182.56 LBS | HEIGHT: 70 IN | RESPIRATION RATE: 16 BRPM

## 2022-04-14 DIAGNOSIS — S86.112A RUPTURE OF MEDIAL HEAD OF GASTROCNEMIUS, LEFT, INITIAL ENCOUNTER: ICD-10-CM

## 2022-04-14 LAB — EPO SERPL-ACNC: 8.5 MIU/ML (ref 2.6–18.5)

## 2022-04-14 PROCEDURE — 99204 OFFICE O/P NEW MOD 45 MIN: CPT | Mod: S$PBB,,, | Performed by: ORTHOPAEDIC SURGERY

## 2022-04-14 PROCEDURE — 99215 OFFICE O/P EST HI 40 MIN: CPT | Mod: PBBFAC,PN | Performed by: ORTHOPAEDIC SURGERY

## 2022-04-14 PROCEDURE — 99999 PR PBB SHADOW E&M-EST. PATIENT-LVL V: CPT | Mod: PBBFAC,,, | Performed by: ORTHOPAEDIC SURGERY

## 2022-04-14 PROCEDURE — 99204 PR OFFICE/OUTPT VISIT, NEW, LEVL IV, 45-59 MIN: ICD-10-PCS | Mod: S$PBB,,, | Performed by: ORTHOPAEDIC SURGERY

## 2022-04-14 PROCEDURE — 99999 PR PBB SHADOW E&M-EST. PATIENT-LVL V: ICD-10-PCS | Mod: PBBFAC,,, | Performed by: ORTHOPAEDIC SURGERY

## 2022-04-14 NOTE — PROGRESS NOTES
CC:  66-year-old male presents for evaluation of left leg pain.  The patient states that a few days ago he was stepping into a [place] on when he tripped with his right foot.  He then hyperflexed the left calf to brace himself from falling.  He had immediate onset of pain over the medial aspect of his popliteal fossa.  He is feels like he felt and heard a pop.  He has had pain and swelling in the left lower extremity since that time.  He had an ultrasound that was negative for DVT but showed a complex cystic structure around the medial head of his gastrocs.  A CT scan also confirmed that.  Those are both available for review today.  Currently rates pain as a 6/10 and states that it is progressively getting better every day.    Past Medical History:   Diagnosis Date    Anticoagulant long-term use     Anxiety     CKD (chronic kidney disease) stage 3, GFR 30-59 ml/min 4/8/2014    COPD (chronic obstructive pulmonary disease)     GERD (gastroesophageal reflux disease)     Peripheral neuropathy 6/23/2015    Retinopathy of both eyes     Subclinical hypothyroidism 8/16/2018       Past Surgical History:   Procedure Laterality Date    blood transfution      BREAST MASS EXCISION      CARDIAC CATHETERIZATION      CARDIAC SURGERY      march 2012, 5 vessels    CERVICAL FUSION  2018    CORONARY ANGIOGRAPHY N/A 7/7/2020    Procedure: ANGIOGRAM, CORONARY ARTERY;  Surgeon: Chai Grider MD;  [facility];  Service: Cardiology;  Laterality: N/A;    CORONARY ANGIOGRAPHY INCLUDING BYPASS GRAFTS WITH CATHETERIZATION OF LEFT HEART N/A 7/7/2020    Procedure: ANGIOGRAM, CORONARY, INCLUDING BYPASS GRAFT, WITH LEFT HEART CATHETERIZATION;  Surgeon: Chai Grider MD;  Location: [facility];  Service: Cardiology;  Laterality: N/A;    CORONARY ANGIOPLASTY      CORONARY ARTERY BYPASS GRAFT      EYE SURGERY      laser    RETINAL DETACHMENT SURGERY Left 06/01/2020    SPINE SURGERY  11/02/2020    lumbar lamaectomy Dr Csaarez  [facility]       Current Outpatient Medications on File Prior to Visit   Medication Sig Dispense Refill    (No Medication Selected) Inject into the skin continuous. Insulin pump      ascorbic acid, vitamin C, (VITAMIN C) 1000 MG tablet Take 2,000 mg by mouth once daily.      ascorbic acid, vitamin C, (VITAMIN C) 1000 MG tablet 2 tablets      aspirin (ECOTRIN) 81 MG EC tablet Take 81 mg by mouth once daily.      atorvastatin (LIPITOR) 40 MG tablet Take 1 tablet (40 mg total) by mouth once daily. 90 tablet 3    B-complex with vitamin C (Z-BEC OR EQUIV) tablet Take 1 tablet by mouth once daily.      blood-glucose sensor (DEXCOM G4 SENSOR) Fabby Please change sensor every 7 days 13 Device 3    calcium carbonate/vitamin D3 (VITAMIN D-3 ORAL) Take 2,000 mg by mouth once daily.      cholecalciferol, vitamin D3, (VITAMIN D3) 50 mcg (2,000 unit) Tab 1 tablet      cholecalciferol, vitamin D3, (VITAMIN D3) 50 mcg (2,000 unit) Tab 1 tablet      clopidogreL (PLAVIX) 75 mg tablet 1 tablet      COMBIGAN 0.2-0.5 % Drop Place 1 drop into both eyes 2 (two) times daily.      diphenhydrAMINE (BENADRYL) 50 MG capsule 50 mg capsule tonight, 1 hr before the CT and bedtime after CT. 5 capsule 0    EScitalopram oxalate (LEXAPRO) 10 MG tablet TAKE 1 TABLET(10 MG) BY MOUTH EVERY DAY 90 tablet 3    folic acid (FOLVITE) 1 MG tablet TAKE 1 TABLET BY MOUTH DAILY (Patient not taking: Reported on 4/11/2022) 90 tablet 3    FREESTYLE TEST Strp CHECK BLOOD GLUCOSE 6 TIMES PER DAY - BEFORE MEALS AND AT BEDTIME. TO USE WITH OMNIPOD INSULIN PUMP PDM. DX CODE e10.42 600 strip 3    gabapentin (NEURONTIN) 100 MG capsule Take 1 capsule (100 mg total) by mouth 3 (three) times daily. 90 capsule 0    glucagon, human recombinant, (GLUCAGON EMERGENCY KIT, HUMAN,) 1 mg SolR Inject 1 mg into the muscle as needed (for severe hypoglycemia). 1 each 3    HUMALOG U-100 INSULIN 100 unit/mL injection For continuous use  "in insulin pump. Max TDD 60 units 60 mL 3    insulin (LANTUS SOLOSTAR U-100 INSULIN) glargine 100 units/mL (3mL) SubQ pen Inject 16 Units into the skin daily as needed (use in event of insulin pump hiatus). 15 each 6    insulin lispro (HUMALOG KWIKPEN INSULIN) 100 unit/mL pen Use three times daily with meals using I:C ratio 1:6 + correction max TDD 30u. Use in the event of insulin pump hiatus 15 each 6    insulin pump cartridge (OMNIPOD DASH 5 PACK POD) Crtg Inject 1 Device into the skin continuous. Omnipod DASH pods. Change every 3 days 30 each 3    insulin syringe-needle U-100 1/2 mL 31 x 5/16" Syrg 1 each by Misc.(Non-Drug; Combo Route) route 4 (four) times daily. 400 each 0    lancets Misc Check blood glucose 6x/day. e10.42 600 each 3    levothyroxine (SYNTHROID) 25 MCG tablet 1 tablet in the morning on an empty stomach      multivit with min-folic acid 0.4 mg Tab 1 tablet      multivitamin capsule Take 1 capsule by mouth once daily.      nitroGLYCERIN 0.4 MG/DOSE TL SPRY (NITROLINGUAL) 400 mcg/spray spray Place 1 spray under the tongue every 5 (five) minutes as needed for Chest pain. 1 g 12    OMNIPOD DASH PDM KIT Misc 1 Device by Misc.(Non-Drug; Combo Route) route continuous. 1 each 0    pen needle, diabetic (BD ULTRA-FINE JUANA PEN NEEDLE) 32 gauge x 5/32" Ndle USE 4 TIMES DAILY 200 each 3    prednisoLONE acetate (PRED FORTE) 1 % DrpS SHAKE LIQUID AND INSTILL 1 DROP IN LEFT EYE FOUR TIMES DAILY      predniSONE (DELTASONE) 20 MG tablet Prednisone 50 mg by mouth 13, 7 and 1 before the CT contrast 10 tablet 0    triamcinolone acetonide 0.1% (KENALOG) 0.1 % cream SMARTSI Application Topical 2-3 Times Daily      valACYclovir (VALTREX) 1000 MG tablet Take 1 tablet (1,000 mg total) by mouth 3 (three) times daily. for 7 days 21 tablet 0    zinc gluconate 50 mg tablet Take 50 mg by mouth once daily.       No current facility-administered medications on file prior to visit.       ROS:    Constitution: " Denies chills, fever, and sweats.  HENT: Denies headaches or blurry vision.  Cardiovascular: Denies chest pain or irregular heart beat.  Respiratory: Denies cough or shortness of breath.  Gastrointestinal: Denies abdominal pain, nausea, or vomiting.  Genitourinary:  Denies urinary incontinence, bladder and kidney issues  Musculoskeletal:  Denies muscle cramps.  Neurological: Denies dizziness or focal weakness.  Psychiatric/Behavioral: Normal mental status.  Hematologic/Lymphatic: Denies bleeding problem or easy bruising/bleeding.  Skin: Denies rash or suspicious lesions.    Physical examination     Gen - No acute distress, well nourished, well groomed   Eyes - Extraoccular motions intact, pupils equally round and reactive to light and accommodation   ENT - normocephalic, atruamtic, oropharynx clear   Neck - Supple, no abnormal masses   Cardiovascular - regular rate and rhythm   Pulmonary - clear to auscultation bilaterally, no wheezes, ronchi, or rales   Abdomen - soft, non-tender, non-distended, positive bowel sounds   Psych - The patient is alert and oriented x3 with normal mood and affect    Examination of the Left Lower Extremity:     Skin intact throughout.  Motor function is intact distally EHL/FHL/TA/kevin   +2 dorsalis pedis and posterior tibial pulses   Sensation to light touch intact distally dorsal, plantar, and first web space     Examination of the Left knee:    ROM 0 - 150   Effusion negative  Tenderness to palpation at the joint line positive over the medial head of the gastroc  Pain during range of motion positive  Crepitation during range of motion negative     negative increased pain noted with flexion past 90   positive antalgic gait noted   negative Lachman's Test   negative Anterior Drawer Test   negative Posterior Drawer Test   negative McMurrays Test   negative Disco Test   negative Varus/Valgus instability      CT images were examined and personally interpreted by me.  CT of the left lower leg  dated 04/12/2022 shows a complex cyst adjacent to the medial head of the gastroc consistent with partial tearing of the medial head of the gastroc.    Dx:  Tear of the medial head of the gastrocs of the left leg    Plan:  I reviewed the images with the patient.  I discussed with him that this is going to take time to heal.  He should continue to get better over the next 4-6 weeks.  Follow up p.r.n..

## 2022-04-16 LAB
ALBUMIN SERPL-MCNC: 4.6 G/DL (ref 3.6–5.1)
SHBG SERPL-SCNC: 37 NMOL/L (ref 22–77)
TESTOST FREE SERPL-MCNC: 25.7 PG/ML (ref 46–224)
TESTOST SERPL-MCNC: 229 NG/DL (ref 250–1100)
TESTOSTERONE.FREE+WB SERPL-MCNC: 54.1 NG/DL (ref 110–575)

## 2022-04-20 ENCOUNTER — PATIENT MESSAGE (OUTPATIENT)
Dept: ENDOCRINOLOGY | Facility: CLINIC | Age: 67
End: 2022-04-20
Payer: MEDICARE

## 2022-04-25 ENCOUNTER — LAB VISIT (OUTPATIENT)
Dept: LAB | Facility: HOSPITAL | Age: 67
End: 2022-04-25
Attending: NURSE PRACTITIONER
Payer: MEDICARE

## 2022-04-25 DIAGNOSIS — E10.42 TYPE 1 DIABETES MELLITUS WITH DIABETIC POLYNEUROPATHY: ICD-10-CM

## 2022-04-25 DIAGNOSIS — E03.8 SUBCLINICAL HYPOTHYROIDISM: ICD-10-CM

## 2022-04-25 LAB
ESTIMATED AVG GLUCOSE: 163 MG/DL (ref 68–131)
HBA1C MFR BLD: 7.3 % (ref 4–5.6)

## 2022-04-25 PROCEDURE — 83036 HEMOGLOBIN GLYCOSYLATED A1C: CPT | Performed by: NURSE PRACTITIONER

## 2022-04-25 PROCEDURE — 36415 COLL VENOUS BLD VENIPUNCTURE: CPT | Mod: PO | Performed by: NURSE PRACTITIONER

## 2022-04-25 PROCEDURE — 84443 ASSAY THYROID STIM HORMONE: CPT | Performed by: NURSE PRACTITIONER

## 2022-04-26 LAB — TSH SERPL DL<=0.005 MIU/L-ACNC: 3.6 UIU/ML (ref 0.4–4)

## 2022-04-28 ENCOUNTER — HOSPITAL ENCOUNTER (OUTPATIENT)
Dept: RADIOLOGY | Facility: HOSPITAL | Age: 67
Discharge: HOME | End: 2022-04-28
Attending: FAMILY MEDICINE
Payer: MEDICARE

## 2022-04-28 DIAGNOSIS — N62 SUBAREOLAR GYNECOMASTIA IN MALE: ICD-10-CM

## 2022-04-28 PROCEDURE — 77062 BREAST TOMOSYNTHESIS BI: CPT | Mod: 26,,, | Performed by: RADIOLOGY

## 2022-04-28 PROCEDURE — 76642 ULTRASOUND BREAST LIMITED: CPT | Mod: TC,50,PO

## 2022-04-28 PROCEDURE — 77062 MAMMO DIGITAL DIAGNOSTIC BILAT WITH TOMO: ICD-10-PCS | Mod: 26,,, | Performed by: RADIOLOGY

## 2022-04-28 PROCEDURE — 77066 DX MAMMO INCL CAD BI: CPT | Mod: TC,PO

## 2022-04-28 PROCEDURE — 76642 US BREAST BILATERAL LIMITED: ICD-10-PCS | Mod: 26,50,, | Performed by: RADIOLOGY

## 2022-04-28 PROCEDURE — 77066 DX MAMMO INCL CAD BI: CPT | Mod: 26,,, | Performed by: RADIOLOGY

## 2022-04-28 PROCEDURE — 77066 MAMMO DIGITAL DIAGNOSTIC BILAT WITH TOMO: ICD-10-PCS | Mod: 26,,, | Performed by: RADIOLOGY

## 2022-04-28 PROCEDURE — 76642 ULTRASOUND BREAST LIMITED: CPT | Mod: 26,50,, | Performed by: RADIOLOGY

## 2022-05-02 ENCOUNTER — OFFICE VISIT (OUTPATIENT)
Dept: ENDOCRINOLOGY | Facility: CLINIC | Age: 67
End: 2022-05-02
Payer: MEDICARE

## 2022-05-02 VITALS
HEIGHT: 70 IN | BODY MASS INDEX: 26.82 KG/M2 | DIASTOLIC BLOOD PRESSURE: 72 MMHG | HEART RATE: 88 BPM | SYSTOLIC BLOOD PRESSURE: 122 MMHG | WEIGHT: 187.38 LBS

## 2022-05-02 DIAGNOSIS — E10.42 TYPE 1 DIABETES MELLITUS WITH DIABETIC POLYNEUROPATHY: Primary | ICD-10-CM

## 2022-05-02 DIAGNOSIS — I25.10 CORONARY ARTERY DISEASE DUE TO CALCIFIED CORONARY LESION: ICD-10-CM

## 2022-05-02 DIAGNOSIS — I15.2 HYPERTENSION ASSOCIATED WITH DIABETES: ICD-10-CM

## 2022-05-02 DIAGNOSIS — E78.5 HYPERLIPIDEMIA DUE TO TYPE 1 DIABETES MELLITUS: ICD-10-CM

## 2022-05-02 DIAGNOSIS — E10.22 TYPE 1 DIABETES MELLITUS WITH STAGE 3B CHRONIC KIDNEY DISEASE: ICD-10-CM

## 2022-05-02 DIAGNOSIS — Z46.81 INSULIN PUMP FITTING OR ADJUSTMENT: ICD-10-CM

## 2022-05-02 DIAGNOSIS — I25.84 CORONARY ARTERY DISEASE DUE TO CALCIFIED CORONARY LESION: ICD-10-CM

## 2022-05-02 DIAGNOSIS — E10.69 HYPERLIPIDEMIA DUE TO TYPE 1 DIABETES MELLITUS: ICD-10-CM

## 2022-05-02 DIAGNOSIS — N18.32 TYPE 1 DIABETES MELLITUS WITH STAGE 3B CHRONIC KIDNEY DISEASE: ICD-10-CM

## 2022-05-02 DIAGNOSIS — E11.59 HYPERTENSION ASSOCIATED WITH DIABETES: ICD-10-CM

## 2022-05-02 DIAGNOSIS — E10.319 TYPE 1 DIABETES MELLITUS WITH RETINOPATHY, MACULAR EDEMA PRESENCE UNSPECIFIED, UNSPECIFIED LATERALITY, UNSPECIFIED RETINOPATHY SEVERITY: ICD-10-CM

## 2022-05-02 DIAGNOSIS — E03.8 SUBCLINICAL HYPOTHYROIDISM: ICD-10-CM

## 2022-05-02 PROCEDURE — 99999 PR PBB SHADOW E&M-EST. PATIENT-LVL V: CPT | Mod: PBBFAC,,, | Performed by: NURSE PRACTITIONER

## 2022-05-02 PROCEDURE — 99214 OFFICE O/P EST MOD 30 MIN: CPT | Mod: S$PBB,,, | Performed by: NURSE PRACTITIONER

## 2022-05-02 PROCEDURE — 99999 PR PBB SHADOW E&M-EST. PATIENT-LVL V: ICD-10-PCS | Mod: PBBFAC,,, | Performed by: NURSE PRACTITIONER

## 2022-05-02 PROCEDURE — 99214 PR OFFICE/OUTPT VISIT, EST, LEVL IV, 30-39 MIN: ICD-10-PCS | Mod: S$PBB,,, | Performed by: NURSE PRACTITIONER

## 2022-05-02 PROCEDURE — 95251 CONT GLUC MNTR ANALYSIS I&R: CPT | Mod: ,,, | Performed by: NURSE PRACTITIONER

## 2022-05-02 PROCEDURE — 95251 PR GLUCOSE MONITOR, 72 HOUR, PHYS INTERP: ICD-10-PCS | Mod: ,,, | Performed by: NURSE PRACTITIONER

## 2022-05-02 PROCEDURE — 99215 OFFICE O/P EST HI 40 MIN: CPT | Mod: PBBFAC,PO | Performed by: NURSE PRACTITIONER

## 2022-05-02 RX ORDER — DIPHENHYDRAMINE HYDROCHLORIDE 25 MG/1
CAPSULE ORAL
COMMUNITY
Start: 2022-04-11 | End: 2022-05-02

## 2022-05-02 RX ORDER — ACETAMINOPHEN 500 MG
TABLET ORAL
COMMUNITY

## 2022-05-02 NOTE — PATIENT INSTRUCTIONS
Basal:  0000 - 0.8 u/hr  0600 - 0.5 u/hr  1200 - 0.7 u/hr    I:C ratio:  0000 - 1:5    ISF:    0000 - 30    Target:   0000 - 100-120    Active insulin time: 3 hours

## 2022-05-02 NOTE — PROGRESS NOTES
CC: Mr. Narciso Becerra arrives today for management of Type 1  DM and review of chronic medical conditions, as listed in the visit diagnosis section of this encounter.     HPI: Mr. Narciso Becerra was diagnosed with Type 1  DM at age 18 after viral illness. Denies FH of DM. Denies hospitalizations due to DM.   Wears Dexcom G5.  He started insulin pump therapy with Omnipod in June 2015.  Has CAD, is s/p CABG in 2012 and follows with cardiology (Dr. Cedeño).  Follows with Dr. Estrada every 3-6 months for CKD.     Patient was last seen by me in January. At this time, afternoon basal rate was increased. ISF was also adjusted to provide more insulin for correction.     He states that he has been entering ghost carbs in an effort to reduce glucose levels during the day. He states that he usually skips breakfast and notices his glucoses increase anyway.    BG monitoring per Dexcom G6.     Hypoglycemia: Occasionally in the morning. Had a low early this morning.   Hypoglycemic Symptoms: dizziness, jitteriness and sweating   Hypoglycemia Treatment: juice    Exercise: No formal.     Dietary Habits: Eats 2 meals/day. Eats lunch and dinner (usually 3PM and 9PM). Occasional snacking. Avoids sugary beverages.     Last DM education appointment: 6/8/2021 - [employer] training          CURRENT DIABETIC MEDS: Humalog in Omnipod DASH  Glucometer type: Freestyle strips  Insulin back up plan: Lantus 17 units daily, Humalog per carb counting    Name of Device or Devices used by the patient: Omnipod  When did you start the current therapy you are on: 6/2015  Frequency of changing site/infusion set/tubing/cartridges: 3 days  Frequency of changing CGM: 10 days  Using bolus wizard: yes  Taking bolus with each meal: yes    PUMP SETTINGS:  Basal:  0000 - 0.9 u/hr  0600 - 0.4 u/hr  1200 - 0.6 u/hr    I:C ratio:  0000 - 1:6    ISF:    0000 - 30    Target:   0000 - 100-120    Active insulin time: 3 hours      Last Eye Exam: 4/2022 +  " Follows every 3 months. Sees Dr. Easton. Past L retina detachment x 3. H/o cataract sx.   Last Podiatry Exam: n/a    REVIEW OF SYSTEMS  Constitutional: no c/o weakness, fatigue, weight loss. + 4# weight gain  Cardiac: no palpitations or chest pain.  Respiratory: no cough or dyspnea.  GI: no c/o abdominal pain or nausea.  Skin: no lesions or rashes.  Neuro: + mild numbness, tingling to BLE.   Endocrine: denies polyphagia, polydipsia, polyuria      Personally reviewed Past Medical, Surgical, Social History.    Vital Signs  /72   Pulse 88   Ht 5' 10" (1.778 m)   Wt 85 kg (187 lb 6.3 oz)   BMI 26.89 kg/m²     Personally reviewed the below labs:      Hemoglobin A1C   Date Value Ref Range Status   04/25/2022 7.3 (H) 4.0 - 5.6 % Final     Comment:     ADA Screening Guidelines:  5.7-6.4%  Consistent with prediabetes  >or=6.5%  Consistent with diabetes    High levels of fetal hemoglobin interfere with the HbA1C  assay. Heterozygous hemoglobin variants (HbS, HgC, etc)do  not significantly interfere with this assay.   However, presence of multiple variants may affect accuracy.     01/18/2022 8.1 (H) 4.0 - 5.6 % Final     Comment:     ADA Screening Guidelines:  5.7-6.4%  Consistent with prediabetes  >or=6.5%  Consistent with diabetes    High levels of fetal hemoglobin interfere with the HbA1C  assay. Heterozygous hemoglobin variants (HbS, HgC, etc)do  not significantly interfere with this assay.   However, presence of multiple variants may affect accuracy.     10/18/2021 7.5 (H) 4.0 - 5.6 % Final     Comment:     ADA Screening Guidelines:  5.7-6.4%  Consistent with prediabetes  >or=6.5%  Consistent with diabetes    High levels of fetal hemoglobin interfere with the HbA1C  assay. Heterozygous hemoglobin variants (HbS, HgC, etc)do  not significantly interfere with this assay.   However, presence of multiple variants may affect accuracy.         Chemistry        Component Value Date/Time     (L) 02/08/2022 " 1317    K 5.1 02/08/2022 1317     02/08/2022 1317    CO2 26 02/08/2022 1317    BUN 9 02/08/2022 1317    CREATININE 1.3 04/12/2022 1146     (H) 02/08/2022 1317        Component Value Date/Time    CALCIUM 9.4 02/08/2022 1317    ALKPHOS 78 01/18/2022 1153    AST 26 01/18/2022 1153    ALT 25 01/18/2022 1153    BILITOT 0.6 01/18/2022 1153          Lab Results   Component Value Date    CHOL 149 10/18/2021    CHOL 152 12/02/2020    CHOL 139 11/07/2019     Lab Results   Component Value Date    HDL 64 10/18/2021    HDL 52 12/02/2020    HDL 59 11/07/2019     Lab Results   Component Value Date    LDLCALC 70.6 10/18/2021    LDLCALC 82.2 12/02/2020    LDLCALC 60.0 (L) 11/07/2019     Lab Results   Component Value Date    TRIG 72 10/18/2021    TRIG 89 12/02/2020    TRIG 100 11/07/2019     Lab Results   Component Value Date    CHOLHDL 43.0 10/18/2021    CHOLHDL 34.2 12/02/2020    CHOLHDL 42.4 11/07/2019       Lab Results   Component Value Date    MICALBCREAT Unable to calculate 10/18/2021     Lab Results   Component Value Date    TSH 3.601 04/25/2022       CrCl cannot be calculated (Patient's most recent lab result is older than the maximum 7 days allowed.).    No results found for: [id number]      PHYSICAL EXAMINATION  Constitutional: Appears well, no distress  Neck: Supple, trachea midline  Respiratory: CTA, even and unlabored.  Cardiovascular: RRR, no murmurs, no carotid bruits.   GI: active bowel sounds, no hernia  Skin: warm and dry.  Neuro: oriented to person, place, time   Feet: appropriate footwear.         PUMP/SENSOR DOWNLOAD: See media file for details. Fasting glucoses are often within goal but some fasting hypoglycemia also noted. Glucoses typically increase in the morning through mid day. Occasional borderline hypoglycemia in the afternoon. Prandial excursions noted.   Average TDD: 52.7 u  Basal: 27% (14.1 u)  Bolus: 73%       Average glucose: 175  Above 250 mg/dL: 12 %  181-250 mg/dL: 32 %    mg/dL: 55 %  54-69 mg/dL: <1 %  Below 54 mg/dL: <1 %           Goals      HEMOGLOBIN A1C < 7.5             Assessment/Plan  1. Type 1 diabetes mellitus with diabetic polyneuropathy        -- A1c has decreased. However, patient entering ost carbs. Appears to need basal rate increase during the day and a decrease in the overnight rate.  -- change basal rates as follows:  0000 - 0.8 u/hr  0600 - 0.5 u/hr  1200 - 0.7 u/hr  -- change I:C ratio to 1:5  -- continue Dexcom G6     -- Discussed diagnosis of DM, A1c goals, progression of disease, long term complications and tx options.  Advised patient to check BG before activities, such as driving or exercise.  -- Reviewed hypoglycemia management: treat with 1/2 glass of juice, 1/2 can regular coke, or 4 glucose tablets. Monitor and repeat treatment every 15 minutes until BG is >70 Then have a snack, which includes a complex carbohydrate and protein.    -- takes aspirin, statin   2. Type 1 diabetes mellitus with retinopathy, macular edema presence unspecified, unspecified laterality, unspecified retinopathy severity  -- keep follow ups   3. Type 1 diabetes mellitus with stage 3 chronic kidney disease  -- avoid hypoglycemia  -- follows with nephrology   4. Coronary artery disease due to calcified coronary lesion  -- avoid hypoglycemia  -- follows with cardiology    5. Hypertension associated with diabetes  -- controlled  -- defer med mgt to cardiology or nephrology   6. Hyperlipidemia due to type 1 diabetes mellitus  -- controlled  -- continue statin   7. Insulin pump adjustment  -- download reviewed and settings changed   8. Subclinical hypothyroidism  -- controlled  -- continue current levothyroxine dose          FOLLOW UP  Follow up in about 3 months (around 8/2/2022).   Patient instructed to bring BG logs to each follow up   Patient encouraged to call for any BG/medication issues, concerns, or questions.    Orders Placed This Encounter   Procedures    Hemoglobin A1C     Comprehensive Metabolic Panel

## 2022-05-09 ENCOUNTER — PATIENT MESSAGE (OUTPATIENT)
Dept: SMOKING CESSATION | Facility: CLINIC | Age: 67
End: 2022-05-09
Payer: MEDICARE

## 2022-05-12 ENCOUNTER — OFFICE VISIT (OUTPATIENT)
Dept: FAMILY MEDICINE | Facility: CLINIC | Age: 67
End: 2022-05-12
Payer: MEDICARE

## 2022-05-12 VITALS
SYSTOLIC BLOOD PRESSURE: 130 MMHG | WEIGHT: 184.75 LBS | TEMPERATURE: 98 F | OXYGEN SATURATION: 97 % | RESPIRATION RATE: 16 BRPM | DIASTOLIC BLOOD PRESSURE: 64 MMHG | BODY MASS INDEX: 26.45 KG/M2 | HEIGHT: 70 IN | HEART RATE: 75 BPM

## 2022-05-12 DIAGNOSIS — I15.2 HYPERTENSION ASSOCIATED WITH DIABETES: ICD-10-CM

## 2022-05-12 DIAGNOSIS — Z78.9 STATIN INTOLERANCE: ICD-10-CM

## 2022-05-12 DIAGNOSIS — S86.112A GASTROCNEMIUS MUSCLE TEAR, LEFT, INITIAL ENCOUNTER: ICD-10-CM

## 2022-05-12 DIAGNOSIS — N18.30 STAGE 3 CHRONIC KIDNEY DISEASE, UNSPECIFIED WHETHER STAGE 3A OR 3B CKD: Primary | ICD-10-CM

## 2022-05-12 DIAGNOSIS — E10.69 HYPERLIPIDEMIA DUE TO TYPE 1 DIABETES MELLITUS: ICD-10-CM

## 2022-05-12 DIAGNOSIS — E78.5 HYPERLIPIDEMIA DUE TO TYPE 1 DIABETES MELLITUS: ICD-10-CM

## 2022-05-12 DIAGNOSIS — I73.9 PERIPHERAL VASCULAR DISEASE, UNSPECIFIED: ICD-10-CM

## 2022-05-12 DIAGNOSIS — E11.59 HYPERTENSION ASSOCIATED WITH DIABETES: ICD-10-CM

## 2022-05-12 DIAGNOSIS — Z12.5 PROSTATE CANCER SCREENING: ICD-10-CM

## 2022-05-12 PROCEDURE — 99215 OFFICE O/P EST HI 40 MIN: CPT | Mod: PBBFAC,PO | Performed by: FAMILY MEDICINE

## 2022-05-12 PROCEDURE — 99214 OFFICE O/P EST MOD 30 MIN: CPT | Mod: S$PBB,,, | Performed by: FAMILY MEDICINE

## 2022-05-12 PROCEDURE — 99999 PR PBB SHADOW E&M-EST. PATIENT-LVL V: ICD-10-PCS | Mod: PBBFAC,,, | Performed by: FAMILY MEDICINE

## 2022-05-12 PROCEDURE — 99214 PR OFFICE/OUTPT VISIT, EST, LEVL IV, 30-39 MIN: ICD-10-PCS | Mod: S$PBB,,, | Performed by: FAMILY MEDICINE

## 2022-05-12 PROCEDURE — 99999 PR PBB SHADOW E&M-EST. PATIENT-LVL V: CPT | Mod: PBBFAC,,, | Performed by: FAMILY MEDICINE

## 2022-05-12 NOTE — PROGRESS NOTES
. [facility]     Subjective:    Chief Complaint:   Chief Complaint   Patient presents with    Follow-up       History of Present Illness:  66 y.o. male presents for multiple issues.   56-year-old male that presents follow-up upper partial tear of gastrocnemius muscle of the left leg.  Continued to have moderate swelling.  Denies fever chills no claudication.  He has not been using Ace bandage no heat known home support stockings.  He has indeed moderate of vascular disease.  He has type 1 diabetes CAD hyperlipidemia Chronic kidney disease 3.      I reviewed the patients chart dating back for the past few appointments. See above.    The following portions of the patient's history were reviewed and updated as appropriate: allergies, current medications, past family history, past medical history, past social history, past surgical history and problem list.    He denies chest pain upon exertion, dyspnea, nausea, vomiting, diaphoresis, and syncope. No pleuritic chest pain, unilateral leg swelling, calf tenderness, or calf pain.      Past Medical History:   Diagnosis Date    Anticoagulant long-term use     Anxiety     CKD (chronic kidney disease) stage 3, GFR 30-59 ml/min 4/8/2014    COPD (chronic obstructive pulmonary disease)     GERD (gastroesophageal reflux disease)     Peripheral neuropathy 6/23/2015    Retinopathy of both eyes     Subclinical hypothyroidism 8/16/2018       Past Surgical History:   Procedure Laterality Date    blood transfution      BREAST MASS EXCISION      CARDIAC CATHETERIZATION      CARDIAC SURGERY      march 2012, 5 vessels    CERVICAL FUSION  2018    CORONARY ANGIOGRAPHY N/A 7/7/2020    Procedure: ANGIOGRAM, CORONARY ARTERY;  Surgeon: Chai Grider MD;  Location: [facility];  Service: Cardiology;  Laterality: N/A;    CORONARY ANGIOGRAPHY INCLUDING BYPASS GRAFTS WITH CATHETERIZATION OF LEFT HEART N/A 7/7/2020    Procedure: ANGIOGRAM, CORONARY, INCLUDING BYPASS GRAFT,  WITH LEFT HEART CATHETERIZATION;  Surgeon: Chai Grider MD;  [facility];  Service: Cardiology;  Laterality: N/A;    CORONARY ANGIOPLASTY      CORONARY ARTERY BYPASS GRAFT      EYE SURGERY      laser    RETINAL DETACHMENT SURGERY Left 2020    SPINE SURGERY  2020    lumbar lamaectomy Dr Leida Alexander / [facility]       Social History  Social History     Tobacco Use    Smoking status: Former Smoker     Types: Cigars     Start date: 1978     Quit date: 1995     Years since quittin.3    Smokeless tobacco: Never Used   Substance Use Topics    Alcohol use: Yes     Comment: weekly beers    Drug use: No       Family History   Problem Relation Age of Onset    Heart disease Mother     Cancer Mother 91        lung    Lung cancer Mother         smoker    Aneurysm Father 65        AAA    Heart disease Paternal Grandfather     Cancer Brother         lung    Early death Brother     Lung cancer Brother         smoker    No Known Problems Sister     No Known Problems Daughter     No Known Problems Son     Macular degeneration Maternal Aunt     Cancer Maternal Uncle 80    Heart disease Paternal Uncle     Early death Paternal Grandmother 30     Review of patient's allergies indicates:   Allergen Reactions    Iodinated contrast media Hives       Patient needs to be scheduled at hospital if iodine contrast is absolutely needed for CT and premedicated with 50 mg prednisone 13, 7, and 1 hour before test and 50 mg benadryl 1 hour before test. CT on 22 patient was premedicated and still had an allergic reaction of hives.     Iodine Hives     Other reaction(s): Unknown  Patient needs to be scheduled at hospital if iodine contrast is absolutely needed for CT and premedicated with 50 mg prednisone 13, 7, and 1 hour before test and 50 mg benadryl 1 hour before test. CT on 22 patient was premedicated and still had an allergic reaction of hives.      "Neomycin Dermatitis     Reaction to Ophthalmic preparation    Codeine     Doxycycline hyclate Nausea And Vomiting    Pneumovax-23 [pneumococcal 23-duarte ps vaccine]      Rash, fever    Shellfish containing products Other (See Comments)       Review of Systems [Negative unless checked off]    General ROS: []fever, []chills, [x]weight loss, []malaise/fatigue.  ENT ROS: []congestion, []rhinorrhea,  []sore throat, []neck pain, []hearing loss.  Ophthalmic ROS:[]blurry vision, [] double vision, []photophobia, []eye pain.  Respiratory ROS: []cough, [x]pleuritic chest pain, []shortness of breath, []wheezing.  CVS ROS:[]chest pain, []dyspnea on exertion, []palpitations, []orthopnea, []leg swelling, []PND.   GI ROS: []nausea, []vomiting, [] epigastric pain, []abd pain, []diarrhea, []constipation, []blood/melena in stool.   Urology ROS:[]dysuria, []frequency, []flank pain,[] trouble voiding, [] hematuria.   MSK ROS: []myalgias, []joint pain, [x]muscular weakness,  []back pain, [] falls.   Derm ROS: []pruritis, [x]rash, []jaundice.  Neurological:[]dizziness,[]numbness,[]loss of consciousness, [x]weakness  []headaches.   Psych ROS: []hallucinations, []depression, []anxiety, []suicidal ideation.    Physical Examination  /64   Pulse 75   Temp 97.8 °F (36.6 °C) (Oral)   Resp 16   Ht 5' 10" (1.778 m)   Wt 83.8 kg (184 lb 11.9 oz)   SpO2 97%   BMI 26.51 kg/m²   Wt Readings from Last 3 Encounters:   05/12/22 83.8 kg (184 lb 11.9 oz)   05/02/22 85 kg (187 lb 6.3 oz)   04/14/22 82.8 kg (182 lb 8.7 oz)     BP Readings from Last 3 Encounters:   05/12/22 130/64   05/02/22 122/72   04/11/22 130/62     Estimated body mass index is 26.51 kg/m² as calculated from the following:    Height as of this encounter: 5' 10" (1.778 m).    Weight as of this encounter: 83.8 kg (184 lb 11.9 oz).     General appearance: alert, cooperative, no distress  Eyes: pupils equal and reactive, extraocular eye movements intact, decreased peripheral " vision   Ears: bilateral TM's and external ear canals normal, right ear normal, left ear normal, Decreased hearing, mild loss.   Nose: normal and patent, no erythema, discharge or polyps   Sinuses: Normal paranasal sinuses without tenderness   Throat: mucous membranes moist, pharynx normal without lesions   Neck: no thyromegaly, trachea midline  Lungs: clear to auscultation, no wheezes, rales or rhonchi, symmetric air entry, no dullness to percussion bilaterally.  Heart: normal rate, regular rhythm, normal S1, S2, no murmurs, rubs, clicks or gallops, no displacement of the PMI.  Abdomen: soft, nontender, nondistended, no masses or organomegaly No rigidity, rebound, or guarding.   Back: full range of motion, no tenderness, palpable spasm or pain on motion   Extremities: peripheral pulses normal, no pedal edema, no clubbing or cyanosis, left cough moderate swollen 37 cm   Right 32 cm wide.  Left calf with mild to moderate tenderness no erythema no warm no palpable cords.   Feet: warm, good capillary refill.  Neurological:alert, oriented, normal speech, no focal findings or movement disorder noted   Psychiatric: alert, oriented to person, place, and time  Integument: normal coloration and turgor, no rashes, no suspicious skin lesions noted.    Data reviewed    Previous medical records reviewed and summarized above in HPI. 274}    Laboratory    I have reviewed old labs below:   274}  Lab Results   Component Value Date    WBC 7.34 03/14/2022    HGB 11.8 (L) 03/14/2022    HCT 34.5 (L) 03/14/2022    MCV 96 03/14/2022     03/14/2022     (L) 02/08/2022    K 5.1 02/08/2022     02/08/2022    CALCIUM 9.4 02/08/2022    PHOS 2.7 02/08/2022    CO2 26 02/08/2022     (H) 02/08/2022    BUN 9 02/08/2022    CREATININE 1.3 04/12/2022    ANIONGAP 6 (L) 02/08/2022    ESTGFRAFRICA >60 04/12/2022    EGFRNONAA 56 (A) 04/12/2022    PROT 7.0 01/18/2022    ALBUMIN 4.6 04/11/2022    BILITOT 0.6 01/18/2022    ALKPHOS 78  01/18/2022    ALT 25 01/18/2022    AST 26 01/18/2022    CHOL 149 10/18/2021    TRIG 72 10/18/2021    HDL 64 10/18/2021    LDLCALC 70.6 10/18/2021    TSH 3.601 04/25/2022    PSA 0.99 05/07/2020    HGBA1C 7.3 (H) 04/25/2022     There have been some probable medication with Lipitor compliance issues here. I have discussed with him the benefits of using Lipitor ;importance of following the treatment plan exactly as directed in order to achieve a good medical outcome.  After discussion of the treatment of hyperlipidemia, a statin-drug is chosen; prescription for Lipitor (atorvastatin) once daily is written. The patient is informed that this type of drug is usually highly effective to lower LDL cholesterol and is usually very well tolerated. However, statin-type drugs can potentially injure the liver. Blood tests will be required at regular intervals for the duration of therapy.  Damage to the liver, if detected early, can be reversed by stopping the drug.  Be alert for persistent nausea, abdominal pain, or yellow jaundice, and report such to me directly. Statin drugs may also cause skeletal muscle injury in rare cases. Be alert for pronounced persistent diffuse muscle pain and discontinue the drug immediately should such symptoms develop. Grapefruit juice may increase the blood levels and side effects of some HMG Co-A reductase inhibitors (Zocor, Lipitor and Mevacor but not Pravachol or Lescol). Patient is counseled in this regard.  Discontinue Lipitor due to myopathy.  Patient should be a candidate for using Praluent and Zetia.  Isosorbide even though he has episodes of low blood pressure to improve his cardiac capacity/ endurance.  Patient has significant peripheral vascular disease , by taking Plavix and aspirin .  He is he he has a partial gastrectomy tear of the left thigh with persistent swelling.  I recommended him to apply Ace bandage.  During the day.  Re-evaluate in 6 weeks related with another to ultra sound  possible physical therapy.  Imaging/Tracing: I have reviewed the pertinent results/findings and my personal findings are below:  274}    Assessment/Plan     274}    Narciso Becerra is a 66 y.o. male who presents to clinic with:    1. Stage 3 chronic kidney disease, unspecified whether stage 3a or 3b CKD    2. Hypertension associated with diabetes    3. Peripheral vascular disease, unspecified    4. Prostate cancer screening    5. Gastrocnemius muscle tear, left, initial encounter    6. Hyperlipidemia due to type 1 diabetes mellitus    7. Statin intolerance         Diagnostic impression remarks       1. Stage 3 chronic kidney disease, unspecified whether stage 3a or 3b CKD    2. Hypertension associated with diabetes    3. Peripheral vascular disease, unspecified    4. Prostate cancer screening  - PSA, Screening; Future    5. Gastrocnemius muscle tear, left, initial encounter    6. Hyperlipidemia due to type 1 diabetes mellitus  - alirocumab (PRALUENT PEN) 75 mg/mL PnIj; Inject 1 mL (75 mg total) into the skin every 14 (fourteen) days.  Dispense: 2 each; Refill: 6    7. Statin intolerance  - alirocumab (PRALUENT PEN) 75 mg/mL PnIj; Inject 1 mL (75 mg total) into the skin every 14 (fourteen) days.  Dispense: 2 each; Refill: 6      Medication Monitoring: In today's visit, monitoring for drug toxicity was accomplished. Proper use of medications was discussed.     Counseling: Counseling included discussion regarding imaging findings, diagnosis, possibilities, treatment options, medications, risks, and benefits. He had many questions regarding the options and long-term effects. All questions were answered. He expressed understanding after counseling regarding the diagnosis and recommendations. He was capable and demonstrated competence with understanding of these options. Shared decision making was performed resulting in him choosing the current treatment plan.     He was counseled about the importance of healthy  dietary habits as well as routine physical activity and exercise for better health outcomes. I also discussed the importance of cancer screening.     I also discussed the importance of close follow up to discuss labs, change or modify his medications if needed, monitor side effects, and further evaluation of medical problems.     Additional workup planned: see labs ordered below.    See below for labs and meds ordered with associated diagnosis      Medication List with Changes/Refills   New Medications    ALIROCUMAB (PRALUENT PEN) 75 MG/ML PNIJ    Inject 1 mL (75 mg total) into the skin every 14 (fourteen) days.   Current Medications    (NO MEDICATION SELECTED)    Inject into the skin continuous. Insulin pump    ASCORBIC ACID, VITAMIN C, (VITAMIN C) 1000 MG TABLET    2 tablets    ASPIRIN (ECOTRIN) 81 MG EC TABLET    Take 81 mg by mouth once daily.    ATORVASTATIN (LIPITOR) 40 MG TABLET    Take 1 tablet (40 mg total) by mouth once daily.    B-COMPLEX WITH VITAMIN C (Z-BEC OR EQUIV) TABLET    Take 1 tablet by mouth once daily.    BLOOD-GLUCOSE SENSOR (DEXCOM G4 SENSOR) WILLIAMS    Please change sensor every 7 days    CHOLECALCIFEROL, VITAMIN D3, (VITAMIN D3) 50 MCG (2,000 UNIT) CAP CAPSULE    1 tablet    CLOPIDOGREL (PLAVIX) 75 MG TABLET    1 tablet    COMBIGAN 0.2-0.5 % DROP    Place 1 drop into both eyes 2 (two) times daily.    DIPHENHYDRAMINE (BENADRYL) 50 MG CAPSULE    50 mg capsule tonight, 1 hr before the CT and bedtime after CT.    ESCITALOPRAM OXALATE (LEXAPRO) 10 MG TABLET    TAKE 1 TABLET(10 MG) BY MOUTH EVERY DAY    FREESTYLE TEST STRP    CHECK BLOOD GLUCOSE 6 TIMES PER DAY - BEFORE MEALS AND AT BEDTIME. TO USE WITH OMNIPOD INSULIN PUMP PDM. DX CODE e10.42    GABAPENTIN (NEURONTIN) 100 MG CAPSULE    Take 1 capsule (100 mg total) by mouth 3 (three) times daily.    GLUCAGON, HUMAN RECOMBINANT, (GLUCAGON EMERGENCY KIT, HUMAN,) 1 MG SOLR    Inject 1 mg into the muscle as needed (for severe hypoglycemia).    HUMALOG  "U-100 INSULIN 100 UNIT/ML INJECTION    For continuous use in insulin pump. Max TDD 60 units    INSULIN (LANTUS SOLOSTAR U-100 INSULIN) GLARGINE 100 UNITS/ML (3ML) SUBQ PEN    Inject 16 Units into the skin daily as needed (use in event of insulin pump hiatus).    INSULIN LISPRO (HUMALOG KWIKPEN INSULIN) 100 UNIT/ML PEN    Use three times daily with meals using I:C ratio 1:6 + correction max TDD 30u. Use in the event of insulin pump hiatus    INSULIN SYRINGE-NEEDLE U-100 1/2 ML 31 X 5/16" SYRG    1 each by Misc.(Non-Drug; Combo Route) route 4 (four) times daily.    LANCETS MISC    Check blood glucose 6x/day. e10.42    LEVOTHYROXINE (SYNTHROID) 25 MCG TABLET    1 tablet in the morning on an empty stomach    MULTIVIT WITH MIN-FOLIC ACID 0.4 MG TAB    1 tablet    MULTIVITAMIN CAPSULE    Take 1 capsule by mouth once daily.    NITROGLYCERIN 0.4 MG/DOSE TL SPRY (NITROLINGUAL) 400 MCG/SPRAY SPRAY    Place 1 spray under the tongue every 5 (five) minutes as needed for Chest pain.    OMNIPOD DASH PDM KIT MISC    1 Device by Misc.(Non-Drug; Combo Route) route continuous.    OMNIPOD DASH POD, GEN 4, CRTG    CHANGE EVERY 3 DAYS.    PEN NEEDLE, DIABETIC (BD ULTRA-FINE JUANA PEN NEEDLE) 32 GAUGE X 5/32" NDLE    USE 4 TIMES DAILY    PREDNISOLONE ACETATE (PRED FORTE) 1 % DRPS    SHAKE LIQUID AND INSTILL 1 DROP IN LEFT EYE FOUR TIMES DAILY    PREDNISONE (DELTASONE) 20 MG TABLET    Prednisone 50 mg by mouth 13, 7 and 1 before the CT contrast    TRIAMCINOLONE ACETONIDE 0.1% (KENALOG) 0.1 % CREAM    SMARTSI Application Topical 2-3 Times Daily    VALACYCLOVIR (VALTREX) 1000 MG TABLET    Take 1 tablet (1,000 mg total) by mouth 3 (three) times daily. for 7 days    ZINC GLUCONATE 50 MG TABLET    Take 50 mg by mouth once daily.     Modified Medications    No medications on file       Follow up in about 6 weeks (around 2022). for further workup and reassessment if labs and tests obtained are stable or sooner as needed. He was instructed " "to call the clinic or go to the emergency department if his symptoms do not improve, worsens, or if new symptoms develop. Patient knows to call any time if an emergency arises. Shared decision making occurred and he verbalized understanding in agreement with this plan.     Documentation entered by me for this encounter may have been done in part using speech-recognition technology. Although I have made an effort to ensure accuracy, "sound like" errors may exist and should be interpreted in context.       Jefe Coronado MD     Discussed health maintenance guidelines appropriate for age.    "

## 2022-05-13 ENCOUNTER — LAB VISIT (OUTPATIENT)
Dept: LAB | Facility: HOSPITAL | Age: 67
End: 2022-05-13
Attending: FAMILY MEDICINE
Payer: MEDICARE

## 2022-05-13 DIAGNOSIS — Z12.5 PROSTATE CANCER SCREENING: ICD-10-CM

## 2022-05-13 LAB — COMPLEXED PSA SERPL-MCNC: 0.84 NG/ML (ref 0–4)

## 2022-05-13 PROCEDURE — 84153 ASSAY OF PSA TOTAL: CPT | Performed by: FAMILY MEDICINE

## 2022-05-13 PROCEDURE — 36415 COLL VENOUS BLD VENIPUNCTURE: CPT | Mod: PO | Performed by: FAMILY MEDICINE

## 2022-05-18 ENCOUNTER — PES CALL (OUTPATIENT)
Dept: ADMINISTRATIVE | Facility: CLINIC | Age: 67
End: 2022-05-18
Payer: MEDICARE

## 2022-06-10 ENCOUNTER — OFFICE VISIT (OUTPATIENT)
Dept: FAMILY MEDICINE | Facility: CLINIC | Age: 67
End: 2022-06-10
Payer: MEDICARE

## 2022-06-10 VITALS
OXYGEN SATURATION: 97 % | HEIGHT: 70 IN | HEART RATE: 78 BPM | TEMPERATURE: 98 F | WEIGHT: 181.44 LBS | DIASTOLIC BLOOD PRESSURE: 60 MMHG | SYSTOLIC BLOOD PRESSURE: 122 MMHG | RESPIRATION RATE: 17 BRPM | BODY MASS INDEX: 25.98 KG/M2

## 2022-06-10 DIAGNOSIS — E78.5 HYPERLIPIDEMIA DUE TO TYPE 1 DIABETES MELLITUS: ICD-10-CM

## 2022-06-10 DIAGNOSIS — E10.69 HYPERLIPIDEMIA DUE TO TYPE 1 DIABETES MELLITUS: ICD-10-CM

## 2022-06-10 DIAGNOSIS — S86.112A GASTROCNEMIUS MUSCLE TEAR, LEFT, INITIAL ENCOUNTER: Primary | ICD-10-CM

## 2022-06-10 DIAGNOSIS — I15.2 HYPERTENSION ASSOCIATED WITH DIABETES: ICD-10-CM

## 2022-06-10 DIAGNOSIS — E11.59 HYPERTENSION ASSOCIATED WITH DIABETES: ICD-10-CM

## 2022-06-10 DIAGNOSIS — E10.319 TYPE 1 DIABETES MELLITUS WITH RETINOPATHY, MACULAR EDEMA PRESENCE UNSPECIFIED, UNSPECIFIED LATERALITY, UNSPECIFIED RETINOPATHY SEVERITY: ICD-10-CM

## 2022-06-10 PROCEDURE — 99215 OFFICE O/P EST HI 40 MIN: CPT | Mod: PBBFAC,PO | Performed by: PHYSICIAN ASSISTANT

## 2022-06-10 PROCEDURE — 99214 OFFICE O/P EST MOD 30 MIN: CPT | Mod: S$PBB,,, | Performed by: PHYSICIAN ASSISTANT

## 2022-06-10 PROCEDURE — 99999 PR PBB SHADOW E&M-EST. PATIENT-LVL V: CPT | Mod: PBBFAC,,, | Performed by: PHYSICIAN ASSISTANT

## 2022-06-10 PROCEDURE — 99999 PR PBB SHADOW E&M-EST. PATIENT-LVL V: ICD-10-PCS | Mod: PBBFAC,,, | Performed by: PHYSICIAN ASSISTANT

## 2022-06-10 PROCEDURE — 99214 PR OFFICE/OUTPT VISIT, EST, LEVL IV, 30-39 MIN: ICD-10-PCS | Mod: S$PBB,,, | Performed by: PHYSICIAN ASSISTANT

## 2022-06-10 NOTE — PROGRESS NOTES
Subjective:       Patient ID: Narciso Becerra is a 66 y.o. male.    Chief Complaint: Follow-up (6 week f/u )    Mr. Becerra is a 66 year old male with HTN, DM, and HLD who presents to clinic today for 6 week follow up after tearing a muscle in his left calf. The patient followed up with orthopedist who advised patient no intervention was needed and that the tear would heal with time. The patient reports significant improvement in pain and swelling. He is able to walk normally with no significant discomfort. (He was previously using a cane). The patient also had breast imaging that revealed no concerning findings other than gynecomastia. The patient reports overall he is feeling much better and he has no new complaints today. The patient is followed by cardiologist, Dr. Ornelas; nephrologist, Dr. Estrada; and ophthalmology, [facility]  20/[facility].    Review of patient's allergies indicates:   Allergen Reactions    Iodinated contrast media Hives       Patient needs to be scheduled at hospital if iodine contrast is absolutely needed for CT and premedicated with 50 mg prednisone 13, 7, and 1 hour before test and 50 mg benadryl 1 hour before test. CT on 4/12/22 patient was premedicated and still had an allergic reaction of hives.     Iodine Hives     Other reaction(s): Unknown  Patient needs to be scheduled at hospital if iodine contrast is absolutely needed for CT and premedicated with 50 mg prednisone 13, 7, and 1 hour before test and 50 mg benadryl 1 hour before test. CT on 4/12/22 patient was premedicated and still had an allergic reaction of hives.     Neomycin Dermatitis     Reaction to Ophthalmic preparation    Codeine     Doxycycline hyclate Nausea And Vomiting    Metoprolol     Pneumovax-23 [pneumococcal 23-duarte ps vaccine]      Rash, fever    Shellfish containing products Other (See Comments)         Current Outpatient Medications:     (No Medication Selected), Inject into the skin continuous. Insulin  pump, Disp: , Rfl:     ascorbic acid, vitamin C, (VITAMIN C) 1000 MG tablet, 2 tablets, Disp: , Rfl:     aspirin (ECOTRIN) 81 MG EC tablet, Take 81 mg by mouth once daily., Disp: , Rfl:     atorvastatin (LIPITOR) 40 MG tablet, Take 1 tablet (40 mg total) by mouth once daily., Disp: 90 tablet, Rfl: 3    B-complex with vitamin C (Z-BEC OR EQUIV) tablet, Take 1 tablet by mouth once daily., Disp: , Rfl:     bisoprolol (ZEBETA) 5 MG tablet, TAKE 1/2 TABLET(2.5 MG) BY MOUTH EVERY EVENING, Disp: 45 tablet, Rfl: 0    blood-glucose sensor (DEXCOM G4 SENSOR) Fab[place], Please change sensor every 7 days, Disp: 13 Device, Rfl: 3    brimonidine 0.2% (ALPHAGAN) 0.2 % Drop, 1 drop every 8 (eight) hours., Disp: , Rfl:     cholecalciferol, vitamin D3, (VITAMIN D3) 50 mcg (2,000 unit) Cap capsule, 1 tablet, Disp: , Rfl:     clopidogreL (PLAVIX) 75 mg tablet, 1 tablet, Disp: , Rfl:     COMBIGAN 0.2-0.5 % Drop, Place 1 drop into both eyes 2 (two) times daily., Disp: , Rfl:     diphenhydrAMINE (BENADRYL) 50 MG capsule, 50 mg capsule tonight, 1 hr before the CT and bedtime after CT., Disp: 5 capsule, Rfl: 0    EScitalopram oxalate (LEXAPRO) 10 MG tablet, TAKE 1 TABLET(10 MG) BY MOUTH EVERY DAY, Disp: 90 tablet, Rfl: 3    FREESTYLE TEST Strp, CHECK BLOOD GLUCOSE 6 TIMES PER DAY - BEFORE MEALS AND AT BEDTIME. TO USE WITH OMNIPOD INSULIN PUMP PDM. DX CODE e10.42, Disp: 600 strip, Rfl: 3    gabapentin (NEURONTIN) 100 MG capsule, Take 1 capsule (100 mg total) by mouth 3 (three) times daily., Disp: 90 capsule, Rfl: 0    HUMALOG U-100 INSULIN 100 unit/mL injection, For continuous use in insulin pump. Max TDD 60 units, Disp: 60 mL, Rfl: 3    insulin (LANTUS SOLOSTAR U-100 INSULIN) glargine 100 units/mL (3mL) SubQ pen, Inject 16 Units into the skin daily as needed (use in event of insulin pump hiatus)., Disp: 15 each, Rfl: 6    insulin lispro (HUMALOG KWIKPEN INSULIN) 100 unit/mL pen, Use three times daily with meals using I:C ratio  "1:6 + correction max TDD 30u. Use in the event of insulin pump hiatus, Disp: 15 each, Rfl: 6    insulin syringe-needle U-100 1/2 mL 31 x 5/16" Syrg, 1 each by Misc.(Non-Drug; Combo Route) route 4 (four) times daily., Disp: 400 each, Rfl: 0    isosorbide dinitrate (ISORDIL) 10 MG tablet, TAKE 1 TABLET(10 MG) BY MOUTH TWICE DAILY, Disp: 180 tablet, Rfl: 0    lancets Misc, Check blood glucose 6x/day. e10.42, Disp: 600 each, Rfl: 3    levothyroxine (SYNTHROID) 25 MCG tablet, 1 tablet in the morning on an empty stomach, Disp: , Rfl:     multivit with min-folic acid 0.4 mg Tab, 1 tablet, Disp: , Rfl:     multivitamin capsule, Take 1 capsule by mouth once daily., Disp: , Rfl:     nitroGLYCERIN 0.4 MG/DOSE TL SPRY (NITROLINGUAL) 400 mcg/spray spray, Place 1 spray under the tongue every 5 (five) minutes as needed for Chest pain., Disp: 1 g, Rfl: 12    OMNIPOD DASH PDM KIT Misc, 1 Device by Misc.(Non-Drug; Combo Route) route continuous., Disp: 1 each, Rfl: 0    OMNIPOD DASH POD, GEN 4, Crtg, CHANGE EVERY 3 DAYS., Disp: 30 each, Rfl: 3    pen needle, diabetic (BD ULTRA-FINE JUANA PEN NEEDLE) 32 gauge x 5/32" Ndle, USE 4 TIMES DAILY, Disp: 200 each, Rfl: 3    prednisoLONE acetate (PRED FORTE) 1 % DrpS, SHAKE LIQUID AND INSTILL 1 DROP IN LEFT EYE FOUR TIMES DAILY, Disp: , Rfl:     predniSONE (DELTASONE) 20 MG tablet, Prednisone 50 mg by mouth 13, 7 and 1 before the CT contrast, Disp: 10 tablet, Rfl: 0    triamcinolone acetonide 0.1% (KENALOG) 0.1 % cream, SMARTSI Application Topical 2-3 Times Daily, Disp: , Rfl:     zinc gluconate 50 mg tablet, Take 50 mg by mouth once daily., Disp: , Rfl:     glucagon, human recombinant, (GLUCAGON EMERGENCY KIT, HUMAN,) 1 mg SolR, Inject 1 mg into the muscle as needed (for severe hypoglycemia)., Disp: 1 each, Rfl: 3    valACYclovir (VALTREX) 1000 MG tablet, Take 1 tablet (1,000 mg total) by mouth 3 (three) times daily. for 7 days, Disp: 21 tablet, Rfl: 0    Lab Results "   Component Value Date    WBC 7.34 03/14/2022    HGB 11.8 (L) 03/14/2022    HCT 34.5 (L) 03/14/2022     03/14/2022    CHOL 149 10/18/2021    TRIG 72 10/18/2021    HDL 64 10/18/2021    ALT 25 01/18/2022    AST 26 01/18/2022     (L) 02/08/2022    K 5.1 02/08/2022     02/08/2022    CREATININE 1.3 04/12/2022    BUN 9 02/08/2022    CO2 26 02/08/2022    TSH 3.601 04/25/2022    PSA 0.84 05/13/2022    HGBA1C 7.3 (H) 04/25/2022       Review of Systems   Constitutional: Negative for activity change, appetite change and fever.   HENT: Negative for postnasal drip, rhinorrhea and sinus pressure.    Eyes: Negative for visual disturbance.   Respiratory: Negative for cough and shortness of breath.    Cardiovascular: Negative for chest pain.   Gastrointestinal: Negative for abdominal distention and abdominal pain.   Genitourinary: Negative for difficulty urinating and dysuria.   Musculoskeletal: Positive for myalgias. Negative for arthralgias.        Left calf pain- improving   Neurological: Negative for headaches.   Hematological: Negative for adenopathy.   Psychiatric/Behavioral: The patient is not nervous/anxious.        Objective:      Physical Exam  Constitutional:       Appearance: Normal appearance.   HENT:      Head: Normocephalic and atraumatic.   Eyes:      Conjunctiva/sclera: Conjunctivae normal.   Cardiovascular:      Rate and Rhythm: Normal rate and regular rhythm.   Pulmonary:      Effort: Pulmonary effort is normal. No respiratory distress.      Breath sounds: Normal breath sounds. No wheezing.   Abdominal:      General: There is no distension.      Palpations: There is no mass.      Tenderness: There is no abdominal tenderness.   Musculoskeletal:      Comments: Mild edema of left lower extremity s/p muscle tear. Improving   Skin:     Findings: No erythema.   Neurological:      Mental Status: He is alert and oriented to person, place, and time.   Psychiatric:         Behavior: Behavior normal.          Assessment:       1. Gastrocnemius muscle tear, left, initial encounter    2. Hypertension associated with diabetes    3. Hyperlipidemia due to type 1 diabetes mellitus    4. Type 1 diabetes mellitus with retinopathy, macular edema presence unspecified, unspecified laterality, unspecified retinopathy severity        Plan:         Narciso was seen today for follow-up.    Diagnoses and all orders for this visit:    Gastrocnemius muscle tear, left, initial encounter  Patient improving  Elevate leg when possible  Consider ace bandage as ortho recommended or compression sleeve  If pain or swelling worsens, seek urgent medical attention.  Hypertension associated with diabetes  Controlled  Low salt diet  Continue current medication  Hyperlipidemia due to type 1 diabetes mellitus  High fiber diet  Continue current medication  Type 1 diabetes mellitus with retinopathy, macular edema presence unspecified, unspecified laterality, unspecified retinopathy severity  Diabetic diet  Follow up with endocrinology.

## 2022-06-10 NOTE — PATIENT INSTRUCTIONS
Armando Stuart,     If you are due for any health screening(s) below please notify me so we can arrange them to be ordered and scheduled to maintain your health. Most healthy patients complete it. Don't lose out on improving your health.     Health Maintenance   Topic Date Due    TETANUS VACCINE  05/09/2022    Lipid Panel  10/18/2022    Hemoglobin A1c  10/25/2022    Foot Exam  01/24/2023    Eye Exam  03/07/2023    PROSTATE-SPECIFIC ANTIGEN  05/13/2023    High Dose Statin  06/10/2023    Hepatitis C Screening  Completed    Abdominal Aortic Aneurysm Screening  Completed

## 2022-06-27 ENCOUNTER — TELEPHONE (OUTPATIENT)
Dept: ENDOCRINOLOGY | Facility: CLINIC | Age: 67
End: 2022-06-27
Payer: MEDICARE

## 2022-07-18 ENCOUNTER — TELEPHONE (OUTPATIENT)
Dept: FAMILY MEDICINE | Facility: CLINIC | Age: 67
End: 2022-07-18
Payer: MEDICARE

## 2022-07-18 NOTE — TELEPHONE ENCOUNTER
Called and spoke to pt about setting up AWV  Appt set up for 7/20/2022 w/Ms Stephens at [facility]

## 2022-07-19 ENCOUNTER — TELEPHONE (OUTPATIENT)
Dept: ADMINISTRATIVE | Facility: CLINIC | Age: 67
End: 2022-07-19
Payer: MEDICARE

## 2022-07-20 ENCOUNTER — OFFICE VISIT (OUTPATIENT)
Dept: FAMILY MEDICINE | Facility: CLINIC | Age: 67
End: 2022-07-20
Payer: MEDICARE

## 2022-07-20 VITALS
SYSTOLIC BLOOD PRESSURE: 130 MMHG | HEART RATE: 68 BPM | HEIGHT: 70 IN | BODY MASS INDEX: 26.7 KG/M2 | OXYGEN SATURATION: 97 % | WEIGHT: 186.5 LBS | DIASTOLIC BLOOD PRESSURE: 70 MMHG

## 2022-07-20 DIAGNOSIS — Z00.00 ENCOUNTER FOR PREVENTIVE HEALTH EXAMINATION: Primary | ICD-10-CM

## 2022-07-20 DIAGNOSIS — N18.30 STAGE 3 CHRONIC KIDNEY DISEASE, UNSPECIFIED WHETHER STAGE 3A OR 3B CKD: Chronic | ICD-10-CM

## 2022-07-20 DIAGNOSIS — E03.8 SUBCLINICAL HYPOTHYROIDISM: ICD-10-CM

## 2022-07-20 DIAGNOSIS — Z96.41 INSULIN PUMP STATUS: ICD-10-CM

## 2022-07-20 DIAGNOSIS — I15.2 HYPERTENSION ASSOCIATED WITH DIABETES: ICD-10-CM

## 2022-07-20 DIAGNOSIS — E10.69 HYPERLIPIDEMIA DUE TO TYPE 1 DIABETES MELLITUS: ICD-10-CM

## 2022-07-20 DIAGNOSIS — E10.22 TYPE 1 DIABETES MELLITUS WITH STAGE 3 CHRONIC KIDNEY DISEASE, UNSPECIFIED WHETHER STAGE 3A OR 3B CKD: ICD-10-CM

## 2022-07-20 DIAGNOSIS — E11.59 HYPERTENSION ASSOCIATED WITH DIABETES: ICD-10-CM

## 2022-07-20 DIAGNOSIS — N18.30 TYPE 1 DIABETES MELLITUS WITH STAGE 3 CHRONIC KIDNEY DISEASE, UNSPECIFIED WHETHER STAGE 3A OR 3B CKD: ICD-10-CM

## 2022-07-20 DIAGNOSIS — E10.311 TYPE 1 DIABETES MELLITUS WITH RETINOPATHY AND MACULAR EDEMA, UNSPECIFIED LATERALITY, UNSPECIFIED RETINOPATHY SEVERITY: ICD-10-CM

## 2022-07-20 DIAGNOSIS — I73.9 PVD (PERIPHERAL VASCULAR DISEASE): ICD-10-CM

## 2022-07-20 DIAGNOSIS — E78.5 HYPERLIPIDEMIA DUE TO TYPE 1 DIABETES MELLITUS: ICD-10-CM

## 2022-07-20 DIAGNOSIS — E10.42 TYPE 1 DIABETES MELLITUS WITH DIABETIC POLYNEUROPATHY: ICD-10-CM

## 2022-07-20 DIAGNOSIS — I73.9 CLAUDICATION OF BOTH LOWER EXTREMITIES: Chronic | ICD-10-CM

## 2022-07-20 PROCEDURE — 99215 OFFICE O/P EST HI 40 MIN: CPT | Mod: PBBFAC,PO | Performed by: NURSE PRACTITIONER

## 2022-07-20 PROCEDURE — G0439 PR MEDICARE ANNUAL WELLNESS SUBSEQUENT VISIT: ICD-10-PCS | Mod: ,,, | Performed by: NURSE PRACTITIONER

## 2022-07-20 PROCEDURE — 99999 PR PBB SHADOW E&M-EST. PATIENT-LVL V: CPT | Mod: PBBFAC,,, | Performed by: NURSE PRACTITIONER

## 2022-07-20 PROCEDURE — 99999 PR PBB SHADOW E&M-EST. PATIENT-LVL V: ICD-10-PCS | Mod: PBBFAC,,, | Performed by: NURSE PRACTITIONER

## 2022-07-20 PROCEDURE — G0439 PPPS, SUBSEQ VISIT: HCPCS | Mod: ,,, | Performed by: NURSE PRACTITIONER

## 2022-07-20 NOTE — PATIENT INSTRUCTIONS
Counseling and Referral of Other Preventative  (Italic type indicates deductible and co-insurance are waived)    Patient Name: Narciso Becerra  Today's Date: 7/20/2022    Health Maintenance       Date Due Completion Date    Shingles Vaccine (1 of 2) Never done ---    COVID-19 Vaccine (4 - Booster for Moderna series) 04/21/2022 12/21/2021    TETANUS VACCINE 05/09/2022 5/9/2012 (Done)    Override on 5/9/2012: Done    Influenza Vaccine (1) 09/01/2022 10/15/2020    Override on 10/30/2019: Done    Diabetes Urine Screening 10/18/2022 10/18/2021    Lipid Panel 10/18/2022 10/18/2021    Hemoglobin A1c 10/25/2022 4/25/2022    Foot Exam 01/24/2023 1/24/2022    Override on 5/8/2015: Done    Eye Exam 04/29/2023 4/29/2022    Override on 11/1/2017: Done (Dr Shay Weeks     report sent to scanning     kb)    Override on 2/21/2017: Done (Dr Guero Hunter   report sent to scanning)    Override on 5/17/2016: Done (Dr Hunter sent to scanning)    PROSTATE-SPECIFIC ANTIGEN 05/13/2023 5/13/2022    High Dose Statin 07/11/2023 7/11/2022    Colorectal Cancer Screening 07/17/2027 7/17/2017 (Done)    Override on 7/17/2017: Done (in media)        No orders of the defined types were placed in this encounter.    The following information is provided to all patients.  This information is to help you find resources for any of the problems found today that may be affecting your health:                Living healthy guide: www.[facility].[place].gov      Understanding Diabetes: www.diabetes.org      Eating healthy: www.cdc.gov/healthyweight      CDC home safety checklist: www.cdc.gov/steadi/patient.html      Agency on Aging: www.goea.[place].gov      Alcoholics anonymous (AA): www.aa.org      Physical Activity: www.kiki.nih.gov/pk8hdbd      Tobacco use: www.quitwithusla.org

## 2022-07-20 NOTE — PROGRESS NOTES
"  Narciso Becerra presented for a  Medicare AWV and comprehensive Health Risk Assessment today. The following components were reviewed and updated:    · Medical history  · Family History  · Social history  · Allergies and Current Medications  · Health Risk Assessment  · Health Maintenance  · Care Team     ** See Completed Assessments for Annual Wellness Visit within the encounter summary.**    The following assessments were completed:  · Living Situation  · CAGE  · Depression Screening  · Timed Get Up and Go  · Whisper Test  · Cognitive Function Screening    · Nutrition Screening  · ADL Screening  · PAQ Screening    Vitals:    07/20/22 0908   BP: 130/70   BP Location: Left arm   Patient Position: Sitting   BP Method: Medium (Manual)   Pulse: 68   SpO2: 97%   Weight: 84.6 kg (186 lb 8.2 oz)   Height: 5' 10" (1.778 m)     Body mass index is 26.76 kg/m².  Physical Exam  Vitals reviewed.   Constitutional:       Appearance: Normal appearance.   Cardiovascular:      Rate and Rhythm: Normal rate and regular rhythm.      Pulses: Normal pulses.      Heart sounds: Normal heart sounds.   Pulmonary:      Effort: Pulmonary effort is normal.      Breath sounds: Normal breath sounds.   Skin:     General: Skin is warm and dry.   Neurological:      Mental Status: He is alert and oriented to person, place, and time.   Psychiatric:         Mood and Affect: Mood normal.         Behavior: Behavior normal.       Diagnoses and health risks identified today and associated recommendations/orders:    1. Encounter for preventive health examination  Stable- continue current treatment and follow up routinely with PCP     2. Claudication of both lower extremities  Stable- continue current treatment and follow up routinely with PCP and cardiology ()    3. Hyperlipidemia due to type 1 diabetes mellitus  Stable- continue current treatment and follow up routinely with PCP and cardiology  and endocrinology (Diane)    4. Hypertension " associated with diabetes  Stable- continue current treatment and follow up routinely with PCP and cardiology  and endocrinology (Diane)    5. PVD (peripheral vascular disease)  Stable- continue current treatment and follow up routinely with PCP and cardiology ()    6. Type 1 diabetes mellitus with diabetic polyneuropathy  Stable- continue current treatment and follow up routinely with PCP and endocrinology (Diane)    7. Type 1 diabetes mellitus with stage 3 chronic kidney disease, unspecified whether stage 3a or 3b CKD  Stable- continue current treatment and follow up routinely with PCP and endocrinology (Diane)  and nephrology ()    8. Type 1 diabetes mellitus with retinopathy and macular edema, unspecified laterality, unspecified retinopathy severity  Stable- continue current treatment and follow up routinely with PCP and endocrinology (Diane)    9. Insulin pump status  Stable- continue current treatment and follow up routinely with PCP and endocrinology (Diane)    10. Stage 3 chronic kidney disease, unspecified whether stage 3a or 3b CKD  Stable- continue current treatment and follow up routinely with PCP and nephrology ()    11. Subclinical hypothyroidism  Stable- continue current treatment and follow up routinely with PCP     Provided Narciso with a 5-10 year written screening schedule and personal prevention plan. Recommendations were developed using the USPSTF age appropriate recommendations. Education, counseling, and referrals were provided as needed. After Visit Summary printed and given to patient which includes a list of additional screenings\tests needed.    I offered to discuss advanced care planning, including how to pick a person who would make decisions for you if you were unable to make them for yourself, called a health care power of , and what kind of decisions you might make such as use of life sustaining treatments such as ventilators and  tube feeding when faced with a life limiting illness recorded on a living will that they will need to know. (How you want to be cared for as you near the end of your natural life)     X Patient is interested in learning more about how to make advanced directives.  I provided them paperwork and offered to discuss this with them.    Elsa Stephens, NP

## 2022-08-05 ENCOUNTER — LAB VISIT (OUTPATIENT)
Dept: LAB | Facility: HOSPITAL | Age: 67
End: 2022-08-05
Attending: NURSE PRACTITIONER
Payer: MEDICARE

## 2022-08-05 DIAGNOSIS — E10.42 TYPE 1 DIABETES MELLITUS WITH DIABETIC POLYNEUROPATHY: ICD-10-CM

## 2022-08-05 LAB
ALBUMIN SERPL BCP-MCNC: 4.1 G/DL (ref 3.5–5.2)
ALP SERPL-CCNC: 81 U/L (ref 55–135)
ALT SERPL W/O P-5'-P-CCNC: 22 U/L (ref 10–44)
ANION GAP SERPL CALC-SCNC: 7 MMOL/L (ref 8–16)
AST SERPL-CCNC: 22 U/L (ref 10–40)
BILIRUB SERPL-MCNC: 0.5 MG/DL (ref 0.1–1)
BUN SERPL-MCNC: 12 MG/DL (ref 8–23)
CALCIUM SERPL-MCNC: 9.8 MG/DL (ref 8.7–10.5)
CHLORIDE SERPL-SCNC: 102 MMOL/L (ref 95–110)
CO2 SERPL-SCNC: 26 MMOL/L (ref 23–29)
CREAT SERPL-MCNC: 1.2 MG/DL (ref 0.5–1.4)
EST. GFR  (NO RACE VARIABLE): >60 ML/MIN/1.73 M^2
ESTIMATED AVG GLUCOSE: 163 MG/DL (ref 68–131)
GLUCOSE SERPL-MCNC: 123 MG/DL (ref 70–110)
HBA1C MFR BLD: 7.3 % (ref 4–5.6)
POTASSIUM SERPL-SCNC: 4.6 MMOL/L (ref 3.5–5.1)
PROT SERPL-MCNC: 7.2 G/DL (ref 6–8.4)
SODIUM SERPL-SCNC: 135 MMOL/L (ref 136–145)

## 2022-08-05 PROCEDURE — 83036 HEMOGLOBIN GLYCOSYLATED A1C: CPT | Performed by: NURSE PRACTITIONER

## 2022-08-05 PROCEDURE — 80053 COMPREHEN METABOLIC PANEL: CPT | Performed by: NURSE PRACTITIONER

## 2022-08-05 PROCEDURE — 36415 COLL VENOUS BLD VENIPUNCTURE: CPT | Mod: PO | Performed by: NURSE PRACTITIONER

## 2022-08-07 ENCOUNTER — PATIENT MESSAGE (OUTPATIENT)
Dept: ENDOCRINOLOGY | Facility: CLINIC | Age: 67
End: 2022-08-07
Payer: MEDICARE

## 2022-08-07 ENCOUNTER — PATIENT MESSAGE (OUTPATIENT)
Dept: FAMILY MEDICINE | Facility: CLINIC | Age: 67
End: 2022-08-07
Payer: MEDICARE

## 2022-08-08 ENCOUNTER — TELEPHONE (OUTPATIENT)
Dept: FAMILY MEDICINE | Facility: CLINIC | Age: 67
End: 2022-08-08
Payer: MEDICARE

## 2022-08-08 NOTE — TELEPHONE ENCOUNTER
Death certification written . I have discussed his death . He  while asleep  , pronounced and had a complete autopsy at [facility].

## 2022-08-08 NOTE — TELEPHONE ENCOUNTER
----- Message from Iliana Lim sent at 2022 12:32 PM CDT -----  Contact: Catracho Roe  Type: Needs Medical Advice  Who Called:  Catracho Roe  Best Call Back Number:   Additional Information: Mookie is calling the office to get the death certificated signed. He adv it needs to be done before the cremation can take place and they are trying to have services on . The  contact info is [place]  GM: Donald Catherine [phone]

## 2022-08-08 NOTE — TELEPHONE ENCOUNTER
----- Message from She Aguila sent at 2022 10:46 AM CDT -----  Who Called: [facility]  Home    What is the reqeust in detail: Requesting call back to know if Dr. [facility] will be signing death certificate as patient will be arranged for cremation. Caller mentioned that the certificate is in [place]. Please advise.     Can the clinic reply by [facility]? N/A    Best Call Back Number: [phone]    Additional Information:
